# Patient Record
Sex: MALE | Race: WHITE | NOT HISPANIC OR LATINO | Employment: UNEMPLOYED | URBAN - METROPOLITAN AREA
[De-identification: names, ages, dates, MRNs, and addresses within clinical notes are randomized per-mention and may not be internally consistent; named-entity substitution may affect disease eponyms.]

---

## 2017-12-27 ENCOUNTER — APPOINTMENT (OUTPATIENT)
Dept: RADIOLOGY | Facility: MEDICAL CENTER | Age: 32
End: 2017-12-27
Attending: ORTHOPAEDIC SURGERY
Payer: MEDICAID

## 2018-01-15 ENCOUNTER — HOSPITAL ENCOUNTER (OUTPATIENT)
Dept: RADIOLOGY | Facility: MEDICAL CENTER | Age: 33
End: 2018-01-15
Attending: ORTHOPAEDIC SURGERY
Payer: MEDICAID

## 2018-01-15 DIAGNOSIS — S43.015A: ICD-10-CM

## 2018-01-15 PROCEDURE — 700101 HCHG RX REV CODE 250

## 2018-01-15 PROCEDURE — 77002 NEEDLE LOCALIZATION BY XRAY: CPT | Mod: LT

## 2018-01-15 PROCEDURE — 73222 MRI JOINT UPR EXTREM W/DYE: CPT | Mod: LT

## 2018-01-15 PROCEDURE — 700117 HCHG RX CONTRAST REV CODE 255: Performed by: ORTHOPAEDIC SURGERY

## 2018-01-15 PROCEDURE — A9579 GAD-BASE MR CONTRAST NOS,1ML: HCPCS | Performed by: ORTHOPAEDIC SURGERY

## 2018-01-15 RX ORDER — LIDOCAINE HYDROCHLORIDE 10 MG/ML
INJECTION, SOLUTION INFILTRATION; PERINEURAL
Status: DISPENSED
Start: 2018-01-15 | End: 2018-01-16

## 2018-01-15 RX ADMIN — GADODIAMIDE 1 ML: 287 INJECTION INTRAVENOUS at 13:55

## 2018-01-15 RX ADMIN — IOHEXOL 5 ML: 300 INJECTION, SOLUTION INTRAVENOUS at 13:55

## 2018-07-18 PROCEDURE — 99283 EMERGENCY DEPT VISIT LOW MDM: CPT

## 2018-07-18 RX ORDER — FLUOXETINE HYDROCHLORIDE 20 MG/1
30 CAPSULE ORAL DAILY
Status: SHIPPED | COMMUNITY
End: 2020-08-03

## 2018-07-18 ASSESSMENT — PAIN SCALES - GENERAL: PAINLEVEL_OUTOF10: 2

## 2018-07-19 ENCOUNTER — HOSPITAL ENCOUNTER (EMERGENCY)
Facility: MEDICAL CENTER | Age: 33
End: 2018-07-19
Attending: EMERGENCY MEDICINE
Payer: MEDICAID

## 2018-07-19 VITALS
WEIGHT: 191.8 LBS | TEMPERATURE: 97.9 F | BODY MASS INDEX: 27.46 KG/M2 | HEART RATE: 62 BPM | SYSTOLIC BLOOD PRESSURE: 116 MMHG | OXYGEN SATURATION: 97 % | RESPIRATION RATE: 18 BRPM | HEIGHT: 70 IN | DIASTOLIC BLOOD PRESSURE: 78 MMHG

## 2018-07-19 DIAGNOSIS — K05.10 GINGIVITIS: ICD-10-CM

## 2018-07-19 RX ORDER — CLINDAMYCIN HYDROCHLORIDE 150 MG/1
300 CAPSULE ORAL 4 TIMES DAILY
Qty: 56 CAP | Refills: 0 | Status: SHIPPED | OUTPATIENT
Start: 2018-07-20 | End: 2018-07-27

## 2018-07-19 NOTE — ED NOTES
Pt given written and oral discharge instructions. Pt verbalized understanding of all instructions given. All questions answered. Pt given paper prescription and educated on use and side effects. Pt given f/u instructions with verbalization of understanding. Pt educated on s/s of when to return to the ER. Pt ambulating independently upon time of discharge in good condition.

## 2018-07-19 NOTE — ED PROVIDER NOTES
"ED Provider Note    CHIEF COMPLAINT  Chief Complaint   Patient presents with   • Cyst       HPI  Jarvis Perez is a 33 y.o. male who presents with 4 days of intermittent swelling to 1 of his teeth.  He states it was larger today, but it seems to have gone down.  He has not noticed any drainage.  No fevers or vomiting.  He is going on a trip soon and does not want it to get worse while he is away.    REVIEW OF SYSTEMS  See HPI for further details.  No fevers or vomiting    PAST MEDICAL HISTORY  Past Medical History:   Diagnosis Date   • Psychiatric disorder        FAMILY HISTORY  History reviewed. No pertinent family history.    SOCIAL HISTORY  Social History     Social History   • Marital status: Single     Spouse name: N/A   • Number of children: N/A   • Years of education: N/A     Social History Main Topics   • Smoking status: Current Every Day Smoker   • Smokeless tobacco: Never Used   • Alcohol use Yes      Comment: Occasionally   • Drug use: No   • Sexual activity: Not on file     Other Topics Concern   • Not on file     Social History Narrative   • No narrative on file       SURGICAL HISTORY  No past surgical history on file.    CURRENT MEDICATIONS  Home Medications     Reviewed by Osman Olea R.N. (Registered Nurse) on 07/18/18 at 2259  Med List Status: Partial   Medication Last Dose Status   DEPAKOTE 500 MG PO TBEC Not taking Active   ERYTHROMYCIN BASE 500 MG PO TABS Not taking Active   FLUoxetine (PROZAC) 20 MG Cap 7/18/2018 Active   KLONOPIN 0.5 MG PO TABS Not taking Active                ALLERGIES  Allergies   Allergen Reactions   • Other Food      Pecans      • Penicillins Hives       PHYSICAL EXAM  VITAL SIGNS: /78   Pulse 62   Temp 36.6 °C (97.9 °F)   Resp 18   Ht 1.778 m (5' 10\") Comment: Stated  Wt 87 kg (191 lb 12.8 oz)   SpO2 97%   BMI 27.52 kg/m²  @[id number]::@   Constitutional: Well developed, Well nourished, No acute distress, Non-toxic appearance.   HENT: Normocephalic, " Atraumatic, Bilateral external ears normal, mild swelling and erythema above tooth #8 with no discharge or loosening.  Dentition is generally in good condition.  Buccal mucosa is normal.  Submandibular area soft and supple.  Eyes: PERRLA, EOMI, Conjunctiva normal, No discharge.   Neck: Normal range of motion, No tenderness, Supple, No stridor.   Lymphatic: No lymphadenopathy noted.   Skin: Warm, Dry, No erythema, No rash.   Musculoskeletal: Good range of motion in all major joints..       COURSE & MEDICAL DECISION MAKING  Pertinent Labs & Imaging studies reviewed. (See chart for details)  . Patient is given a prescription for clindamycin as well as a list for finding a dentist. They understand that we cannot fix their teeth and they need to find a dentist in order to fix the problem. There is no abscess evident. The patient looks well. They're given verbal and written instructions on how to care for dental pain. They're discharged in satisfactory condition.    FINAL IMPRESSION  1. Gingivitis                 Electronically signed by: Cammie Escobar, 7/19/2018 12:35 AM

## 2018-07-19 NOTE — DISCHARGE INSTRUCTIONS
Gingivitis  Introduction  Gingivitis is redness, soreness, and swelling (inflammation) of the gums. It is caused by germs (plaque) that build up on the teeth and gums. This happens because of poor care and cleaning of the mouth and teeth (oral hygiene). This condition is usually mild and clears up with treatment and proper cleaning of the teeth.  Follow these instructions at home:  · Follow instructions from your dentist about how to clean your teeth. Make sure you:  ¨ Brush your teeth two times per day with a soft-bristled toothbrush.  ¨ Floss at least one time per day. Do this before you brush your teeth.  ¨ Use a mouthwash as told by your dentist.  · Eat a well-balanced diet. Between meals, limit your intake of foods and drinks that have sugar in them.  · See a dentist on a regular basis for cleaning and checkups.  · Do not use tobacco products. These include cigarettes, chewing tobacco, or e-cigarettes. If you need help quitting, ask your doctor.  · Keep all follow-up visits as told by your dentist. This is important.  Contact a doctor if:  · You have a fever.  · You have a lot of bleeding from your gums.  · You have pain in your gums or teeth.  · You have trouble chewing.  · You have any loose or infected teeth.  · You have swollen glands in your face or neck.  This information is not intended to replace advice given to you by your health care provider. Make sure you discuss any questions you have with your health care provider.  Document Released: 01/20/2012 Document Revised: 05/25/2017 Document Reviewed: 08/01/2016  © 2017 [employer]vier

## 2020-08-03 ENCOUNTER — HOSPITAL ENCOUNTER (EMERGENCY)
Facility: MEDICAL CENTER | Age: 35
End: 2020-08-03
Attending: EMERGENCY MEDICINE
Payer: MEDICAID

## 2020-08-03 VITALS
OXYGEN SATURATION: 100 % | BODY MASS INDEX: 28.09 KG/M2 | SYSTOLIC BLOOD PRESSURE: 121 MMHG | HEART RATE: 77 BPM | RESPIRATION RATE: 16 BRPM | WEIGHT: 195.77 LBS | TEMPERATURE: 98.2 F | DIASTOLIC BLOOD PRESSURE: 76 MMHG

## 2020-08-03 DIAGNOSIS — R06.02 SHORTNESS OF BREATH: ICD-10-CM

## 2020-08-03 DIAGNOSIS — J02.9 SORE THROAT: ICD-10-CM

## 2020-08-03 DIAGNOSIS — R52 BODY ACHES: ICD-10-CM

## 2020-08-03 LAB
COVID ORDER STATUS COVID19: NORMAL
SARS-COV-2 RNA RESP QL NAA+PROBE: NOTDETECTED
SPECIMEN SOURCE: NORMAL

## 2020-08-03 PROCEDURE — C9803 HOPD COVID-19 SPEC COLLECT: HCPCS | Performed by: EMERGENCY MEDICINE

## 2020-08-03 PROCEDURE — U0003 INFECTIOUS AGENT DETECTION BY NUCLEIC ACID (DNA OR RNA); SEVERE ACUTE RESPIRATORY SYNDROME CORONAVIRUS 2 (SARS-COV-2) (CORONAVIRUS DISEASE [COVID-19]), AMPLIFIED PROBE TECHNIQUE, MAKING USE OF HIGH THROUGHPUT TECHNOLOGIES AS DESCRIBED BY CMS-2020-01-R: HCPCS

## 2020-08-03 PROCEDURE — 99283 EMERGENCY DEPT VISIT LOW MDM: CPT

## 2020-08-03 RX ORDER — ESCITALOPRAM OXALATE 10 MG/1
10 TABLET ORAL DAILY
Status: SHIPPED | COMMUNITY
End: 2022-10-25

## 2020-08-03 NOTE — ED PROVIDER NOTES
CHIEF COMPLAINT  Chief Complaint   Patient presents with   • Shortness of Breath     symptoms worsening since friday, SoB with exertion.   • Sore Throat   • Tired       HPI  Jarvis Perez is a 35 y.o. male who presents this morning with a chief complaint of sore throat, fatigue, body aches, subjective fever and chills and shortness of breath.  Patient works for a [employer].  He has called off to work since Friday when his symptoms began and he called the nurse hotline and they suggested that he come in to be tested for COVID.  Patient states his workplace would like him tested before he goes back.    REVIEW OF SYSTEMS  See HPI for further details. All other system reviews are negative.    PAST MEDICAL HISTORY  Past Medical History:   Diagnosis Date   • Psychiatric disorder        FAMILY HISTORY  History reviewed. No pertinent family history.    SOCIAL HISTORY  Social History     Socioeconomic History   • Marital status: Single     Spouse name: Not on file   • Number of children: Not on file   • Years of education: Not on file   • Highest education level: Not on file   Occupational History   • Not on file   Social Needs   • Financial resource strain: Not on file   • Food insecurity     Worry: Not on file     Inability: Not on file   • Transportation needs     Medical: Not on file     Non-medical: Not on file   Tobacco Use   • Smoking status: Current Every Day Smoker     Packs/day: 1.00     Types: Cigarettes   • Smokeless tobacco: Never Used   Substance and Sexual Activity   • Alcohol use: Not Currently   • Drug use: No   • Sexual activity: Not on file   Lifestyle   • Physical activity     Days per week: Not on file     Minutes per session: Not on file   • Stress: Not on file   Relationships   • Social connections     Talks on phone: Not on file     Gets together: Not on file     Attends [employer] service: Not on file     Active member of club or organization: Not on file     Attends meetings of clubs or  organizations: Not on file     Relationship status: Not on file   • Intimate partner violence     Fear of current or ex partner: Not on file     Emotionally abused: Not on file     Physically abused: Not on file     Forced sexual activity: Not on file   Other Topics Concern   • Not on file   Social History Narrative   • Not on file       SURGICAL HISTORY  History reviewed. No pertinent surgical history.    CURRENT MEDICATIONS  none    ALLERGIES  Allergies   Allergen Reactions   • Other Food      Pecans      • Penicillins Hives       PHYSICAL EXAM  VITAL SIGNS: /76   Pulse 77   Temp 36.8 °C (98.2 °F) (Oral)   Resp 16   Wt 88.8 kg (195 lb 12.3 oz)   SpO2 100%   BMI 28.09 kg/m²     Constitutional: Patient is well developed, well nourished in no acute distress.   HENT: Normocephalic. Oropharynx moist without erythema or exudates, nose normal with no mucosal edema or drainage.   Eyes: PERRL, EOMI, Conjunctiva without erythema or exudates.   Neck: Supple with  Normal range of motion in flexion, extension and lateral rotation. No tenderness.  Lymphatic: No lymphadenopathy noted.   Cardiovascular: Normal heart rate and rhythm. No murmur  Thorax & Lungs: Clear and equal breath sounds with good excursion. No respiratory distress, no rhonchi or wheezing.  Abdomen: Bowel sounds normal in all four quadrants. Soft,nontender.   Skin: Warm, Dry, No rashes.  Extremities: Peripheral pulses 4/4  No tenderness  Musculoskeletal: Normal range of motion in all major joints.  Neurologic: Alert & oriented x 3, Normal motor function, Normal sensory function  Psychiatric: Affect normal, Judgment normal, Mood normal.    COURSE & MEDICAL DECISION MAKING  Pertinent Labs & Imaging studies reviewed. (See chart for details)  Patient was tested for COVID.  He will await the results from the [facility] and quarantine appropriately for 2 weeks if positive and if not he will go back to work without restrictions.  He was  given a work note for 2 days.    FINAL IMPRESSION  1.  Sore throat  2.  Body aches  3.  Shortness of breath  4.  Possible COVID versus viral illness         Electronically signed by: Karolina Raza D.O., 8/3/2020 5:59 [facility] Provider Note

## 2020-08-03 NOTE — Clinical Note
Jarvis Perez was seen and treated in our emergency department on 8/3/2020.  He may return to work on 08/05/2020.  Jarvis has been tested for Covid and will need to await the results from the health department.  If this test comes back positive he will need to quarantine for 2 weeks.  If it comes back negative he can return to work without restrictions.     If you have any questions or concerns, please don't hesitate to call.      Karolina Raza D.O.

## 2020-08-03 NOTE — ED TRIAGE NOTES
Chief Complaint   Patient presents with   • Shortness of Breath     symptoms worsening since friday, SoB with exertion.   • Sore Throat   • Tired     ED Triage Vitals [08/03/20 0538]   Enc Vitals Group      Blood Pressure 121/76      Pulse 77      Respiration 16      Temperature 36.8 °C (98.2 °F)      Temp src Oral      Pulse Oximetry 100 %      Weight 88.8 kg (195 lb 12.3 oz)

## 2020-08-03 NOTE — DISCHARGE INSTRUCTIONS
You have been tested for COVID, the results will be called to you by the [facility].  If it is positive you will need to quarantine for 2 weeks and then retest until it is negative before you can return to work.  If it is negative consider yourself to just have a viral illness, rest, increase fluids, immune boosters i.e. airborne, vitamin C etc.  Tylenol or ibuprofen for body aches or temperature and follow-up with your primary care doctor within the week for recheck.

## 2020-08-05 NOTE — ED NOTES
COVID-19 Test Follow Up  08/05/20      Results for BRIAN SAWANT (MRN [id number]) as of 8/5/2020 10:54   Ref. Range 8/3/2020 05:55   SARS-CoV-2 by PCR Unknown NotDetected       Patient tested negative for COVID-19. I have informed the patient of the result by My Chart. Encouraged to stay at home until no fever for 72 hours without the use of fever reducing medications and symptoms improving. Informed there is no need to further self-isolate for 14 days for COVID-19 unless otherwise directed by the Health Dept.     Linda Duque, PharmD

## 2020-11-08 ENCOUNTER — HOSPITAL ENCOUNTER (EMERGENCY)
Facility: MEDICAL CENTER | Age: 35
End: 2020-11-08
Attending: EMERGENCY MEDICINE
Payer: MEDICAID

## 2020-11-08 VITALS
RESPIRATION RATE: 16 BRPM | SYSTOLIC BLOOD PRESSURE: 115 MMHG | WEIGHT: 177.69 LBS | TEMPERATURE: 99.5 F | HEART RATE: 70 BPM | DIASTOLIC BLOOD PRESSURE: 69 MMHG | BODY MASS INDEX: 26.32 KG/M2 | OXYGEN SATURATION: 97 % | HEIGHT: 69 IN

## 2020-11-08 DIAGNOSIS — Z20.822 SUSPECTED COVID-19 VIRUS INFECTION: ICD-10-CM

## 2020-11-08 LAB — COVID ORDER STATUS COVID19: NORMAL

## 2020-11-08 PROCEDURE — C9803 HOPD COVID-19 SPEC COLLECT: HCPCS | Performed by: EMERGENCY MEDICINE

## 2020-11-08 PROCEDURE — 99284 EMERGENCY DEPT VISIT MOD MDM: CPT

## 2020-11-08 PROCEDURE — U0003 INFECTIOUS AGENT DETECTION BY NUCLEIC ACID (DNA OR RNA); SEVERE ACUTE RESPIRATORY SYNDROME CORONAVIRUS 2 (SARS-COV-2) (CORONAVIRUS DISEASE [COVID-19]), AMPLIFIED PROBE TECHNIQUE, MAKING USE OF HIGH THROUGHPUT TECHNOLOGIES AS DESCRIBED BY CMS-2020-01-R: HCPCS

## 2020-11-09 LAB
SARS-COV-2 RNA RESP QL NAA+PROBE: DETECTED
SPECIMEN SOURCE: ABNORMAL

## 2020-11-09 NOTE — ED NOTES
COVID-19 Test Follow-Up  11/09/20    Patient is positive for COVID-19.      SARS-CoV-2 Source   Date Value Ref Range Status   11/08/2020 NP Swab  Final     SARS-CoV-2 by PCR   Date Value Ref Range Status   11/08/2020 DETECTED (AA)  Final     Comment:     **The TaqPath COVID-19 SARS-CoV-2 test has been made available for use under  the Emergency Use Authorization (EUA) only.       Attempted to contact the patient by phone but there was no answer. I have informed the patient of the positive result by My Chart message and that the Health Dept would be in contact soon. Instructed them to continue to quarantine and self-isolate according with the CDC guidance or as otherwise directed by the Health Dept.    Per the CDC, they should continue to self-isolate until:   • At least 10 days since symptoms first appeared and  • At least 24 hours with no fever without fever-reducing medication and  • Other symptoms of COVID-19 are improving (Loss of taste and smell may persist for weeks or months after recovery and need not delay the end of isolation)    They are advised to return to the ER for worsening symptoms including difficulty breathing, ongoing fever, weakness or chest pain.    Belia Dunn, PharmD     ADDENDUM: 11/11/2020  Spoke with the patient, answered questions and gave him the above information.    Belia Dunn, PharmD

## 2020-11-09 NOTE — ED NOTES
"Pt states \" I am concerned I have Covid \" Pt unsure of exposure. Pt states he had labored breathing at home, upon assessment pt resting in bed watching TV, non labored breathing noted   "

## 2020-11-09 NOTE — ED PROVIDER NOTES
"ED Provider Note    CHIEF COMPLAINT  Chief Complaint   Patient presents with   • Fever     Onset friday No documented [employer] Denies known COVID exposure   • Cough   • Headache   • Shortness of Breath       HPI  Jarivs Perez is a 35 y.o. male who presents with cough, headache, shortness of breath, decreased sensation in taste and smell, sore throat, body aches.  He states that he was around another person on Tuesday with similar symptoms.  The patient notes that his symptoms have been present for only 2 days now.  His roommate also has similar symptoms.  No chest pain.  No swelling.  No rash.  No vomiting or diarrhea.  No abdominal pain.  Denies prior pulmonary history though does smoke cigarettes.    I verified that the patient was wearing a mask and I was wearing appropriate PPE every time I entered the room. The patient's mask was on the patient at all times during my encounter except for a brief view of the oropharynx.      REVIEW OF SYSTEMS  See HPI for further details. All other systems are negative.     PAST MEDICAL HISTORY   has a past medical history of Psychiatric disorder.    SOCIAL HISTORY  Social History     Tobacco Use   • Smoking status: Current Every Day Smoker     Packs/day: 1.00     Types: Cigarettes   • Smokeless tobacco: Never Used   Substance and Sexual Activity   • Alcohol use: Not Currently   • Drug use: No   • Sexual activity: Not on file       SURGICAL HISTORY  patient denies any surgical history    CURRENT MEDICATIONS  Home Medications    **Home medications have not yet been reviewed for this encounter**         ALLERGIES  Allergies   Allergen Reactions   • Other Food      Pecans      • Penicillins Hives       PHYSICAL EXAM  VITAL SIGNS: /72   Pulse 93   Temp 36.8 °C (98.3 °F) (Temporal)   Resp 16   Ht 1.753 m (5' 9\")   Wt 80.6 kg (177 lb 11.1 oz)   SpO2 96%   BMI 26.24 kg/m²   Pulse ox interpretation: I interpret this pulse ox as normal.  Constitutional: Alert in no " apparent distress.  HENT: No signs of trauma, Bilateral external ears normal, Nose normal.  Posterior pharynx is unremarkable.  Eyes: Pupils are equal and reactive, Conjunctiva normal, Non-icteric.   Neck: Normal range of motion, No tenderness, Supple, No stridor.   Cardiovascular: Regular rate and rhythm.   Thorax & Lungs: Normal breath sounds, No respiratory distress, No wheezing, No chest tenderness.   Abdomen: Bowel sounds normal, Soft, No tenderness, No masses, No pulsatile masses. No peritoneal signs.  Skin: Warm, Dry, No erythema, No rash.   Back: No bony tenderness, No CVA tenderness.   Extremities: Intact distal pulses, No edema, No tenderness, No cyanosis  Neurologic: Alert, No focal deficits noted.       DIAGNOSTIC STUDIES / PROCEDURES      LABS  Labs Reviewed   COVID/SARS COV-2    Narrative:     Droplet, Contact, and Eye [id number] PRADIP PIÑA.  Is patient being admitted?->No  Expected turn around time?->Routine (In-House PCR up to 24  hours)  Is this the patients First SARS CoV-2 test?->No  Is this patient employed in healthcare?->No  Is the patient symptomatic as defined by the CDC?->Yes  Date of symptom onset?->11/6/20  Is the patient hospitalized?->No  Is the patient a resident in a congregate care setting?->No  Is the patient pregnant?->No   SARS-COV-2, PCR (IN-HOUSE)    Narrative:     Droplet, Contact, and Eye [id number] PRADIP PIÑA.  Is patient being admitted?->No  Expected turn around time?->Routine (In-House PCR up to 24  hours)  Is this the patients First SARS CoV-2 test?->No  Is this patient employed in healthcare?->No  Is the patient symptomatic as defined by the CDC?->Yes  Date of symptom onset?->11/6/20  Is the patient hospitalized?->No  Is the patient a resident in a congregate care setting?->No  Is the patient pregnant?->No         COURSE & MEDICAL DECISION MAKING    Medications - No data to display     Pertinent Labs & Imaging studies reviewed. (See chart for  "details)  35 y.o. male with symptoms suggestive of Covid.  Has multiple sick contacts with similar symptoms though no confirmed Covid diagnosis.  Patient is requesting Covid testing.  He tried to call around to get an outpatient test however he could not find any available testing facility.  Covid testing was performed here.  No respiratory distress.  Clear breath sounds bilaterally.  Normal vital signs.  No hypoxia.  Patient appears stable for outpatient follow-up.    The patient was instructed to follow-up with primary care physician for further management.  To return immediately for any worsening symptoms or development of any other concerning signs or symptoms. The patient verbalizes understanding in their own words.    /69   Pulse 70   Temp 37.5 °C (99.5 °F) (Temporal)   Resp 16   Ht 1.753 m (5' 9\")   Wt 80.6 kg (177 lb 11.1 oz)   SpO2 97%   BMI 26.24 kg/m²     The patient was referred to primary care where they will receive further BP management.      [facility], Emergency Dept  [address]  [facility] [id number] [phone]    As needed, If symptoms worsen    Primary care doctor    Schedule an appointment as soon as possible for a visit         FINAL IMPRESSION  1. Suspected COVID-19 virus infection            Electronically signed by: Jayden Ruffin M.D., 11/8/2020 6:14 PM    "

## 2021-05-26 ENCOUNTER — APPOINTMENT (OUTPATIENT)
Dept: RADIOLOGY | Facility: MEDICAL CENTER | Age: 36
End: 2021-05-26
Attending: EMERGENCY MEDICINE
Payer: MEDICAID

## 2021-05-26 ENCOUNTER — HOSPITAL ENCOUNTER (EMERGENCY)
Facility: MEDICAL CENTER | Age: 36
End: 2021-05-26
Attending: EMERGENCY MEDICINE
Payer: MEDICAID

## 2021-05-26 ENCOUNTER — PATIENT OUTREACH (OUTPATIENT)
Dept: HEALTH INFORMATION MANAGEMENT | Facility: OTHER | Age: 36
End: 2021-05-26

## 2021-05-26 VITALS
SYSTOLIC BLOOD PRESSURE: 103 MMHG | TEMPERATURE: 97.2 F | HEART RATE: 58 BPM | RESPIRATION RATE: 18 BRPM | BODY MASS INDEX: 28.11 KG/M2 | DIASTOLIC BLOOD PRESSURE: 76 MMHG | HEIGHT: 69 IN | OXYGEN SATURATION: 96 % | WEIGHT: 189.82 LBS

## 2021-05-26 DIAGNOSIS — R07.9 CHEST PAIN, UNSPECIFIED TYPE: ICD-10-CM

## 2021-05-26 LAB
ALBUMIN SERPL BCP-MCNC: 4 G/DL (ref 3.2–4.9)
ALBUMIN/GLOB SERPL: 2 G/DL
ALP SERPL-CCNC: 80 U/L (ref 30–99)
ALT SERPL-CCNC: 19 U/L (ref 2–50)
ANION GAP SERPL CALC-SCNC: 11 MMOL/L (ref 7–16)
AST SERPL-CCNC: 19 U/L (ref 12–45)
BASOPHILS # BLD AUTO: 0.5 % (ref 0–1.8)
BASOPHILS # BLD: 0.05 K/UL (ref 0–0.12)
BILIRUB SERPL-MCNC: 0.5 MG/DL (ref 0.1–1.5)
BUN SERPL-MCNC: 17 MG/DL (ref 8–22)
CALCIUM SERPL-MCNC: 8.8 MG/DL (ref 8.4–10.2)
CHLORIDE SERPL-SCNC: 107 MMOL/L (ref 96–112)
CO2 SERPL-SCNC: 23 MMOL/L (ref 20–33)
CREAT SERPL-MCNC: 0.85 MG/DL (ref 0.5–1.4)
EKG IMPRESSION: NORMAL
EOSINOPHIL # BLD AUTO: 0.13 K/UL (ref 0–0.51)
EOSINOPHIL NFR BLD: 1.2 % (ref 0–6.9)
ERYTHROCYTE [DISTWIDTH] IN BLOOD BY AUTOMATED COUNT: 41.9 FL (ref 35.9–50)
GLOBULIN SER CALC-MCNC: 2 G/DL (ref 1.9–3.5)
GLUCOSE SERPL-MCNC: 92 MG/DL (ref 65–99)
HCT VFR BLD AUTO: 46.6 % (ref 42–52)
HGB BLD-MCNC: 15.6 G/DL (ref 14–18)
IMM GRANULOCYTES # BLD AUTO: 0.06 K/UL (ref 0–0.11)
IMM GRANULOCYTES NFR BLD AUTO: 0.6 % (ref 0–0.9)
LIPASE SERPL-CCNC: 35 U/L (ref 7–58)
LYMPHOCYTES # BLD AUTO: 2.28 K/UL (ref 1–4.8)
LYMPHOCYTES NFR BLD: 21.9 % (ref 22–41)
MCH RBC QN AUTO: 30.3 PG (ref 27–33)
MCHC RBC AUTO-ENTMCNC: 33.5 G/DL (ref 33.7–35.3)
MCV RBC AUTO: 90.5 FL (ref 81.4–97.8)
MONOCYTES # BLD AUTO: 0.57 K/UL (ref 0–0.85)
MONOCYTES NFR BLD AUTO: 5.5 % (ref 0–13.4)
NEUTROPHILS # BLD AUTO: 7.34 K/UL (ref 1.82–7.42)
NEUTROPHILS NFR BLD: 70.3 % (ref 44–72)
NRBC # BLD AUTO: 0 K/UL
NRBC BLD-RTO: 0 /100 WBC
NT-PROBNP SERPL IA-MCNC: 30 PG/ML (ref 0–125)
PLATELET # BLD AUTO: 178 K/UL (ref 164–446)
PMV BLD AUTO: 11.1 FL (ref 9–12.9)
POTASSIUM SERPL-SCNC: 4.3 MMOL/L (ref 3.6–5.5)
PROT SERPL-MCNC: 6 G/DL (ref 6–8.2)
RBC # BLD AUTO: 5.15 M/UL (ref 4.7–6.1)
SODIUM SERPL-SCNC: 141 MMOL/L (ref 135–145)
TROPONIN T SERPL-MCNC: <6 NG/L (ref 6–19)
WBC # BLD AUTO: 10.4 K/UL (ref 4.8–10.8)

## 2021-05-26 PROCEDURE — 80053 COMPREHEN METABOLIC PANEL: CPT

## 2021-05-26 PROCEDURE — 93005 ELECTROCARDIOGRAM TRACING: CPT | Performed by: EMERGENCY MEDICINE

## 2021-05-26 PROCEDURE — 84484 ASSAY OF TROPONIN QUANT: CPT

## 2021-05-26 PROCEDURE — 83690 ASSAY OF LIPASE: CPT

## 2021-05-26 PROCEDURE — 71045 X-RAY EXAM CHEST 1 VIEW: CPT

## 2021-05-26 PROCEDURE — 36415 COLL VENOUS BLD VENIPUNCTURE: CPT

## 2021-05-26 PROCEDURE — 85025 COMPLETE CBC W/AUTO DIFF WBC: CPT

## 2021-05-26 PROCEDURE — 99283 EMERGENCY DEPT VISIT LOW MDM: CPT

## 2021-05-26 PROCEDURE — 83880 ASSAY OF NATRIURETIC PEPTIDE: CPT

## 2021-05-26 PROCEDURE — 93005 ELECTROCARDIOGRAM TRACING: CPT

## 2021-05-26 NOTE — ED TRIAGE NOTES
"Chief Complaint   Patient presents with   • Chest Pain     woke up w/ \"sharp\" L side chest pain, denies any radiation of pain, pain is intermittant now.    • Shortness of Breath     w/ CP, mild     /74   Pulse 71   Temp 36.2 °C (97.2 °F) (Temporal)   Resp 18   Ht 1.753 m (5' 9\")   SpO2 98%   BMI 26.24 kg/m²     Pt BIB family for above concern, EKG done in triage  "

## 2021-05-26 NOTE — LETTER
Jarvis Perez was seen and treated in our emergency department on 5/26/2021.  He may return to work on 05/28/2021      If you have any questions or concerns, please don't hesitate to call.      Dr Lex Worthington

## 2021-05-27 NOTE — ED NOTES
Discharge home with instructions and follow up care reviewed and given to patient with verbal understanding    Ambulatory with a steady gait and stable condition

## 2021-05-27 NOTE — PROGRESS NOTES
CHW called patient but there was no answer. CHW called patient's nurse to see if nurse was able to connect CHW to patient. Patient was just discharged as CHW called nurse. CHW will call patient tomorrow.

## 2021-05-27 NOTE — ED PROVIDER NOTES
"ED Provider Note    ED Provider Note    Primary care provider: Carlos Enrique Reyes M.D. (Inactive)  Means of arrival: Walk-in  History obtained from: Patient    CHIEF COMPLAINT  Chief Complaint   Patient presents with   • Chest Pain     woke up w/ \"sharp\" L side chest pain, denies any radiation of pain, pain is intermittant now.    • Shortness of Breath     w/ CP, mild     Seen at 5:08 PM.   HPI  Jarvis Perez is a 36 y.o. male who presents to the Emergency Department with intermittent sharp stabbing left-sided chest pain, initially worse with deep inspiration.  The pain was moderate to severe when the patient woke up from sleep at 3 PM today.  Since this time it has eased off and he notes some intermittent brief twinges in the left side of his chest.  He did have some associated shortness of breath, none currently.  He is not having lightheadedness, nausea or vomiting.  He reports having a few months of intermittent stabbing left-sided chest pain, sometimes when he wakes up he feels that the blood rushes from his heart and he has to take a few deep breaths.  He does sleep with multiple pillows but it does not sound as if he has to prop himself up from sleep, does not appear consistent with orthopnea.    He denies any fevers or chills.  He does have a chronic cough from smoking.  He denies any numbness or focal weakness.  He has on an SSRI but no other medications.  No prior history of DVT or pulmonary embolus.    REVIEW OF SYSTEMS  See HPI,   Remainder of ROS negative.     PAST MEDICAL HISTORY   has a past medical history of Psychiatric disorder.    SURGICAL HISTORY  patient denies any surgical history    SOCIAL HISTORY  Social History     Tobacco Use   • Smoking status: Current Every Day Smoker     Packs/day: 1.00     Types: Cigarettes   • Smokeless tobacco: Never Used   Vaping Use   • Vaping Use: Former   • Substances: Nicotine   Substance Use Topics   • Alcohol use: Not Currently   • Drug use: No      Social " "History     Substance and Sexual Activity   Drug Use No       FAMILY HISTORY  History reviewed. No pertinent family history.    CURRENT MEDICATIONS  Reviewed.  See Encounter Summary.     ALLERGIES  Allergies   Allergen Reactions   • Other Food      Pecans      • Penicillins Hives       PHYSICAL EXAM  VITAL SIGNS: /76   Pulse (!) 58   Temp 36.2 °C (97.2 °F) (Temporal)   Resp 18   Ht 1.753 m (5' 9\")   Wt 86.1 kg (189 lb 13.1 oz)   SpO2 96%   BMI 28.03 kg/m²   Constitutional: Awake, alert in no apparent distress.  HENT: Normocephalic, Bilateral external ears normal. Nose normal.   Eyes: Conjunctiva normal, non-icteric, EOMI.    Thorax & Lungs: Easy unlabored respirations, Clear to ascultation bilaterally.  Chest wall normal in appearance.  Cardiovascular: Regular rate, Regular rhythm, No murmurs, rubs or gallops. Bilateral pulses symmetrical.   Abdomen:  Soft, nontender, nondistended, normal active bowel sounds.   :    Skin: Visualized skin is  Dry, No erythema, No rash.   Musculoskeletal:   No cyanosis, clubbing or edema. No leg asymmetry.   Neurologic: Alert, Grossly non-focal.   Psychiatric: Normal affect, Normal mood  Lymphatic:  No cervical LAD    EKG   12 lead Interpreted by me  Rhythm:  Normal sinus rhythm   Rate: 62  Axis: normal  Ectopy: none  Conduction: normal  ST Segments: no acute change  T Waves: no acute change  Clinical Impression: Normal EKG without acute changes     RADIOLOGY  DX-CHEST-PORTABLE (1 VIEW)   Final Result      No acute cardiac or pulmonary abnormality is noted.            COURSE & MEDICAL DECISION MAKING  Pertinent Labs & Imaging studies reviewed. (See chart for details)    Differential diagnoses include but are not limited to: Atypical chest pain, anxiety, less likely PND, ACS, Prinzmetal's angina, patient is PERC negative for pulmonary embolus.  Not consistent with dissection.  Unlikely to be CAP, pneumothorax.    5:08 PM - Medical record reviewed, patient seen and " examined at bedside.    Decision Making:  This is a pleasant 36 y.o. year old male who presents with chest pain.  The patient has had waxing waning chest pain for the past several months, seems quite atypical in nature.  He does not have any exertional pain or dyspnea on exertion.  Heart score is quite low at this time.  The patient's only risk factor is smoking history.  He does have some underlying depression, possibly untreated anxiety as well.  Other causes were explored.  The patient is PERC negative for pulmonary embolus.  Do not suspect dissection.  Chest x-ray negative for pneumothorax or infiltrate.  He is chest pain-free at this time.  Plan to discharge patient and arrange outpatient cardiology follow-up.    Discharge Medications:  Discharge Medication List as of 5/26/2021  6:26 PM          The patient was discharged home (see d/c instructions) was told to return immediately for any signs or symptoms listed, or any worsening at all.  The patient verbally agreed to the discharge precautions and follow-up plan which is documented in EPIC.        FINAL IMPRESSION  1. Chest pain, unspecified type

## 2021-05-28 NOTE — PROGRESS NOTES
CHW called patient a second time to schedule patient for cards appointment. Patient did not answer CHW's call. CHW left a VM for patient with CHW's contact information and cardiology information. CHW will call patient a third time to schedule PCP appointment.

## 2021-06-03 NOTE — PROGRESS NOTES
CHW called patient a third time to schedule PCP appointment. Patient did not answer CHW's call. CHW left a VM for patient with CHW's contact information and clinic information. CHW will discharge patient from list due to inability to contact.

## 2022-08-03 ENCOUNTER — OFFICE VISIT (OUTPATIENT)
Dept: INTERNAL MEDICINE | Facility: OTHER | Age: 37
End: 2022-08-03
Payer: MEDICAID

## 2022-08-03 VITALS
TEMPERATURE: 98.4 F | HEART RATE: 65 BPM | HEIGHT: 69 IN | OXYGEN SATURATION: 96 % | DIASTOLIC BLOOD PRESSURE: 69 MMHG | SYSTOLIC BLOOD PRESSURE: 103 MMHG | WEIGHT: 169.4 LBS | BODY MASS INDEX: 25.09 KG/M2

## 2022-08-03 DIAGNOSIS — F32.5 MAJOR DEPRESSIVE DISORDER IN FULL REMISSION, UNSPECIFIED WHETHER RECURRENT (HCC): ICD-10-CM

## 2022-08-03 DIAGNOSIS — R39.14 FEELING OF INCOMPLETE BLADDER EMPTYING: ICD-10-CM

## 2022-08-03 DIAGNOSIS — F42.9 OBSESSIVE-COMPULSIVE DISORDER, UNSPECIFIED TYPE: ICD-10-CM

## 2022-08-03 DIAGNOSIS — Z72.0 TOBACCO ABUSE: ICD-10-CM

## 2022-08-03 DIAGNOSIS — G47.39 SLEEP APNEA-LIKE BEHAVIOR: ICD-10-CM

## 2022-08-03 DIAGNOSIS — F41.1 GAD (GENERALIZED ANXIETY DISORDER): ICD-10-CM

## 2022-08-03 DIAGNOSIS — Z00.00 ROUTINE ADULT HEALTH MAINTENANCE: ICD-10-CM

## 2022-08-03 PROBLEM — R00.2 PALPITATIONS: Status: ACTIVE | Noted: 2019-12-17

## 2022-08-03 PROBLEM — F32.A DEPRESSION: Status: ACTIVE | Noted: 2018-12-13

## 2022-08-03 PROBLEM — B00.9 HERPESVIRUS INFECTION: Status: ACTIVE | Noted: 2018-12-13

## 2022-08-03 PROBLEM — F52.32 MALE ORGASMIC DISORDER: Status: ACTIVE | Noted: 2019-12-17

## 2022-08-03 PROBLEM — L72.0 EPIDERMOID CYST OF SKIN OF SCALP: Status: ACTIVE | Noted: 2017-06-22

## 2022-08-03 PROBLEM — F32.9 MDD (MAJOR DEPRESSIVE DISORDER): Status: ACTIVE | Noted: 2022-08-03

## 2022-08-03 PROBLEM — B35.6 TINEA CRURIS: Status: ACTIVE | Noted: 2017-02-27

## 2022-08-03 PROCEDURE — 99204 OFFICE O/P NEW MOD 45 MIN: CPT | Mod: GC

## 2022-08-03 RX ORDER — NICOTINE 21 MG/24HR
1 PATCH, TRANSDERMAL 24 HOURS TRANSDERMAL EVERY 24 HOURS
Qty: 28 PATCH | Refills: 6 | Status: SHIPPED | OUTPATIENT
Start: 2022-08-03 | End: 2022-09-23 | Stop reason: SDUPTHER

## 2022-08-03 RX ORDER — ESCITALOPRAM OXALATE 20 MG/1
10-20 TABLET ORAL
COMMUNITY
Start: 2022-06-09 | End: 2023-03-15 | Stop reason: SDUPTHER

## 2022-08-03 SDOH — ECONOMIC STABILITY: TRANSPORTATION INSECURITY
IN THE PAST 12 MONTHS, HAS LACK OF TRANSPORTATION KEPT YOU FROM MEETINGS, WORK, OR FROM GETTING THINGS NEEDED FOR DAILY LIVING?: NO

## 2022-08-03 SDOH — HEALTH STABILITY: PHYSICAL HEALTH: ON AVERAGE, HOW MANY MINUTES DO YOU ENGAGE IN EXERCISE AT THIS LEVEL?: 60 MIN

## 2022-08-03 SDOH — ECONOMIC STABILITY: FOOD INSECURITY: WITHIN THE PAST 12 MONTHS, THE FOOD YOU BOUGHT JUST DIDN'T LAST AND YOU DIDN'T HAVE MONEY TO GET MORE.: NEVER TRUE

## 2022-08-03 SDOH — HEALTH STABILITY: MENTAL HEALTH
STRESS IS WHEN SOMEONE FEELS TENSE, NERVOUS, ANXIOUS, OR CAN'T SLEEP AT NIGHT BECAUSE THEIR MIND IS TROUBLED. HOW STRESSED ARE YOU?: TO SOME EXTENT

## 2022-08-03 SDOH — HEALTH STABILITY: PHYSICAL HEALTH: ON AVERAGE, HOW MANY DAYS PER WEEK DO YOU ENGAGE IN MODERATE TO STRENUOUS EXERCISE (LIKE A BRISK WALK)?: 4 DAYS

## 2022-08-03 SDOH — ECONOMIC STABILITY: INCOME INSECURITY: HOW HARD IS IT FOR YOU TO PAY FOR THE VERY BASICS LIKE FOOD, HOUSING, MEDICAL CARE, AND HEATING?: NOT VERY HARD

## 2022-08-03 SDOH — ECONOMIC STABILITY: HOUSING INSECURITY
IN THE LAST 12 MONTHS, WAS THERE A TIME WHEN YOU DID NOT HAVE A STEADY PLACE TO SLEEP OR SLEPT IN A SHELTER (INCLUDING NOW)?: NO

## 2022-08-03 SDOH — ECONOMIC STABILITY: FOOD INSECURITY: WITHIN THE PAST 12 MONTHS, YOU WORRIED THAT YOUR FOOD WOULD RUN OUT BEFORE YOU GOT MONEY TO BUY MORE.: NEVER TRUE

## 2022-08-03 SDOH — ECONOMIC STABILITY: HOUSING INSECURITY

## 2022-08-03 SDOH — ECONOMIC STABILITY: HOUSING INSECURITY: IN THE LAST 12 MONTHS, HOW MANY PLACES HAVE YOU LIVED?: 2

## 2022-08-03 SDOH — ECONOMIC STABILITY: TRANSPORTATION INSECURITY
IN THE PAST 12 MONTHS, HAS LACK OF RELIABLE TRANSPORTATION KEPT YOU FROM MEDICAL APPOINTMENTS, MEETINGS, WORK OR FROM GETTING THINGS NEEDED FOR DAILY LIVING?: NO

## 2022-08-03 SDOH — ECONOMIC STABILITY: TRANSPORTATION INSECURITY
IN THE PAST 12 MONTHS, HAS THE LACK OF TRANSPORTATION KEPT YOU FROM MEDICAL APPOINTMENTS OR FROM GETTING MEDICATIONS?: NO

## 2022-08-03 ASSESSMENT — FIBROSIS 4 INDEX: FIB4 SCORE: 0.86

## 2022-08-03 ASSESSMENT — LIFESTYLE VARIABLES
HOW MANY STANDARD DRINKS CONTAINING ALCOHOL DO YOU HAVE ON A TYPICAL DAY: 1 OR 2
HOW OFTEN DO YOU HAVE A DRINK CONTAINING ALCOHOL: MONTHLY OR LESS
HOW OFTEN DO YOU HAVE SIX OR MORE DRINKS ON ONE OCCASION: NEVER
SKIP TO QUESTIONS 9-10: 1
AUDIT-C TOTAL SCORE: 1

## 2022-08-03 ASSESSMENT — SOCIAL DETERMINANTS OF HEALTH (SDOH)
HOW OFTEN DO YOU ATTEND CHURCH OR RELIGIOUS SERVICES?: NEVER
IN A TYPICAL WEEK, HOW MANY TIMES DO YOU TALK ON THE PHONE WITH FAMILY, FRIENDS, OR NEIGHBORS?: MORE THAN THREE TIMES A WEEK
ARE YOU MARRIED, WIDOWED, DIVORCED, SEPARATED, NEVER MARRIED, OR LIVING WITH A PARTNER?: NEVER MARRIED
HOW OFTEN DO YOU GET TOGETHER WITH FRIENDS OR RELATIVES?: MORE THAN THREE TIMES A WEEK
WITHIN THE PAST 12 MONTHS, YOU WORRIED THAT YOUR FOOD WOULD RUN OUT BEFORE YOU GOT THE MONEY TO BUY MORE: NEVER TRUE
HOW OFTEN DO YOU ATTEND CHURCH OR RELIGIOUS SERVICES?: NEVER
HOW OFTEN DO YOU ATTENT MEETINGS OF THE CLUB OR ORGANIZATION YOU BELONG TO?: NEVER
HOW OFTEN DO YOU HAVE A DRINK CONTAINING ALCOHOL: MONTHLY OR LESS
HOW MANY DRINKS CONTAINING ALCOHOL DO YOU HAVE ON A TYPICAL DAY WHEN YOU ARE DRINKING: 1 OR 2
HOW OFTEN DO YOU GET TOGETHER WITH FRIENDS OR RELATIVES?: MORE THAN THREE TIMES A WEEK
DO YOU BELONG TO ANY CLUBS OR ORGANIZATIONS SUCH AS CHURCH GROUPS UNIONS, FRATERNAL OR ATHLETIC GROUPS, OR SCHOOL GROUPS?: NO
HOW OFTEN DO YOU HAVE SIX OR MORE DRINKS ON ONE OCCASION: NEVER
IN A TYPICAL WEEK, HOW MANY TIMES DO YOU TALK ON THE PHONE WITH FAMILY, FRIENDS, OR NEIGHBORS?: MORE THAN THREE TIMES A WEEK
HOW OFTEN DO YOU ATTENT MEETINGS OF THE CLUB OR ORGANIZATION YOU BELONG TO?: NEVER
ARE YOU MARRIED, WIDOWED, DIVORCED, SEPARATED, NEVER MARRIED, OR LIVING WITH A PARTNER?: NEVER MARRIED
HOW HARD IS IT FOR YOU TO PAY FOR THE VERY BASICS LIKE FOOD, HOUSING, MEDICAL CARE, AND HEATING?: NOT VERY HARD
DO YOU BELONG TO ANY CLUBS OR ORGANIZATIONS SUCH AS CHURCH GROUPS UNIONS, FRATERNAL OR ATHLETIC GROUPS, OR SCHOOL GROUPS?: NO

## 2022-08-03 ASSESSMENT — ENCOUNTER SYMPTOMS
MUSCULOSKELETAL NEGATIVE: 1
DOUBLE VISION: 0
GASTROINTESTINAL NEGATIVE: 1
BLURRED VISION: 0
SORE THROAT: 0
NAUSEA: 0
NEUROLOGICAL NEGATIVE: 1
CARDIOVASCULAR NEGATIVE: 1
ABDOMINAL PAIN: 0
EYES NEGATIVE: 1
PALPITATIONS: 0
RESPIRATORY NEGATIVE: 1
CONSTITUTIONAL NEGATIVE: 1
COUGH: 0
WHEEZING: 0
FEVER: 0
SHORTNESS OF BREATH: 0
CHILLS: 0

## 2022-08-03 ASSESSMENT — PATIENT HEALTH QUESTIONNAIRE - PHQ9: CLINICAL INTERPRETATION OF PHQ2 SCORE: 0

## 2022-08-03 NOTE — ASSESSMENT & PLAN NOTE
The patient does not recall when they last had a Tdap booster.  The patient is also interested in routine blood work as well as routine STD screening.    Plan  - Will order Tdap booster  - Will order CBC, CMP, lipid panel, thyroid-stimulating hormone, hep C, STI, HIV labs

## 2022-08-03 NOTE — PROGRESS NOTES
New Patient  CC: Establish Care with Primary Care Physician     Chief Complaint   Patient presents with   • New Patient     Establish care   • Orders Needed     Maybe a sleep study and maybe lab request       HPI:    Jarvis Perez is a 37 y.o. male who presents today to establish care.  Patient also comes in with a chronic complaint of sensation of incomplete bladder emptying.  This condition is precipitated by ejaculation.  It does not come on spontaneously. No associated discharge, dysuria, increased frequency, or incontinence.  The patient does endorse taking his Lexapro as prescribed.    The patient is also interested in evaluation for sleep apnea.  Mr. Perez feels tired during the day, has had several episodes of gasping noted while sleeping by an ex-girlfriend, and his gender represents a risk factor.    The patient has hx of psychiatric disorder, of which the patient states OCD is the primary diagnosis. This is managed out of Dr. Jameson' office.    ROS:     Review of Systems   Constitutional: Negative.  Negative for chills and fever.   HENT: Negative.  Negative for hearing loss and sore throat.    Eyes: Negative.  Negative for blurred vision and double vision.   Respiratory: Negative.  Negative for cough, shortness of breath and wheezing.    Cardiovascular: Negative.  Negative for chest pain and palpitations.   Gastrointestinal: Negative.  Negative for abdominal pain and nausea.   Genitourinary: Negative.  Negative for dysuria, frequency, hematuria and urgency.   Musculoskeletal: Negative.    Skin: Negative.  Negative for itching and rash.   Neurological: Negative.        Past Medical History:   Diagnosis Date   • Psychiatric disorder     anxiety,  OCD       Patient Active Problem List    Diagnosis Date Noted   • OCD (obsessive compulsive disorder) 08/03/2022   • MDD (major depressive disorder) 08/03/2022   • VANESSA (generalized anxiety disorder) 08/03/2022   • Tobacco abuse 08/03/2022   • Sleep  "apnea-like behavior 08/03/2022   • Feeling of incomplete bladder emptying 08/03/2022       No past surgical history on file.    No family history on file.    Social History     Tobacco Use   • Smoking status: Current Every Day Smoker     Packs/day: 1.00     Types: Cigarettes   • Smokeless tobacco: Never Used   Vaping Use   • Vaping Use: Former   • Substances: Nicotine   Substance Use Topics   • Alcohol use: Not Currently   • Drug use: No       Current Outpatient Medications   Medication Sig Dispense Refill   • escitalopram (LEXAPRO) 20 MG tablet Take 10-20 mg by mouth every day.     • escitalopram (LEXAPRO) 10 MG Tab Take 10 mg by mouth every day. (Patient not taking: Reported on 8/3/2022)       No current facility-administered medications for this visit.       Allergies:    Food and Penicillins    Physical Exam:    /69 (BP Location: Left arm, Patient Position: Sitting, BP Cuff Size: Adult)   Pulse 65   Temp 36.9 °C (98.4 °F) (Temporal)   Ht 1.74 m (5' 8.5\")   Wt 76.8 kg (169 lb 6.4 oz)   SpO2 96%   BMI 25.38 kg/m²     General: Well-developed, well-nourished male in no distress  HEENT: NC/AT  Eyes: Conjuntiva without any obvious injection or erythema.   Lungs: Clear to auscultation bilaterally with good respiratory effort.  No wheezes, crackles or rhonci are heard.  Heart: Normal rate, regular rhythm with no murmurs, rubs or gallops heard.  Abdomen: Soft, non-tender, non-distended. No guarding or rigidity. No organomegaly noted.  Extremites: No cyanosis/clubbing/edema. No obvious deformities.  Skin: Warm and dry.  No visible rashes.  Neuro: Alert & oriented, grossly non-focal  Psych:  Affect normal, mood normal, judgment normal.  Genital: No visible rash. Testicles symmetrical without swelling. Epidiymis non-tender to palpation bilaterally.     Assessment and Plan:     Tobacco abuse  Patient expresses understanding of the harm of smoking tobacco products.  He is interested in smoking cessation.  We will " begin treatment with nicotine replacement.    Plan  -Begin nicotine patches 21 mg daily  -Reassess efficacy of nicotine patches with patient at next visit    Sleep apnea-like behavior  Patient scored a 3 on STOP-BANG criteria.  This denotes high risk for sleep apnea and likely warrants a sleep study for evaluation.    Plan  Sleep medicine referral    Routine adult health maintenance  The patient does not recall when they last had a Tdap booster.  The patient is also interested in routine blood work as well as routine STD screening.    Plan  - Will order Tdap booster  - Will order CBC, CMP, lipid panel, thyroid-stimulating hormone, hep C, STI, HIV labs    Feeling of incomplete bladder emptying  Provoked post ejaculation.  This is a known side effect of SSRIs.  Unlikely epididymitis due to benign exam.  Likely benign, but does warrant some work-up.    Plan  - Order for urinalysis  -We will schedule close follow-up if urinalysis is abnormal    All imaging results and lab results and consult notes are reviewed at this visit.    No follow-ups on file.    Signed by: Alex Smallwood M.D.

## 2022-08-03 NOTE — ASSESSMENT & PLAN NOTE
Patient expresses understanding of the harm of smoking tobacco products.  He is interested in smoking cessation.  We will begin treatment with nicotine replacement.    Plan  -Begin nicotine patches 21 mg daily  -Reassess efficacy of nicotine patches with patient at next visit

## 2022-08-03 NOTE — ASSESSMENT & PLAN NOTE
Patient scored a 3 on STOP-BANG criteria.  This denotes high risk for sleep apnea and likely warrants a sleep study for evaluation.    Plan  Sleep medicine referral

## 2022-08-03 NOTE — ASSESSMENT & PLAN NOTE
Provoked post ejaculation.  This is a known side effect of SSRIs.  Unlikely epididymitis due to benign exam.  Likely benign, but does warrant some work-up.    Plan  - Order for urinalysis  -We will schedule close follow-up if urinalysis is abnormal

## 2022-08-05 ENCOUNTER — TELEPHONE (OUTPATIENT)
Dept: INTERNAL MEDICINE | Facility: OTHER | Age: 37
End: 2022-08-05
Payer: MEDICAID

## 2022-08-05 NOTE — TELEPHONE ENCOUNTER
Phone Number Called: [phone]    Call outcome: Did not leave a detailed message. Requested patient to call back.    Message: left message to call back

## 2022-08-05 NOTE — TELEPHONE ENCOUNTER
At the last visit, this patient was counseled that we would order a Tdap vaccination that would be administered the next time he visited her pharmacy.  I entered an order for a Tdap to be administered at the clinic in error. Unfortunately, I instructed the patient to checkout before receiving a Tdap booster here.  Can we call the patient to clarify that he will have to request the Tdap booster the next time he visits the pharmacy? They should be able to administer it without an order if the patient requests it. Thank you.

## 2022-08-09 NOTE — TELEPHONE ENCOUNTER
Spoke with pt letting him know. He understood, will go to [facility] to get labs done and will also get vaccine at pharmacy

## 2022-09-15 ENCOUNTER — HOSPITAL ENCOUNTER (OUTPATIENT)
Dept: LAB | Facility: MEDICAL CENTER | Age: 37
End: 2022-09-15
Payer: MEDICAID

## 2022-09-15 DIAGNOSIS — Z00.00 ROUTINE ADULT HEALTH MAINTENANCE: ICD-10-CM

## 2022-09-15 DIAGNOSIS — R39.14 FEELING OF INCOMPLETE BLADDER EMPTYING: ICD-10-CM

## 2022-09-15 LAB
ALBUMIN SERPL BCP-MCNC: 4.5 G/DL (ref 3.2–4.9)
ALBUMIN/GLOB SERPL: 1.9 G/DL
ALP SERPL-CCNC: 90 U/L (ref 30–99)
ALT SERPL-CCNC: 11 U/L (ref 2–50)
ANION GAP SERPL CALC-SCNC: 9 MMOL/L (ref 7–16)
APPEARANCE UR: CLEAR
AST SERPL-CCNC: 17 U/L (ref 12–45)
BACTERIA #/AREA URNS HPF: NEGATIVE /HPF
BASOPHILS # BLD AUTO: 0.8 % (ref 0–1.8)
BASOPHILS # BLD: 0.08 K/UL (ref 0–0.12)
BILIRUB SERPL-MCNC: 0.5 MG/DL (ref 0.1–1.5)
BILIRUB UR QL STRIP.AUTO: NEGATIVE
BUN SERPL-MCNC: 15 MG/DL (ref 8–22)
CALCIUM SERPL-MCNC: 9.2 MG/DL (ref 8.5–10.5)
CAOX CRY #/AREA URNS HPF: ABNORMAL /HPF
CHLORIDE SERPL-SCNC: 106 MMOL/L (ref 96–112)
CHOLEST SERPL-MCNC: 148 MG/DL (ref 100–199)
CO2 SERPL-SCNC: 24 MMOL/L (ref 20–33)
COLOR UR: YELLOW
CREAT SERPL-MCNC: 1.12 MG/DL (ref 0.5–1.4)
EOSINOPHIL # BLD AUTO: 0.16 K/UL (ref 0–0.51)
EOSINOPHIL NFR BLD: 1.7 % (ref 0–6.9)
EPI CELLS #/AREA URNS HPF: NEGATIVE /HPF
ERYTHROCYTE [DISTWIDTH] IN BLOOD BY AUTOMATED COUNT: 42.5 FL (ref 35.9–50)
GFR SERPLBLD CREATININE-BSD FMLA CKD-EPI: 88 ML/MIN/1.73 M 2
GLOBULIN SER CALC-MCNC: 2.4 G/DL (ref 1.9–3.5)
GLUCOSE SERPL-MCNC: 93 MG/DL (ref 65–99)
GLUCOSE UR STRIP.AUTO-MCNC: NEGATIVE MG/DL
HBV CORE AB SERPL QL IA: NONREACTIVE
HBV SURFACE AB SERPL IA-ACNC: 16.3 MIU/ML (ref 0–10)
HBV SURFACE AG SER QL: NORMAL
HCT VFR BLD AUTO: 52.1 % (ref 42–52)
HCV AB SER QL: NORMAL
HDLC SERPL-MCNC: 48 MG/DL
HGB BLD-MCNC: 17.4 G/DL (ref 14–18)
HIV 1+2 AB+HIV1 P24 AG SERPL QL IA: NORMAL
HYALINE CASTS #/AREA URNS LPF: ABNORMAL /LPF
IMM GRANULOCYTES # BLD AUTO: 0.03 K/UL (ref 0–0.11)
IMM GRANULOCYTES NFR BLD AUTO: 0.3 % (ref 0–0.9)
KETONES UR STRIP.AUTO-MCNC: NEGATIVE MG/DL
LDLC SERPL CALC-MCNC: 84 MG/DL
LEUKOCYTE ESTERASE UR QL STRIP.AUTO: NEGATIVE
LYMPHOCYTES # BLD AUTO: 2.79 K/UL (ref 1–4.8)
LYMPHOCYTES NFR BLD: 29.6 % (ref 22–41)
MCH RBC QN AUTO: 30.3 PG (ref 27–33)
MCHC RBC AUTO-ENTMCNC: 33.4 G/DL (ref 33.7–35.3)
MCV RBC AUTO: 90.8 FL (ref 81.4–97.8)
MICRO URNS: ABNORMAL
MONOCYTES # BLD AUTO: 0.53 K/UL (ref 0–0.85)
MONOCYTES NFR BLD AUTO: 5.6 % (ref 0–13.4)
NEUTROPHILS # BLD AUTO: 5.85 K/UL (ref 1.82–7.42)
NEUTROPHILS NFR BLD: 62 % (ref 44–72)
NITRITE UR QL STRIP.AUTO: NEGATIVE
NRBC # BLD AUTO: 0 K/UL
NRBC BLD-RTO: 0 /100 WBC
PH UR STRIP.AUTO: 5.5 [PH] (ref 5–8)
PLATELET # BLD AUTO: 225 K/UL (ref 164–446)
PMV BLD AUTO: 11.2 FL (ref 9–12.9)
POTASSIUM SERPL-SCNC: 4.6 MMOL/L (ref 3.6–5.5)
PROT SERPL-MCNC: 6.9 G/DL (ref 6–8.2)
PROT UR QL STRIP: NEGATIVE MG/DL
RBC # BLD AUTO: 5.74 M/UL (ref 4.7–6.1)
RBC # URNS HPF: ABNORMAL /HPF
RBC UR QL AUTO: ABNORMAL
SODIUM SERPL-SCNC: 139 MMOL/L (ref 135–145)
SP GR UR STRIP.AUTO: 1.03
T PALLIDUM AB SER QL IA: NORMAL
TRIGL SERPL-MCNC: 82 MG/DL (ref 0–149)
TSH SERPL DL<=0.005 MIU/L-ACNC: 1.6 UIU/ML (ref 0.38–5.33)
UROBILINOGEN UR STRIP.AUTO-MCNC: 0.2 MG/DL
WBC # BLD AUTO: 9.4 K/UL (ref 4.8–10.8)
WBC #/AREA URNS HPF: ABNORMAL /HPF

## 2022-09-15 PROCEDURE — 84443 ASSAY THYROID STIM HORMONE: CPT

## 2022-09-15 PROCEDURE — 86780 TREPONEMA PALLIDUM: CPT

## 2022-09-15 PROCEDURE — 80061 LIPID PANEL: CPT

## 2022-09-15 PROCEDURE — 86706 HEP B SURFACE ANTIBODY: CPT

## 2022-09-15 PROCEDURE — 87389 HIV-1 AG W/HIV-1&-2 AB AG IA: CPT

## 2022-09-15 PROCEDURE — 87340 HEPATITIS B SURFACE AG IA: CPT

## 2022-09-15 PROCEDURE — 86803 HEPATITIS C AB TEST: CPT

## 2022-09-15 PROCEDURE — 86704 HEP B CORE ANTIBODY TOTAL: CPT

## 2022-09-15 PROCEDURE — 85025 COMPLETE CBC W/AUTO DIFF WBC: CPT

## 2022-09-15 PROCEDURE — 87661 TRICHOMONAS VAGINALIS AMPLIF: CPT

## 2022-09-15 PROCEDURE — 87491 CHLMYD TRACH DNA AMP PROBE: CPT

## 2022-09-15 PROCEDURE — 81001 URINALYSIS AUTO W/SCOPE: CPT

## 2022-09-15 PROCEDURE — 87591 N.GONORRHOEAE DNA AMP PROB: CPT

## 2022-09-15 PROCEDURE — 36415 COLL VENOUS BLD VENIPUNCTURE: CPT

## 2022-09-15 PROCEDURE — 80053 COMPREHEN METABOLIC PANEL: CPT

## 2022-09-16 ENCOUNTER — TELEPHONE (OUTPATIENT)
Dept: INTERNAL MEDICINE | Facility: OTHER | Age: 37
End: 2022-09-16
Payer: MEDICAID

## 2022-09-16 DIAGNOSIS — R31.29 MICROSCOPIC HEMATURIA: ICD-10-CM

## 2022-09-16 DIAGNOSIS — R31.21 ASYMPTOMATIC MICROSCOPIC HEMATURIA: ICD-10-CM

## 2022-09-16 LAB
C TRACH DNA SPEC QL NAA+PROBE: NEGATIVE
N GONORRHOEA DNA SPEC QL NAA+PROBE: NEGATIVE
SPECIMEN SOURCE: NORMAL

## 2022-09-16 NOTE — TELEPHONE ENCOUNTER
Mr. Perez's work-up for urinary hesitancy and difficulty initiating stream came back positive for microscopic hematuria, or small amounts of blood seen under a microscope in the urine.  A 1-off case of microscopic hematuria is often benign, especially in the setting of recent trauma or recently passed kidney stone.  However the patient will need to be seen nonurgently for further work-up to confirm this hematuria is from a benign cause.      If the patient's and my schedule allows, I would like to see the patient either next week or when I am back in the clinic on the week of October 24.

## 2022-09-17 LAB
SPEC CONTAINER SPEC: NORMAL
SPECIMEN SOURCE: NORMAL
T VAGINALIS RRNA SPEC QL NAA+PROBE: NEGATIVE

## 2022-09-19 NOTE — TELEPHONE ENCOUNTER
Attempted to contact patient twice at the phone number provided, did not  the phone. Left voicemail instructing patient to call back to the  to get back in touch with me if he would like to discuss his lab results.

## 2022-09-19 NOTE — TELEPHONE ENCOUNTER
Patient called back returning your call, he is requesting that you give him a call back regarding these results and has questions, he can be reached at [phone]

## 2022-09-23 RX ORDER — NICOTINE 21 MG/24HR
1 PATCH, TRANSDERMAL 24 HOURS TRANSDERMAL EVERY 24 HOURS
Qty: 28 PATCH | Refills: 6 | Status: SHIPPED | OUTPATIENT
Start: 2022-09-23 | End: 2023-04-18

## 2022-09-23 NOTE — TELEPHONE ENCOUNTER
Patient called back and states that he is available the rest of the day for a call back regarding his lab results at phone number [phone], he also stated that he is working nights next week so anytime in the afternoon next week he is available as well.

## 2022-09-23 NOTE — TELEPHONE ENCOUNTER
Contacted patient over telephone.  The patient and I discussed his recent lab work, including results of CBC, CMP, TSH, STD panel, and urinalysis.  We spoke extensively about the patient's finding of microscopic hematuria.  While very rare in a 37-year-old male, Mr. Perez does have over 10-year pack history representing risk factor for bladder cancer. No other known risk factors, such as family history or history of exposure to heavy industrial/chemical manufacturing occupations. The seriousness of this disease merits further workup, but microscopic hematuria at this level is still very likely benign.  The patient does express having a strenuous workout as well as masturbating before prior urinalysis, which is the most likely cause.  Additionally, urinary hesitancy and incomplete voiding are known side effects of Lexapro, which the patient is currently taking.  The plan going forward will be to repeat urinalysis studies and refrain from heavy exercise or masturbation prior to giving urine for the next study, to be completed approximately 1 month and 6 months from initial urinalysis.    Patient also reports he did not receive nicotine patch prescription.  I have reordered this.

## 2022-10-04 ENCOUNTER — HOSPITAL ENCOUNTER (OUTPATIENT)
Dept: LAB | Facility: MEDICAL CENTER | Age: 37
End: 2022-10-04
Payer: MEDICAID

## 2022-10-04 DIAGNOSIS — R31.29 MICROSCOPIC HEMATURIA: ICD-10-CM

## 2022-10-04 LAB
APPEARANCE UR: CLEAR
BACTERIA #/AREA URNS HPF: NEGATIVE /HPF
BILIRUB UR QL STRIP.AUTO: NEGATIVE
COLOR UR: YELLOW
EPI CELLS #/AREA URNS HPF: NEGATIVE /HPF
GLUCOSE UR STRIP.AUTO-MCNC: NEGATIVE MG/DL
HYALINE CASTS #/AREA URNS LPF: ABNORMAL /LPF
KETONES UR STRIP.AUTO-MCNC: NEGATIVE MG/DL
LEUKOCYTE ESTERASE UR QL STRIP.AUTO: NEGATIVE
MICRO URNS: ABNORMAL
NITRITE UR QL STRIP.AUTO: NEGATIVE
PH UR STRIP.AUTO: 6 [PH] (ref 5–8)
PROT UR QL STRIP: NEGATIVE MG/DL
RBC # URNS HPF: ABNORMAL /HPF
RBC UR QL AUTO: ABNORMAL
SP GR UR STRIP.AUTO: 1.01
UROBILINOGEN UR STRIP.AUTO-MCNC: 0.2 MG/DL
WBC #/AREA URNS HPF: ABNORMAL /HPF

## 2022-10-04 PROCEDURE — 81001 URINALYSIS AUTO W/SCOPE: CPT

## 2022-10-07 ENCOUNTER — TELEPHONE (OUTPATIENT)
Dept: INTERNAL MEDICINE | Facility: OTHER | Age: 37
End: 2022-10-07
Payer: MEDICAID

## 2022-10-07 NOTE — TELEPHONE ENCOUNTER
Reached out to Mr. Perez concerning the results of his most recent urinalysis. I attempted to call twice and was unable to reach the patient. The patient was instructed to call the clinic to get in touch with me to speak about the results of his most recent urinalysis if desired.

## 2022-10-25 ENCOUNTER — OFFICE VISIT (OUTPATIENT)
Dept: INTERNAL MEDICINE | Facility: OTHER | Age: 37
End: 2022-10-25
Payer: MEDICAID

## 2022-10-25 VITALS
HEIGHT: 69 IN | WEIGHT: 169.9 LBS | SYSTOLIC BLOOD PRESSURE: 114 MMHG | DIASTOLIC BLOOD PRESSURE: 68 MMHG | TEMPERATURE: 97.8 F | HEART RATE: 80 BPM | OXYGEN SATURATION: 95 % | BODY MASS INDEX: 25.17 KG/M2

## 2022-10-25 DIAGNOSIS — R31.21 ASYMPTOMATIC MICROSCOPIC HEMATURIA: ICD-10-CM

## 2022-10-25 DIAGNOSIS — R31.29 MICROSCOPIC HEMATURIA: ICD-10-CM

## 2022-10-25 DIAGNOSIS — Z72.0 TOBACCO ABUSE: ICD-10-CM

## 2022-10-25 PROCEDURE — 99214 OFFICE O/P EST MOD 30 MIN: CPT | Mod: GC

## 2022-10-25 ASSESSMENT — FIBROSIS 4 INDEX: FIB4 SCORE: 0.8

## 2022-10-25 ASSESSMENT — PATIENT HEALTH QUESTIONNAIRE - PHQ9: CLINICAL INTERPRETATION OF PHQ2 SCORE: 0

## 2022-10-26 PROBLEM — R31.29 MICROSCOPIC HEMATURIA: Status: ACTIVE | Noted: 2022-10-26

## 2022-10-26 ASSESSMENT — ENCOUNTER SYMPTOMS
CONSTIPATION: 0
NEUROLOGICAL NEGATIVE: 1
FLANK PAIN: 0
MUSCULOSKELETAL NEGATIVE: 1
DIARRHEA: 0
CHILLS: 0
CONSTITUTIONAL NEGATIVE: 1
RESPIRATORY NEGATIVE: 1
FEVER: 0
EYES NEGATIVE: 1
GASTROINTESTINAL NEGATIVE: 1
CARDIOVASCULAR NEGATIVE: 1
WEIGHT LOSS: 0
PSYCHIATRIC NEGATIVE: 1
ABDOMINAL PAIN: 0

## 2022-10-27 NOTE — ASSESSMENT & PLAN NOTE
The patient was counseled extensively on the implications of his microscopic hematuria.  At this visit and previous phone calls, the differential for explaining etiology of his microscopic hematuria includes trauma secondary to masturbation, hematospermia, and much less likely bladder cancer.  Patient does have 1 risk factor for bladder cancer: Smoking history greater than 10 pack years.  In the 2 urinalyses done since our last visit, patient did have some improvement in the number of red blood cells per high-power field seen when he was instructed to avoid heavy exercise and masturbation prior to collection of the urine sample.  Given his relatively low likelihood of presenting with bladder cancer and relatively high likelihood of having a false positive that would require invasive work-up, a shared decision was made to defer imaging until a third urinalysis is complete including cytology.  The patient was instructed to refrain from masturbation and moderate to vigorous exercise in the 10 days leading up to his next urinalysis.    Plan  - Repeat urinalysis with cytology

## 2022-10-27 NOTE — PROGRESS NOTES
Established Patient    Patient Care Team:  Alex Smallwood M.D. as PCP - General (Internal Medicine)    CC:   Chief Complaint   Patient presents with    Lab Results     Patient history blood in urine       HPI:  Jarvis Perez is a 37 y.o. male who presents today with the following Chief Complaint(s): Follow up for lab results.    Patient has no new complaints or symptoms since our last visit.  He continues to endorse sensation of incomplete bladder emptying.  Patient denies any mir hematuria, dysuria, flank pain, passage of stones, or clinically significant changes to the color of his urine.     Patient also continues to express interest in smoking cessation at this visit.  The patient is currently attempting harm reduction strategies such as substituting cigarettes for vape and has cut down to three quarters of a pack per day.    ROS:      Review of Systems   Constitutional: Negative.  Negative for chills, fever, malaise/fatigue and weight loss.   HENT: Negative.     Eyes: Negative.    Respiratory: Negative.     Cardiovascular: Negative.    Gastrointestinal: Negative.  Negative for abdominal pain, constipation and diarrhea.   Genitourinary:  Negative for dysuria, flank pain, frequency, hematuria and urgency.   Musculoskeletal: Negative.    Skin: Negative.    Neurological: Negative.    Endo/Heme/Allergies: Negative.    Psychiatric/Behavioral: Negative.         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    Depression 12/13/2018    Herpesvirus infection 12/13/2018  "   Epidermoid cyst of skin of scalp 06/22/2017    Obsessive-compulsive disorder, unspecified 02/27/2017    Tinea cruris 02/27/2017     Social History     Tobacco Use    Smoking status: Every Day     Packs/day: 1.00     Types: Cigarettes    Smokeless tobacco: Never   Vaping Use    Vaping Use: Former    Substances: Nicotine   Substance Use Topics    Alcohol use: Not Currently    Drug use: No     Current Outpatient Medications   Medication Sig Dispense Refill    nicotine (NICODERM) 21 MG/24HR PATCH 24 HR Place 1 Patch on the skin every 24 hours. 28 Patch 6    escitalopram (LEXAPRO) 20 MG tablet Take 10-20 mg by mouth every day.       No current facility-administered medications for this visit.     No past surgical history on file.  Family History   Problem Relation Age of Onset    No Known Problems Mother     Coronary artery disease Father     Stroke Father     Stroke Paternal Grandmother     Stroke Paternal Grandfather      Social History     Tobacco Use    Smoking status: Every Day     Packs/day: 1.00     Types: Cigarettes    Smokeless tobacco: Never   Vaping Use    Vaping Use: Former    Substances: Nicotine   Substance Use Topics    Alcohol use: Not Currently    Drug use: No       Allergies:  Food and Penicillins    Physical Exam:  /68 (BP Location: Left arm, Patient Position: Sitting, BP Cuff Size: Adult)   Pulse 80   Temp 36.6 °C (97.8 °F) (Temporal)   Ht 1.753 m (5' 9\")   Wt 77.1 kg (169 lb 14.4 oz)   SpO2 95%   BMI 25.09 kg/m²     General: Well-developed, well-nourished male in no distress  HEENT: NC/AT  Eyes: Conjuntiva without any obvious injection or erythema.   Mouth: mucous membranes moist, no erythema  Neck: Supple, non-tender with no thyromegaly or adenopathy.  Lungs: Clear to auscultation bilaterally with good respiratory effort.  No wheezes, crackles or rhonci are heard.  Heart: Normal rate, regular rhythm with no murmurs, rubs or gallops heard.  Abdomen: Soft, non-tender, non-distended. No " guarding or rigidity. No organomegaly noted.  Extremites: No cyanosis/clubbing/edema. No obvious deformities.  Skin: Warm and dry.  No visible rashes.  Neuro: Alert & oriented, grossly non-focal  Psych: Patient is awake, alert and oriented with recent and remote memory intact. Affect normal, mood normal, judgment normal.      Assessment and Plan:     Microscopic hematuria  The patient was counseled extensively on the implications of his microscopic hematuria.  At this visit and previous phone calls, the differential for explaining etiology of his microscopic hematuria includes trauma secondary to masturbation, hematospermia, and much less likely bladder cancer.  Patient does have 1 risk factor for bladder cancer: Smoking history greater than 10 pack years.  In the 2 urinalyses done since our last visit, patient did have some improvement in the number of red blood cells per high-power field seen when he was instructed to avoid heavy exercise and masturbation prior to collection of the urine sample.  Given his relatively low likelihood of presenting with bladder cancer and relatively high likelihood of having a false positive that would require invasive work-up, a shared decision was made to defer imaging until a third urinalysis is complete including cytology.  The patient was instructed to refrain from masturbation and moderate to vigorous exercise in the 10 days leading up to his next urinalysis.    Plan  - Repeat urinalysis with cytology    Tobacco abuse  The patient is endorsing he is able to replace smoking with vaping.  While not ideal, the patient is reducing his overall risk for cancer and CAD by transitioning to vaping.  The patient was encouraged to utilize his prescription for nicotine patches for replacement.    Plan  - Continue nicotine patches       All imaging results and lab results and consult notes are reviewed at this visit.        Signed by: Alex Smallwood M.D.  PGY I

## 2022-10-27 NOTE — ASSESSMENT & PLAN NOTE
The patient is endorsing he is able to replace smoking with vaping.  While not ideal, the patient is reducing his overall risk for cancer and CAD by transitioning to vaping.  The patient was encouraged to utilize his prescription for nicotine patches for replacement.    Plan  - Continue nicotine patches

## 2022-12-06 ENCOUNTER — HOSPITAL ENCOUNTER (OUTPATIENT)
Facility: MEDICAL CENTER | Age: 37
End: 2022-12-06
Payer: MEDICAID

## 2022-12-06 DIAGNOSIS — R31.21 ASYMPTOMATIC MICROSCOPIC HEMATURIA: ICD-10-CM

## 2022-12-06 LAB
APPEARANCE UR: CLEAR
BACTERIA #/AREA URNS HPF: NEGATIVE /HPF
BILIRUB UR QL STRIP.AUTO: NEGATIVE
COLOR UR: YELLOW
EPI CELLS #/AREA URNS HPF: NEGATIVE /HPF
GLUCOSE UR STRIP.AUTO-MCNC: NEGATIVE MG/DL
HYALINE CASTS #/AREA URNS LPF: ABNORMAL /LPF
KETONES UR STRIP.AUTO-MCNC: NEGATIVE MG/DL
LEUKOCYTE ESTERASE UR QL STRIP.AUTO: NEGATIVE
MICRO URNS: ABNORMAL
NITRITE UR QL STRIP.AUTO: NEGATIVE
PH UR STRIP.AUTO: 5.5 [PH] (ref 5–8)
PROT UR QL STRIP: NEGATIVE MG/DL
RBC # URNS HPF: ABNORMAL /HPF
RBC UR QL AUTO: ABNORMAL
SP GR UR STRIP.AUTO: 1.02
UROBILINOGEN UR STRIP.AUTO-MCNC: 0.2 MG/DL
WBC #/AREA URNS HPF: ABNORMAL /HPF

## 2022-12-06 PROCEDURE — 81001 URINALYSIS AUTO W/SCOPE: CPT

## 2022-12-06 PROCEDURE — 88112 CYTOPATH CELL ENHANCE TECH: CPT

## 2022-12-07 ENCOUNTER — APPOINTMENT (OUTPATIENT)
Dept: INTERNAL MEDICINE | Facility: OTHER | Age: 37
End: 2022-12-07
Payer: MEDICAID

## 2022-12-07 LAB — CYTOLOGY REG CYTOL: NORMAL

## 2022-12-13 ENCOUNTER — TELEMEDICINE (OUTPATIENT)
Dept: INTERNAL MEDICINE | Facility: OTHER | Age: 37
End: 2022-12-13
Payer: MEDICAID

## 2022-12-13 VITALS — WEIGHT: 168 LBS | HEIGHT: 69 IN | BODY MASS INDEX: 24.88 KG/M2

## 2022-12-13 DIAGNOSIS — R82.89: ICD-10-CM

## 2022-12-13 DIAGNOSIS — Z72.0 TOBACCO ABUSE: ICD-10-CM

## 2022-12-13 PROCEDURE — 99213 OFFICE O/P EST LOW 20 MIN: CPT | Mod: GT,GE

## 2022-12-13 ASSESSMENT — FIBROSIS 4 INDEX: FIB4 SCORE: 0.8

## 2022-12-14 ASSESSMENT — ENCOUNTER SYMPTOMS
FLANK PAIN: 0
NEUROLOGICAL NEGATIVE: 1
GASTROINTESTINAL NEGATIVE: 1
MUSCULOSKELETAL NEGATIVE: 1
PSYCHIATRIC NEGATIVE: 1
EYES NEGATIVE: 1
CONSTITUTIONAL NEGATIVE: 1
RESPIRATORY NEGATIVE: 1
CARDIOVASCULAR NEGATIVE: 1

## 2022-12-14 NOTE — ASSESSMENT & PLAN NOTE
Issue with prescription for nicotine patches resolved.  Patient is to begin smoking cessation therapy with nicotine patches as well as nicotine gum for breakthrough cravings.  - Nicotine patch 21 mg per 24 hours  - Nicotine gum 2 mg as needed for breakthrough cravings for cigarettes

## 2022-12-14 NOTE — ASSESSMENT & PLAN NOTE
Unfortunately high clinical suspicion for low-grade urothelial carcinoma in the setting of positive urine cytology for urothelial cell aggregates.  The patient does not have any traumatic or auto inflammatory etiologies obvious, including a lack of history of kidney stones and strict instructions not to engage in exercise or masturbation prior to lab testing.  The patient will require preliminary work-up and referral to urology.  - Urgent CT abdomen pelvis with and without contrast  - Urgent referral to urology

## 2022-12-14 NOTE — PROGRESS NOTES
Established Patient    Patient Care Team:  Alex Smallwood M.D. as PCP - General (Internal Medicine)    This evaluation was conducted via Zoom using secure and encrypted videoconferencing technology. The patient was in their home in the [facility].    The patient's identity was confirmed and verbal consent was obtained for this virtual visit.     CC:   Chief Complaint   Patient presents with    Follow-Up    Lab Results     UA    Medication Problem     Unable to  Nicotine Patch     HPI:  Jarvis Perez is a 37 y.o. male who presents today with the following Chief Complaint(s): Follow up for lab results and medication problem.    Patient seen to go over repeat urinalysis, this time with urine cytology for microscopic hematuria as well as symptoms of incomplete bladder emptying.  Patient continues to endorse incomplete bladder emptying, no mir hematuria, no dysuria, no flank pain, no history of kidney stones.  Patient has no known history of inflammatory disorders and urinalysis would indicate no active inflammation in the kidneys.  Patient states he  refrain from exercise, sexual intercourse, and masturbation for 10 days prior to urinalysis.    The patient was counseled on the high specificity of detecting abnormal urine cytology.  Patient was appropriately distraught by this news and left the call before the patient was staffed with Dr. Gutierrez to discuss these results with his family.    The patient also reports he has not been able to  his nicotine patches and is not aware why these are still being held at the pharmacy.  I discussed this with the pharmacy, and they had previously no attached ALONZO # or NPI number.  The pharmacy was provided with my NPI number such that the pharmacy could fill the order.    ROS:       Review of Systems   Constitutional: Negative.    HENT: Negative.     Eyes: Negative.    Respiratory: Negative.     Cardiovascular: Negative.    Gastrointestinal: Negative.     Genitourinary: Negative.  Negative for dysuria, flank pain, frequency, hematuria and urgency.   Musculoskeletal: Negative.    Skin: Negative.    Neurological: Negative.    Endo/Heme/Allergies: Negative.    Psychiatric/Behavioral: Negative.         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    Depression 12/13/2018    Herpesvirus infection 12/13/2018    Epidermoid cyst of skin of scalp 06/22/2017    Obsessive-compulsive disorder, unspecified 02/27/2017    Tinea cruris 02/27/2017     Social History     Tobacco Use    Smoking status: Every Day     Packs/day: 0.50     Types: Cigarettes    Smokeless tobacco: Never    Tobacco comments:     3/4 of a pack a day- trying to quit   Vaping Use    Vaping Use: Every day    Substances: Nicotine, Flavoring    Devices: Disposable   Substance Use Topics    Alcohol use: Not Currently    Drug use: No     Current Outpatient Medications   Medication Sig Dispense Refill    nicotine polacrilex (NICORETTE) 2 MG Gum Take 1 Each by mouth as needed for Smoking Cessation. 120 Each 1    escitalopram (LEXAPRO) 20 MG tablet Take 10-20 mg by mouth every day.      nicotine (NICODERM) 21 MG/24HR PATCH 24 HR Place 1 Patch on the skin every 24 hours. (Patient not taking: Reported on 12/13/2022) 28 Patch 6     No current facility-administered medications for this visit.     No past surgical history on file.  Family History   Problem Relation Age of Onset    No Known Problems Mother      "Coronary artery disease Father     Stroke Father     Stroke Paternal Grandmother     Stroke Paternal Grandfather      Social History     Tobacco Use    Smoking status: Every Day     Packs/day: 0.50     Types: Cigarettes    Smokeless tobacco: Never    Tobacco comments:     3/4 of a pack a day- trying to quit   Vaping Use    Vaping Use: Every day    Substances: Nicotine, Flavoring    Devices: Disposable   Substance Use Topics    Alcohol use: Not Currently    Drug use: No       Allergies:  Food and Penicillins    Physical Exam:  Ht 1.753 m (5' 9\") Comment: Patient reported- teled  Wt 76.2 kg (168 lb) Comment: Patient reported- telemed  BMI 24.81 kg/m²     Vitals obtained by patient:    Physical Exam:  Constitutional: Alert, no distress, well-groomed.  Skin: No rashes in visible areas.  Eye: Round. Conjunctiva clear, lids normal. No icterus.   ENMT: Lips pink without lesions, good dentition, moist mucous membranes. Phonation normal.  Neck: No masses, no thyromegaly. Moves freely without pain.  Respiratory: Unlabored respiratory effort, no cough or audible wheeze  Psych: Alert and oriented x3, normal affect and mood.      Assessment and Plan:   Low grade urothelial neoplasia present on urine cytology  Unfortunately high clinical suspicion for low-grade urothelial carcinoma in the setting of positive urine cytology for urothelial cell aggregates.  The patient does not have any traumatic or auto inflammatory etiologies obvious, including a lack of history of kidney stones and strict instructions not to engage in exercise or masturbation prior to lab testing.  The patient will require preliminary work-up and referral to urology.  - Urgent CT abdomen pelvis with and without contrast  - Urgent referral to urology    Tobacco abuse  Issue with prescription for nicotine patches resolved.  Patient is to begin smoking cessation therapy with nicotine patches as well as nicotine gum for breakthrough cravings.  - Nicotine patch 21 " mg per 24 hours  - Nicotine gum 2 mg as needed for breakthrough cravings for cigarettes     All imaging results and lab results and consult notes are reviewed at this visit.    Signed by: Alex Smallwood M.D.  PGY I

## 2022-12-15 NOTE — TELEPHONE ENCOUNTER
Nicotine Gum Issue- Pharmacy comment: Script Clarification:WHAT IS THE FREQUENCY?    Please update RX and resend

## 2022-12-30 ENCOUNTER — HOSPITAL ENCOUNTER (OUTPATIENT)
Dept: RADIOLOGY | Facility: MEDICAL CENTER | Age: 37
End: 2022-12-30
Payer: MEDICAID

## 2022-12-30 DIAGNOSIS — R82.89: ICD-10-CM

## 2022-12-30 PROCEDURE — 700117 HCHG RX CONTRAST REV CODE 255

## 2022-12-30 PROCEDURE — 74178 CT ABD&PLV WO CNTR FLWD CNTR: CPT

## 2022-12-30 RX ADMIN — IOHEXOL 100 ML: 350 INJECTION, SOLUTION INTRAVENOUS at 16:45

## 2023-01-05 ENCOUNTER — TELEPHONE (OUTPATIENT)
Dept: INTERNAL MEDICINE | Facility: OTHER | Age: 38
End: 2023-01-05
Payer: MEDICAID

## 2023-01-05 NOTE — TELEPHONE ENCOUNTER
VOICEMAIL  1. Caller Name: Jarvis Perez                      Call Back Number: [phone]    2. Message: patient called and lm wanting results on his CT Urogram. Please call the patient.    3. Patient approves office to leave a detailed voicemail/MyChart message: yes

## 2023-01-07 NOTE — TELEPHONE ENCOUNTER
The patient was called at the number provided, and did not  the phone. This provider left a detailed message reporting his CT Urogram findings as negative for any visible mass, but warned that this scan is not sensitive to find masses < 1 cm in diameter. I would continue to suggest establishing with a urologist who may be able to better rule in/out a mass more definitively with cystoscopy. Patient was instructed to call back the clinic if he had further questions.

## 2023-03-15 RX ORDER — ESCITALOPRAM OXALATE 20 MG/1
10-20 TABLET ORAL
Qty: 30 TABLET | Refills: 1 | Status: SHIPPED | OUTPATIENT
Start: 2023-03-15 | End: 2023-06-21

## 2023-03-15 NOTE — TELEPHONE ENCOUNTER
Received request via: Patient    Was the patient seen in the last year in this department? Yes    Does the patient have an active prescription (recently filled or refills available) for medication(s) requested? No    Does the patient have FDC Plus and need 100 day supply (blood pressure, diabetes and cholesterol meds only)? Patient does not have SCP

## 2023-04-18 ENCOUNTER — OFFICE VISIT (OUTPATIENT)
Dept: INTERNAL MEDICINE | Facility: OTHER | Age: 38
End: 2023-04-18
Payer: MEDICAID

## 2023-04-18 VITALS
HEART RATE: 83 BPM | SYSTOLIC BLOOD PRESSURE: 124 MMHG | DIASTOLIC BLOOD PRESSURE: 74 MMHG | TEMPERATURE: 96.7 F | OXYGEN SATURATION: 97 %

## 2023-04-18 DIAGNOSIS — R91.1 INCIDENTAL PULMONARY NODULE: ICD-10-CM

## 2023-04-18 DIAGNOSIS — Z23 ENCOUNTER FOR VACCINATION: ICD-10-CM

## 2023-04-18 PROCEDURE — 90715 TDAP VACCINE 7 YRS/> IM: CPT

## 2023-04-18 PROCEDURE — 90471 IMMUNIZATION ADMIN: CPT

## 2023-04-18 PROCEDURE — 99213 OFFICE O/P EST LOW 20 MIN: CPT | Mod: 25,GE

## 2023-04-18 RX ORDER — NICOTINE 21 MG/24HR
1 PATCH, TRANSDERMAL 24 HOURS TRANSDERMAL EVERY 24 HOURS
Qty: 28 PATCH | Refills: 1 | Status: SHIPPED | OUTPATIENT
Start: 2023-04-18 | End: 2023-08-31 | Stop reason: SDUPTHER

## 2023-04-18 ASSESSMENT — PATIENT HEALTH QUESTIONNAIRE - PHQ9
CLINICAL INTERPRETATION OF PHQ2 SCORE: 3
SUM OF ALL RESPONSES TO PHQ QUESTIONS 1-9: 10
5. POOR APPETITE OR OVEREATING: 0 - NOT AT ALL

## 2023-04-18 ASSESSMENT — FIBROSIS 4 INDEX: FIB4 SCORE: 0.82

## 2023-04-19 PROBLEM — R91.1 INCIDENTAL PULMONARY NODULE: Status: ACTIVE | Noted: 2023-04-19

## 2023-04-19 ASSESSMENT — ENCOUNTER SYMPTOMS
GASTROINTESTINAL NEGATIVE: 1
CARDIOVASCULAR NEGATIVE: 1
MUSCULOSKELETAL NEGATIVE: 1
SHORTNESS OF BREATH: 0
RESPIRATORY NEGATIVE: 1
COUGH: 0
DEPRESSION: 1
EYES NEGATIVE: 1
NEUROLOGICAL NEGATIVE: 1
FLANK PAIN: 0
NERVOUS/ANXIOUS: 1
CONSTITUTIONAL NEGATIVE: 1

## 2023-04-19 ASSESSMENT — LIFESTYLE VARIABLES: SUBSTANCE_ABUSE: 0

## 2023-04-19 NOTE — PROGRESS NOTES
"    Established Patient    Patient Care Team:  Alex Smallwood M.D. as PCP - General (Internal Medicine)    CC:   Chief Complaint   Patient presents with    Bladder Problem       HPI:  Jarvis Perez is a 38 y.o. male who presents today with the following Chief Complaint(s): Follow up for microscoptic hematuria, incomplete bladder emptying sensation, and smoking cessation.    The patient is now following with urology [place], who I have explained to the patient that symptoms, microscopic hematuria, as well as urothelial cells seen on cytology are likely related to a stricture seen within his urethra with unclear etiology.  Urology shows no's concerns for cancer at this time.  We will defer to urology for further management and work-up.    Patient also continues to work on smoking cessation.  The patient does report that he is having trouble quitting a habit of having at least 1 morning and 1 evening cigarette.  Patient associates this with how he has treated his anxiety and OCD in the past, and it \"gives me something to look forward to.\"  He does however find that the gum is very useful for cravings throughout the day.  The patient does report some anxiety related to recent work-up for urinary symptoms, but has not linked any of this to use of nicotine patches nor has he had any related diarrhea.    Patient also does feel some frustration that he is not making much more progress with treatment of anxiety and PTSD with psychiatry and psychology.  He does report that he may have some additional component of depression because he does not feel motivated to accomplish anything in life.  However, the patient does not express any intention to harm himself nor does he have any plan to end his life.      ROS:       Review of Systems   Constitutional: Negative.    HENT: Negative.     Eyes: Negative.    Respiratory: Negative.  Negative for cough and shortness of breath.    Cardiovascular: Negative.    Gastrointestinal: " Negative.    Genitourinary: Negative.  Negative for dysuria, flank pain, frequency, hematuria and urgency.   Musculoskeletal: Negative.    Skin: Negative.    Neurological: Negative.    Endo/Heme/Allergies: Negative.    Psychiatric/Behavioral:  Positive for depression. Negative for substance abuse and suicidal ideas. The patient is nervous/anxious.        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    Depression 12/13/2018    Herpesvirus infection 12/13/2018    Epidermoid cyst of skin of scalp 06/22/2017    Obsessive-compulsive disorder, unspecified 02/27/2017    Tinea cruris 02/27/2017     Social History     Tobacco Use    Smoking status: Every Day     Packs/day: 0.50     Types: Cigarettes    Smokeless tobacco: Never    Tobacco comments:     3/4 of a pack a day- trying to quit   Vaping Use    Vaping Use: Every day    Substances: Nicotine, Flavoring    Devices: Disposable   Substance Use Topics    Alcohol use: Not Currently    Drug use: No     Current Outpatient Medications   Medication Sig Dispense Refill    nicotine (NICODERM) 14 MG/24HR PATCH 24 HR Place 1 Patch on the skin every 24 hours. 28 Patch 1    escitalopram (LEXAPRO) 20 MG tablet Take 0.5-1 Tablets by mouth every day. 30 Tablet 1    nicotine polacrilex (NICORETTE) 2 MG Gum Take 1 Each by mouth 4 times a day as needed for Smoking Cessation (for breakthrough cravings). 100 Each 3     No current facility-administered medications for this visit.     No  "past surgical history on file.  Family History   Problem Relation Age of Onset    No Known Problems Mother     Coronary artery disease Father     Stroke Father     Stroke Paternal Grandmother     Stroke Paternal Grandfather      Social History     Tobacco Use    Smoking status: Every Day     Packs/day: 0.50     Types: Cigarettes    Smokeless tobacco: Never    Tobacco comments:     3/4 of a pack a day- trying to quit   Vaping Use    Vaping Use: Every day    Substances: Nicotine, Flavoring    Devices: Disposable   Substance Use Topics    Alcohol use: Not Currently    Drug use: No       Allergies:  Food and Penicillins    Physical Exam:  /74   Pulse 83   Temp 35.9 °C (96.7 °F) (Temporal)   Resp (P) 16   Ht (P) 1.753 m (5' 9\")   Wt (P) 81.6 kg (180 lb)   SpO2 97%   BMI (P) 26.58 kg/m²     General: Well-developed, well-nourished male in no distress  HEENT: NC/AT  Eyes: Conjuntiva without any obvious injection or erythema.   Mouth: mucous membranes moist, no erythema  Lungs: Clear to auscultation bilaterally with good respiratory effort.  No wheezes, crackles or rhonci are heard.  Extremites: No cyanosis/clubbing/edema. No obvious deformities.  Skin: Warm and dry.  No visible rashes.  Neuro: Alert & oriented, grossly non-focal  Psych: Patient is awake, alert and oriented with recent and remote memory intact. Affect normal, mood normal, judgment normal.    Assessment and Plan:   No problem-specific Assessment & Plan notes found for this encounter.       1. Encounter for vaccination  - Tdap Vaccine =>6YO IM    2. Smoking cessation  I spent >15 minutes with the patient counseling on cessation and walking through plan of nicotine replacement. We will begin to titrate down nicotine replacement to 14 mg patches  - Discontinue 21 mg nicotine patch  - Start 14 mg nicotine patch  - continue using gum as needed for cravings, including replacing morning and evening smoking habit.    3. VANESSA  4. PTSD  The patient " continues to work with psychiatry and psychology on these issues. The patient was provided additional resources including [employer] online application and advised to continue exploring options and different providers for mental health care.    All imaging results and lab results and consult notes are reviewed at this visit.    Signed by: Alex Smallwood M.D.  PGY I

## 2023-04-19 NOTE — PROGRESS NOTES
Teaching Physician Attestation      Level of Participation    I discussed with the resident physician the patient's history, exam, assessment and plan in detail.  Topics listed in my addendum were the focus of the visit.  Healthcare maintenance was not addressed this visit unless listed as a topic in my addendum.  I agree with plan as written along with the following additions/modifications:      Tobacco use  -Praised patient for his progress, will down titrate to 14 mg daily patch, he will attempt to substitute off of smoking cigarettes once in the morning and once at night by chewing gum instead, can also supplement with gum as needed on top of that although goal is to use gum sparingly.  Follow-up in 5 weeks.    Mood disorder  -Not fully addressed today, predominantly followed by psychology and establishing with psychiatry.  No SI or HI, no plan  Appreciate psychiatry and psychology support  -Anxiety resources handout given    Tdap today    Appreciate urology support    Return to clinic 5 weeks.

## 2023-05-23 ENCOUNTER — APPOINTMENT (OUTPATIENT)
Dept: INTERNAL MEDICINE | Facility: OTHER | Age: 38
End: 2023-05-23
Payer: MEDICAID

## 2023-06-21 RX ORDER — ESCITALOPRAM OXALATE 20 MG/1
10-20 TABLET ORAL
Qty: 30 TABLET | Refills: 1 | Status: SHIPPED | OUTPATIENT
Start: 2023-06-21 | End: 2023-11-09 | Stop reason: SDUPTHER

## 2023-08-31 ENCOUNTER — TELEPHONE (OUTPATIENT)
Dept: INTERNAL MEDICINE | Facility: OTHER | Age: 38
End: 2023-08-31
Payer: MEDICAID

## 2023-09-05 RX ORDER — NICOTINE 21 MG/24HR
1 PATCH, TRANSDERMAL 24 HOURS TRANSDERMAL EVERY 24 HOURS
Qty: 28 PATCH | Refills: 0 | Status: SHIPPED | OUTPATIENT
Start: 2023-09-05 | End: 2023-10-16 | Stop reason: SDUPTHER

## 2023-10-20 ENCOUNTER — HOSPITAL ENCOUNTER (OUTPATIENT)
Dept: LAB | Facility: MEDICAL CENTER | Age: 38
End: 2023-10-20
Payer: MEDICAID

## 2023-10-20 DIAGNOSIS — Z91.89 HAS MULTIPLE SEXUAL PARTNERS: ICD-10-CM

## 2023-10-20 DIAGNOSIS — R53.83 FATIGUE, UNSPECIFIED TYPE: ICD-10-CM

## 2023-10-20 LAB
25(OH)D3 SERPL-MCNC: 11 NG/ML (ref 30–100)
ALBUMIN SERPL BCP-MCNC: 4.2 G/DL (ref 3.2–4.9)
ALBUMIN/GLOB SERPL: 1.8 G/DL
ALP SERPL-CCNC: 77 U/L (ref 30–99)
ALT SERPL-CCNC: 11 U/L (ref 2–50)
ANION GAP SERPL CALC-SCNC: 9 MMOL/L (ref 7–16)
AST SERPL-CCNC: 17 U/L (ref 12–45)
BASOPHILS # BLD AUTO: 0.5 % (ref 0–1.8)
BASOPHILS # BLD: 0.05 K/UL (ref 0–0.12)
BILIRUB SERPL-MCNC: 0.4 MG/DL (ref 0.1–1.5)
BUN SERPL-MCNC: 13 MG/DL (ref 8–22)
CALCIUM ALBUM COR SERPL-MCNC: 8.9 MG/DL (ref 8.5–10.5)
CALCIUM SERPL-MCNC: 9.1 MG/DL (ref 8.5–10.5)
CHLORIDE SERPL-SCNC: 108 MMOL/L (ref 96–112)
CO2 SERPL-SCNC: 26 MMOL/L (ref 20–33)
CREAT SERPL-MCNC: 0.94 MG/DL (ref 0.5–1.4)
EOSINOPHIL # BLD AUTO: 0.13 K/UL (ref 0–0.51)
EOSINOPHIL NFR BLD: 1.4 % (ref 0–6.9)
ERYTHROCYTE [DISTWIDTH] IN BLOOD BY AUTOMATED COUNT: 42.3 FL (ref 35.9–50)
GFR SERPLBLD CREATININE-BSD FMLA CKD-EPI: 107 ML/MIN/1.73 M 2
GLOBULIN SER CALC-MCNC: 2.4 G/DL (ref 1.9–3.5)
GLUCOSE SERPL-MCNC: 97 MG/DL (ref 65–99)
HBV CORE AB SERPL QL IA: NONREACTIVE
HBV SURFACE AB SERPL IA-ACNC: 24.4 MIU/ML (ref 0–10)
HBV SURFACE AG SER QL: NORMAL
HCT VFR BLD AUTO: 47.8 % (ref 42–52)
HCV AB SER QL: NORMAL
HGB BLD-MCNC: 15.9 G/DL (ref 14–18)
HIV 1+2 AB+HIV1 P24 AG SERPL QL IA: NORMAL
IMM GRANULOCYTES # BLD AUTO: 0.04 K/UL (ref 0–0.11)
IMM GRANULOCYTES NFR BLD AUTO: 0.4 % (ref 0–0.9)
IRON SATN MFR SERPL: 39 % (ref 15–55)
IRON SERPL-MCNC: 109 UG/DL (ref 50–180)
LYMPHOCYTES # BLD AUTO: 2.5 K/UL (ref 1–4.8)
LYMPHOCYTES NFR BLD: 26.7 % (ref 22–41)
MCH RBC QN AUTO: 29.6 PG (ref 27–33)
MCHC RBC AUTO-ENTMCNC: 33.3 G/DL (ref 32.3–36.5)
MCV RBC AUTO: 89 FL (ref 81.4–97.8)
MONOCYTES # BLD AUTO: 0.47 K/UL (ref 0–0.85)
MONOCYTES NFR BLD AUTO: 5 % (ref 0–13.4)
NEUTROPHILS # BLD AUTO: 6.16 K/UL (ref 1.82–7.42)
NEUTROPHILS NFR BLD: 66 % (ref 44–72)
NRBC # BLD AUTO: 0 K/UL
NRBC BLD-RTO: 0 /100 WBC (ref 0–0.2)
PLATELET # BLD AUTO: 218 K/UL (ref 164–446)
PMV BLD AUTO: 10.9 FL (ref 9–12.9)
POTASSIUM SERPL-SCNC: 4.3 MMOL/L (ref 3.6–5.5)
PROT SERPL-MCNC: 6.6 G/DL (ref 6–8.2)
RBC # BLD AUTO: 5.37 M/UL (ref 4.7–6.1)
SODIUM SERPL-SCNC: 143 MMOL/L (ref 135–145)
T PALLIDUM AB SER QL IA: NORMAL
TIBC SERPL-MCNC: 279 UG/DL (ref 250–450)
TSH SERPL DL<=0.005 MIU/L-ACNC: 1.35 UIU/ML (ref 0.38–5.33)
UIBC SERPL-MCNC: 170 UG/DL (ref 110–370)
WBC # BLD AUTO: 9.4 K/UL (ref 4.8–10.8)

## 2023-10-20 PROCEDURE — 83550 IRON BINDING TEST: CPT

## 2023-10-20 PROCEDURE — 84443 ASSAY THYROID STIM HORMONE: CPT

## 2023-10-20 PROCEDURE — 87491 CHLMYD TRACH DNA AMP PROBE: CPT

## 2023-10-20 PROCEDURE — 80053 COMPREHEN METABOLIC PANEL: CPT

## 2023-10-20 PROCEDURE — 86803 HEPATITIS C AB TEST: CPT

## 2023-10-20 PROCEDURE — 87340 HEPATITIS B SURFACE AG IA: CPT

## 2023-10-20 PROCEDURE — 87591 N.GONORRHOEAE DNA AMP PROB: CPT

## 2023-10-20 PROCEDURE — 87389 HIV-1 AG W/HIV-1&-2 AB AG IA: CPT

## 2023-10-20 PROCEDURE — 85025 COMPLETE CBC W/AUTO DIFF WBC: CPT

## 2023-10-20 PROCEDURE — 86704 HEP B CORE ANTIBODY TOTAL: CPT

## 2023-10-20 PROCEDURE — 86706 HEP B SURFACE ANTIBODY: CPT

## 2023-10-20 PROCEDURE — 36415 COLL VENOUS BLD VENIPUNCTURE: CPT

## 2023-10-20 PROCEDURE — 86780 TREPONEMA PALLIDUM: CPT

## 2023-10-20 PROCEDURE — 82306 VITAMIN D 25 HYDROXY: CPT

## 2023-10-20 PROCEDURE — 87661 TRICHOMONAS VAGINALIS AMPLIF: CPT

## 2023-10-20 PROCEDURE — 83540 ASSAY OF IRON: CPT

## 2023-10-23 DIAGNOSIS — E55.9 VITAMIN D DEFICIENCY: ICD-10-CM

## 2023-10-23 LAB
SPEC CONTAINER SPEC: NORMAL
SPECIMEN SOURCE: NORMAL
T VAGINALIS RRNA SPEC QL NAA+PROBE: NEGATIVE

## 2023-10-23 RX ORDER — ERGOCALCIFEROL 1.25 MG/1
50000 CAPSULE ORAL
Qty: 5 CAPSULE | Refills: 1 | Status: SHIPPED | OUTPATIENT
Start: 2023-10-23 | End: 2023-11-09 | Stop reason: SDUPTHER

## 2023-11-09 ENCOUNTER — OFFICE VISIT (OUTPATIENT)
Dept: MEDICAL GROUP | Facility: CLINIC | Age: 38
End: 2023-11-09
Payer: MEDICAID

## 2023-11-09 VITALS
WEIGHT: 184 LBS | OXYGEN SATURATION: 96 % | HEART RATE: 78 BPM | TEMPERATURE: 97.6 F | SYSTOLIC BLOOD PRESSURE: 128 MMHG | DIASTOLIC BLOOD PRESSURE: 74 MMHG | BODY MASS INDEX: 27.25 KG/M2 | HEIGHT: 69 IN | RESPIRATION RATE: 14 BRPM

## 2023-11-09 DIAGNOSIS — Z72.0 TOBACCO ABUSE: ICD-10-CM

## 2023-11-09 DIAGNOSIS — E55.9 VITAMIN D DEFICIENCY: ICD-10-CM

## 2023-11-09 PROCEDURE — 3078F DIAST BP <80 MM HG: CPT

## 2023-11-09 PROCEDURE — 99203 OFFICE O/P NEW LOW 30 MIN: CPT | Mod: GE

## 2023-11-09 PROCEDURE — 3074F SYST BP LT 130 MM HG: CPT

## 2023-11-09 RX ORDER — ERGOCALCIFEROL 1.25 MG/1
50000 CAPSULE ORAL
Qty: 5 CAPSULE | Refills: 1 | Status: SHIPPED | OUTPATIENT
Start: 2023-11-09 | End: 2024-02-28 | Stop reason: SDUPTHER

## 2023-11-09 RX ORDER — NICOTINE 21 MG/24HR
1 PATCH, TRANSDERMAL 24 HOURS TRANSDERMAL EVERY 24 HOURS
Qty: 28 PATCH | Refills: 0 | Status: SHIPPED | OUTPATIENT
Start: 2023-11-09 | End: 2024-03-01 | Stop reason: SDUPTHER

## 2023-11-09 RX ORDER — NICOTINE 21 MG/24HR
1 PATCH, TRANSDERMAL 24 HOURS TRANSDERMAL EVERY 24 HOURS
COMMUNITY
End: 2023-11-09

## 2023-11-09 RX ORDER — ESCITALOPRAM OXALATE 20 MG/1
10-20 TABLET ORAL
Qty: 30 TABLET | Refills: 1 | Status: SHIPPED | OUTPATIENT
Start: 2023-11-09 | End: 2024-01-24

## 2023-11-09 ASSESSMENT — FIBROSIS 4 INDEX: FIB4 SCORE: 0.85

## 2023-11-09 NOTE — PROGRESS NOTES
"    SUBJECTIVE:     CC:  to establish care    HISTORY OF THE PRESENT ILLNESS:   Patient is a 38 y.o. male. This pleasant patient is here today to establish care and discuss lab results from 10/20/23 and requesting medications refills. His prior PCP was Dr. Smallwood, from the Internal medicine clinic on [address], who has since left the clinic.    Patient reports that blood work , ordered due to history of microscopic hematuria (from UA from 9/2022, 10/2022 and 12/2022) and concern for bladder cancer. Cystoscopy from 1/2023 was negative. CT body from 12/30/2022 showed negative urogram. Lab result from 10/2023 were unremarkable.    Tobacco use disorder   Patient smoked for 15 years, about 3/4 pack a day. He states he is trying, even though \"it is a daily struggle.\" Patient states he currently smokes 5 packs a week. He was started on Nicotine gum/patch about a year ago.    Medication refill requests for  Nicotine gum/patch  Escitalopram (VANESSA, Depression and OCD)  Vitamin D supplement for Vitamin D deficiency. Vitamin D level from 10/20/2023 was 11. 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 No pertinent surgical " "history.    Family History:  Family History   Problem Relation Age of Onset    No Known Problems Mother     Coronary artery disease Father     Stroke Father     Stroke Paternal Grandmother     Stroke Paternal Grandfather      Social History:  Social History     Tobacco Use    Smoking status: Every Day     Current packs/day: 0.50     Types: Cigarettes    Smokeless tobacco: Never    Tobacco comments:     3/4 of a pack a day- trying to quit   Vaping Use    Vaping Use: Former    Substances: Nicotine, Flavoring    Devices: Disposable   Substance Use Topics    Alcohol use: Not Currently    Drug use: No     Medications:  No current outpatient medications on file prior to visit.     No current facility-administered medications on file prior to visit.     Allergies   Allergen Reactions    Food      Other reaction(s): hives, breathing difficulties    Penicillins Hives       ROS:   Gen: no fevers/chills, no changes in weight  Eyes: no changes in vision  ENT: no changes in hearing  Pulm: no sob, no cough  CV: no chest pain, no palpitations  GI: no nausea/vomiting, no diarrhea  MSk: no myalgias  Skin: no rash  Neuro: no headaches, no numbness/tingling      OBJECTIVE:     Exam: /74 (BP Location: Right arm, Patient Position: Sitting, BP Cuff Size: Adult)   Pulse 78   Temp 36.4 °C (97.6 °F) (Temporal)   Resp 14   Ht 1.753 m (5' 9\")   Wt 83.5 kg (184 lb)   SpO2 96%  Body mass index is 27.17 kg/m².    General: Normal appearing. No distress.  HEENT: Normocephalic. Atraumatic.  Neck: Supple without JVD or bruit. Thyroid is not enlarged.  Pulmonary: Clear to ausculation.  Normal effort. No rales, ronchi, or wheezing.  Cardiovascular: Regular rate and rhythm without murmur. Carotid and radial pulses are intact and equal bilaterally.  Abdomen: Soft, nontender, nondistended. Normal bowel sounds.   Neurologic: Grossly nonfocal  Lymph: No cervical or supraclavicular lymph nodes are palpable  Skin: Warm and dry.  No obvious " lesions.  Musculoskeletal: Normal gait. No extremity cyanosis, clubbing, or edema.  Psych: Normal mood and affect. Alert and oriented x3. Judgment and insight is normal.      ASSESSMENT & PLAN:   38 y.o. male here to establish care, review lab results, which were unremarkable.    Problem List Items Addressed This Visit    Lab results from 10/2023 reviewed include CBC, CMP, TSH, STI screening results. All of which were normal.       Tobacco abuse  Patient smoked about 3/4 pack a day for 15 years. He currently smokes 5 packs a week. Discussed smoking cessation.     Relevant Medications    nicotine (NICODERM) 14 MG/24HR PATCH 24 HR (refilled)    nicotine polacrilex (NICORETTE) 2 MG Gum (refilled)     Other Visit Diagnoses       Vitamin D deficiency      Recent lab result from 10/2023 showed a vitamin D level of 11.0.  - Encourage more exposure to sunlight in the morning as possible.    Relevant Medications    vitamin D2, Ergocalciferol, (DRISDOL) 1.25 MG (67577 UT) Cap capsule (refilled)      Generalized Anxiety  Depression  OCD    Relevant Medications    escitalopram (LEXAPRO) 20 MG tablet (refilled)       Elie Terrazas, PGY-2  [facility]

## 2024-01-23 NOTE — TELEPHONE ENCOUNTER
Received request via: Pharmacy    Was the patient seen in the last year in this department? Yes    Does the patient have an active prescription (recently filled or refills available) for medication(s) requested? No    Pharmacy Name: [facility]/pharmacy #[id number] - [place], [place] - [address] in [place]      Does the patient have nursing home Plus and need 100 day supply (blood pressure, diabetes and cholesterol meds only)? Patient does not have SCP

## 2024-01-24 RX ORDER — ESCITALOPRAM OXALATE 20 MG/1
10-20 TABLET ORAL
Qty: 30 TABLET | Refills: 1 | Status: SHIPPED | OUTPATIENT
Start: 2024-01-24 | End: 2024-03-01 | Stop reason: SDUPTHER

## 2024-02-28 DIAGNOSIS — E55.9 VITAMIN D DEFICIENCY: ICD-10-CM

## 2024-02-28 RX ORDER — ERGOCALCIFEROL 1.25 MG/1
50000 CAPSULE ORAL
Qty: 5 CAPSULE | Refills: 1 | Status: SHIPPED | OUTPATIENT
Start: 2024-02-28 | End: 2024-03-18 | Stop reason: SDUPTHER

## 2024-02-29 NOTE — TELEPHONE ENCOUNTER
Received request via: Patient    Was the patient seen in the last year in this department? Yes    Does the patient have an active prescription (recently filled or refills available) for medication(s) requested? No    [facility]    Does the patient have FPC Plus and need 100 day supply (blood pressure, diabetes and cholesterol meds only)? Patient does not have SCP

## 2024-03-01 DIAGNOSIS — Z72.0 TOBACCO ABUSE: ICD-10-CM

## 2024-03-01 RX ORDER — ESCITALOPRAM OXALATE 20 MG/1
10-20 TABLET ORAL
Qty: 30 TABLET | Refills: 1 | Status: SHIPPED | OUTPATIENT
Start: 2024-03-01

## 2024-03-01 RX ORDER — NICOTINE 21 MG/24HR
1 PATCH, TRANSDERMAL 24 HOURS TRANSDERMAL EVERY 24 HOURS
Qty: 28 PATCH | Refills: 0 | Status: SHIPPED | OUTPATIENT
Start: 2024-03-01 | End: 2024-03-18

## 2024-03-01 NOTE — TELEPHONE ENCOUNTER
Received request via: Pharmacy    Was the patient seen in the last year in this department? YES    Does the patient have an active prescription (recently filled or refills available) for medication(s) requested? No    Pharmacy Name: [facility]    Does the patient have retirement Plus and need 100 day supply (blood pressure, diabetes and cholesterol meds only)? Patient does not have SCP

## 2024-03-18 ENCOUNTER — OFFICE VISIT (OUTPATIENT)
Dept: INTERNAL MEDICINE | Facility: OTHER | Age: 39
End: 2024-03-18
Payer: MEDICAID

## 2024-03-18 VITALS
BODY MASS INDEX: 26.45 KG/M2 | WEIGHT: 178.6 LBS | DIASTOLIC BLOOD PRESSURE: 72 MMHG | TEMPERATURE: 97.9 F | OXYGEN SATURATION: 98 % | SYSTOLIC BLOOD PRESSURE: 101 MMHG | HEIGHT: 69 IN | HEART RATE: 89 BPM

## 2024-03-18 DIAGNOSIS — E55.9 VITAMIN D DEFICIENCY: ICD-10-CM

## 2024-03-18 DIAGNOSIS — R05.8 POST-VIRAL COUGH SYNDROME: ICD-10-CM

## 2024-03-18 DIAGNOSIS — Z72.0 TOBACCO ABUSE: ICD-10-CM

## 2024-03-18 DIAGNOSIS — G47.39 SLEEP APNEA-LIKE BEHAVIOR: ICD-10-CM

## 2024-03-18 PROBLEM — N35.919 URETHRAL STRICTURE: Status: ACTIVE | Noted: 2024-01-21

## 2024-03-18 PROBLEM — Z00.00 ROUTINE ADULT HEALTH MAINTENANCE: Status: RESOLVED | Noted: 2022-08-03 | Resolved: 2024-03-18

## 2024-03-18 RX ORDER — BENZONATATE 100 MG/1
100 CAPSULE ORAL 3 TIMES DAILY PRN
Qty: 40 CAPSULE | Refills: 0 | Status: SHIPPED | OUTPATIENT
Start: 2024-03-18 | End: 2024-03-21

## 2024-03-18 RX ORDER — ERGOCALCIFEROL 1.25 MG/1
50000 CAPSULE ORAL
Qty: 5 CAPSULE | Refills: 1 | Status: SHIPPED | OUTPATIENT
Start: 2024-03-18

## 2024-03-18 RX ORDER — NICOTINE 21 MG/24HR
1 PATCH, TRANSDERMAL 24 HOURS TRANSDERMAL EVERY 24 HOURS
Qty: 30 PATCH | Refills: 1 | Status: SHIPPED | OUTPATIENT
Start: 2024-03-18

## 2024-03-18 ASSESSMENT — PATIENT HEALTH QUESTIONNAIRE - PHQ9
5. POOR APPETITE OR OVEREATING: 1 - SEVERAL DAYS
SUM OF ALL RESPONSES TO PHQ QUESTIONS 1-9: 13
CLINICAL INTERPRETATION OF PHQ2 SCORE: 4

## 2024-03-18 ASSESSMENT — ENCOUNTER SYMPTOMS
DIZZINESS: 0
NAUSEA: 1
HEADACHES: 0
VOMITING: 0
INSOMNIA: 1
NEUROLOGICAL NEGATIVE: 1
HEARTBURN: 0
SHORTNESS OF BREATH: 1
COUGH: 1
SPUTUM PRODUCTION: 1
WHEEZING: 1
HEMOPTYSIS: 0
ABDOMINAL PAIN: 0
EYES NEGATIVE: 1
DIARRHEA: 1
HALLUCINATIONS: 0
CARDIOVASCULAR NEGATIVE: 1
SINUS PAIN: 0

## 2024-03-18 ASSESSMENT — LIFESTYLE VARIABLES: SUBSTANCE_ABUSE: 0

## 2024-03-18 ASSESSMENT — FIBROSIS 4 INDEX: FIB4 SCORE: 0.87

## 2024-03-19 NOTE — PROGRESS NOTES
Teaching Physician Attestation      Level of Participation    I have personally interviewed and examined the patient.  In addition, I discussed with the resident physician the patient's history, exam, assessment and plan in detail.  Topics listed in my addendum were the focus of the visit.  Healthcare maintenance was not addressed this visit unless listed as a topic in my addendum.  I agree with the plan as written along with the following additions/modifications:    New Res Patient    PMH: OCD/PTSD followed by psychiatry, tobacco use, urethral stricture followed by urology, low vitamin D    Clarified the patient wishes to reestablish care and not follow with family medicine, appreciate family medicine support during transition to new PCP    Subacute cough and congestion likely secondary to resolving viral URI, suspect RSV versus COVID  -Acute onset of symptoms of viral URI approximately 1 month ago, peaked and began to improve within 1 week but has had persistence of congestion and cough.  Also fatigue, although has underlying sleep issue awaiting evaluation by sleep medicine.  On exam normal O2 sat, clear lungs.  Denies chest pain shortness of breath.  -Supportive counseling, given continued gradual improvement will monitor with supportive care including decongestants/[place] pot, call if plateaus or worsening symptoms.  Emphasized the connection of smoking to delayed resolution of symptoms.        Tobacco use  -Patient interested in quitting, will trial patches again, 1 pack/day so 21 mg daily patch, follow-up in 1 month      ? CRYSTAL/possible neuromuscular twitch, needing further evaluation  -sleep med referral re-placed, appreciate support    Encouraged psychiatry follow-up    Return to clinic in 1 month.  Needs follow-up on vitamin D repletion.  PCR.

## 2024-03-19 NOTE — PROGRESS NOTES
"    Established Patient    Patient Care Team:  Merle Martin M.D. as PCP - General (Internal Medicine)    HPI:  Jarvis Perez is a 39 y.o. male with relevant past medical history of tobacco use disorder, sleep apnea-like behavior, OCD, depression, VANESSA, possible PTSD who presents today to address lingering symptoms from a cold about a month ago.  His symptoms peaked about 3 days after getting a cold but he continues to have a hoarse voice, fatigue, productive cough, shortness of breath, and wheezing.  He did not perform COVID test because he is able to work at home and has several friends that have similar symptoms were negative for COVID.  He has had some looser stools and occasional nausea.  He also feels that he has to use the restroom more frequently although this is not a new symptom to the cold.  Remainder of review of systems as below.  He denies any fevers or chills.    He would also like to restart his nicotine patch prescription.  He recently \"relapsed on smoking\" and would like to start back on 21 mg.  He smokes about a pack a day.  He is hesitant to start any medications to help with his smoking cessation as he is concerned that this will affect his psych meds and mental health.    He was also referred to sleep medicine in the past but other health issues took precedence at the time.  He does have difficulty sleeping and feels very fatigued despite 8 or 9 hours of sleep.  He also occasionally wakes up gasping for air.  Also, when he is stressed about to fall asleep, he often is jolted awake and has abnormal sensation.      We clarified that although patient visited the family medicine office in November of last year, he will continue his primary care with us only moving forward.    Review of Systems   Constitutional:  Positive for malaise/fatigue.   HENT:  Positive for congestion and nosebleeds. Negative for sinus pain.    Eyes: Negative.    Respiratory:  Positive for cough, sputum production, " "shortness of breath and wheezing. Negative for hemoptysis.    Cardiovascular: Negative.    Gastrointestinal:  Positive for diarrhea and nausea. Negative for abdominal pain, heartburn, melena and vomiting.   Genitourinary:  Positive for frequency. Negative for dysuria, hematuria and urgency.   Neurological: Negative.  Negative for dizziness and headaches.   Psychiatric/Behavioral:  Negative for hallucinations and substance abuse. The patient has insomnia.    :    Past Medical History:   Diagnosis Date    Herpesvirus infection 12/13/2018    Psychiatric disorder     anxiety,  OCD     Social History     Tobacco Use    Smoking status: Every Day     Current packs/day: 0.50     Types: Cigarettes    Smokeless tobacco: Never    Tobacco comments:     3/4 of a pack a day- trying to quit   Vaping Use    Vaping Use: Former    Substances: Nicotine, Flavoring    Devices: Disposable   Substance Use Topics    Alcohol use: Not Currently    Drug use: No     Current Outpatient Medications   Medication Sig Dispense Refill    nicotine (NICODERM) 21 MG/24HR PATCH 24 HR Place 1 Patch on the skin every 24 hours. 30 Patch 1    vitamin D2, Ergocalciferol, (DRISDOL) 1.25 MG (62618 UT) Cap capsule Take 1 Capsule by mouth every 7 days. 5 Capsule 1    benzonatate (TESSALON) 100 MG Cap Take 1 Capsule by mouth 3 times a day as needed for Cough. 40 Capsule 0    escitalopram (LEXAPRO) 20 MG tablet Take 0.5-1 Tablets by mouth every day. 30 Tablet 1     No current facility-administered medications for this visit.       Physical Exam:  /72 (BP Location: Right arm, Patient Position: Sitting, BP Cuff Size: Adult)   Pulse 89   Temp 36.6 °C (97.9 °F) (Temporal)   Ht 1.753 m (5' 9\")   Wt 81 kg (178 lb 9.6 oz)   SpO2 98%   BMI 26.37 kg/m²   Physical Exam  Vitals reviewed.   Constitutional:       General: He is not in acute distress.     Appearance: Normal appearance. He is normal weight. He is not ill-appearing or toxic-appearing.   HENT:      " Head: Normocephalic and atraumatic.      Right Ear: Tympanic membrane, ear canal and external ear normal.      Left Ear: Tympanic membrane, ear canal and external ear normal.      Mouth/Throat:      Pharynx: No posterior oropharyngeal erythema.   Cardiovascular:      Rate and Rhythm: Normal rate and regular rhythm.      Pulses: Normal pulses.      Heart sounds: Normal heart sounds.   Pulmonary:      Effort: Pulmonary effort is normal.      Breath sounds: Normal breath sounds. No wheezing.   Abdominal:      General: Abdomen is flat. Bowel sounds are normal.      Palpations: Abdomen is soft.      Tenderness: There is no abdominal tenderness.   Musculoskeletal:         General: No swelling or tenderness. Normal range of motion.      Cervical back: Normal range of motion and neck supple. No tenderness.   Lymphadenopathy:      Cervical: No cervical adenopathy.   Skin:     General: Skin is warm and dry.      Findings: No bruising or rash.   Neurological:      General: No focal deficit present.      Mental Status: He is alert and oriented to person, place, and time.      Sensory: No sensory deficit.      Motor: No weakness.   Psychiatric:         Mood and Affect: Mood is anxious.         Speech: Speech normal.         Behavior: Behavior is withdrawn. Behavior is cooperative.         Thought Content: Thought content normal.         Cognition and Memory: Cognition and memory normal.         Judgment: Judgment normal.         Assessment and Plan:     # Flulike symptoms  #Viral upper respiratory infection  #Subacute cough  Given patient's symptoms and extended recovery time suspect that he had RSV or possible COVID.  Explained that RSV recovery is longer than your typical cold.  Physical exam negative for added breath sounds.  However, he is not in any respiratory distress and does not qualify for antibiotics, steroids, or an inhaler.  We discussed supportive management including antihistamine, Flonase, cough suppressant, and  the importance of tobacco cessation when it comes to respiratory infections.  - Recommend over-the-counter antihistamine such as Allegra or Zyrtec  - Tessalon for cough suppression 3 times daily as needed  - Discussed symptoms that would warrant ED visit    #Likely sleep apnea  STOP BANG 3 points.  Unsatisfactory sleep and gasping awake consistent with sleep apnea.  He also has frequent sensation of jolting awake just as he is about to fall asleep.  This is paired with unusual sensations that may be better addressed by sleep medicine.  - Referral to sleep medicine    #Tobacco use disorder  Patient has tried to quit cigarettes in the past.  He recently restarted smoking (1 pack per day) and he would like to get back on the higher dose of nicotine patch.  We discussed possible pharmaceutical treatment for cravings but patient declined at this time due to concern for how to affect his mental health and his escitalopram despite my reassurance that they are not known to have any significant interaction with SSRIs.  - Nicotine patch 21 mg  - Readdress at next appointment    #OCD  #VANESSA  #Depression  Patient reports having been diagnosed by psychiatry for these disorders.  Currently, he is taking escitalopram 20 mg daily.  We briefly touched on importance of maintaining relationship with a psychiatrist in therapist, however, patient feels that he is on the right medication and does not need any further assistance.  His PHQ-9 was 13 but especially significant for elevated risk for self-harm.  He states that his OCD contributes to this urge however, he has never acted upon it and does not feel that that is severe enough to be an issue.  We reviewed resources for suicide or self-harm prevention including hotlines, reaching out to family and friends, and strict ED precautions.  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  He was recently evaluated for bladder cancer given unusual UA findings.  Urology assessed and determined that his microscopic hematuria is likely secondary to urethral stricture.  He was supposed to return to have formal evaluation of his urinary flow, however, he felt that this was not necessary as he can \"deal with the symptoms.\"  We briefly discussed that any abnormal flow can lead to increased risk for infection and we will address this issue at future appointments.    Patient is behind on several vaccinations.  Will need to address at follow-up appointments.      Return in about 5 weeks (around 4/22/2024).      Merle Martin M.D., PGY-1 Internal Medicine  [facility]    This note was created using voice recognition software.  While every attempt is made to ensure accuracy of transcription, occasionally errors occur.  "

## 2024-04-20 ENCOUNTER — PATIENT MESSAGE (OUTPATIENT)
Dept: INTERNAL MEDICINE | Facility: OTHER | Age: 39
End: 2024-04-20
Payer: MEDICAID

## 2024-04-20 DIAGNOSIS — Z72.0 TOBACCO ABUSE: ICD-10-CM

## 2024-04-22 NOTE — PATIENT COMMUNICATION
Received request via: Patient    Was the patient seen in the last year in this department? Yes    Does the patient have an active prescription (recently filled or refills available) for medication(s) requested? No    Pharmacy Name: [facility]    Does the patient have prison Plus and need 100 day supply (blood pressure, diabetes and cholesterol meds only)? Patient does not have SCP

## 2024-05-07 ENCOUNTER — OFFICE VISIT (OUTPATIENT)
Dept: INTERNAL MEDICINE | Facility: OTHER | Age: 39
End: 2024-05-07
Payer: MEDICAID

## 2024-05-07 VITALS
DIASTOLIC BLOOD PRESSURE: 81 MMHG | HEART RATE: 87 BPM | SYSTOLIC BLOOD PRESSURE: 119 MMHG | WEIGHT: 182 LBS | TEMPERATURE: 98.6 F | HEIGHT: 69 IN | BODY MASS INDEX: 26.96 KG/M2 | OXYGEN SATURATION: 98 %

## 2024-05-07 DIAGNOSIS — R68.89 LIGHT SENSITIVITY: ICD-10-CM

## 2024-05-07 DIAGNOSIS — F51.8 HYPNAGOGIC JERKS: ICD-10-CM

## 2024-05-07 DIAGNOSIS — Z11.3 SCREENING FOR STDS (SEXUALLY TRANSMITTED DISEASES): ICD-10-CM

## 2024-05-07 PROCEDURE — 3079F DIAST BP 80-89 MM HG: CPT

## 2024-05-07 PROCEDURE — 3074F SYST BP LT 130 MM HG: CPT

## 2024-05-07 PROCEDURE — 99213 OFFICE O/P EST LOW 20 MIN: CPT | Mod: GE

## 2024-05-07 ASSESSMENT — FIBROSIS 4 INDEX: FIB4 SCORE: 0.87

## 2024-05-07 NOTE — PROGRESS NOTES
.    ESTABLISHED PATIENT VISIT    PATIENT ID:  Name: Jarvis Perez  YOB: 1985  Patient Care Team:  Merle Martin M.D. as PCP - General (Internal Medicine)    CHIEF COMPLAINT(s):  Headache (Headaches and light sensitivity )  Follow up for Diagnoses of Screening for STDs (sexually transmitted diseases), Light sensitivity, and Hypnagogic jerks were pertinent to this visit.    History of Present Illness:    This is a pleasant 39 y.o. male clinic patient established with my colleague Dr. Martin who presents today for evaluation of photophobia and hypnagogic movements.      Review of Systems   Constitutional:  Negative for chills and fever.   HENT:  Negative for congestion.    Eyes:  Positive for photophobia.   Respiratory:  Negative for shortness of breath and wheezing.    Cardiovascular:  Negative for chest pain.   Gastrointestinal:  Negative for abdominal pain and vomiting.   Musculoskeletal:  Negative for falls.   Neurological:  Negative for weakness.       Past Medical History:   Diagnosis Date    Herpesvirus infection 12/13/2018    Psychiatric disorder     anxiety,  OCD     No past surgical history on file.  Family History   Problem Relation Age of Onset    No Known Problems Mother     Coronary artery disease Father     Stroke Father     Stroke Paternal Grandmother     Stroke Paternal Grandfather      Food and Penicillins  Social History     Tobacco Use    Smoking status: Some Days     Current packs/day: 0.50     Types: Cigarettes    Smokeless tobacco: Never    Tobacco comments:     3/4 of a pack a day- trying to quit     5/7/24 - 1 cigarette weekly    Vaping Use    Vaping Use: Former    Substances: Nicotine, Flavoring    Devices: Disposable   Substance Use Topics    Alcohol use: Not Currently    Drug use: No     Current Outpatient Medications   Medication Sig Dispense Refill    nicotine polacrilex (NICORETTE) 2 MG Gum Take 1 Each by mouth 4 times a day as needed for Smoking Cessation (for  "breakthrough cravings). 150 Each 0    nicotine (NICODERM) 21 MG/24HR PATCH 24 HR Place 1 Patch on the skin every 24 hours. 30 Patch 1    vitamin D2, Ergocalciferol, (DRISDOL) 1.25 MG (38503 UT) Cap capsule Take 1 Capsule by mouth every 7 days. 5 Capsule 1    escitalopram (LEXAPRO) 20 MG tablet Take 0.5-1 Tablets by mouth every day. 30 Tablet 1     No current facility-administered medications for this visit.       OBJECTIVE:  /81 (BP Location: Right arm, Patient Position: Sitting, BP Cuff Size: Adult)   Pulse 87   Temp 37 °C (98.6 °F) (Temporal)   Ht 1.753 m (5' 9\")   Wt 82.6 kg (182 lb)   SpO2 98%   BMI 26.88 kg/m²   Physical Exam  Vitals reviewed.   Constitutional:       General: He is not in acute distress.     Appearance: Normal appearance. He is normal weight. He is not ill-appearing or toxic-appearing.   HENT:      Head: Normocephalic.      Nose: Nose normal.      Mouth/Throat:      Pharynx: Oropharynx is clear.   Eyes:      Conjunctiva/sclera: Conjunctivae normal.   Cardiovascular:      Rate and Rhythm: Normal rate.   Pulmonary:      Effort: Pulmonary effort is normal. No respiratory distress.      Breath sounds: No wheezing.   Musculoskeletal:      Cervical back: Normal range of motion.      Right lower leg: No edema.      Left lower leg: No edema.   Skin:     General: Skin is warm and dry.      Coloration: Skin is not jaundiced or pale.   Neurological:      General: No focal deficit present.      Mental Status: He is alert and oriented to person, place, and time.      Gait: Gait normal.   Psychiatric:         Mood and Affect: Mood normal.         Behavior: Behavior normal.         ASSESSMENT AND PLAN:     Problem List Items Addressed This Visit       Hypnagogic jerks     Patient s/p recent sleep medicine appointment which was recommended to him by pcp for sleep-apnea like behavior and hypnagogic jerks/movements. Patient shares with me that although he recognizes that he might have sleep apnea and " has been making efforts to make the changes and undergo the sleep study recommended to him by the sleep med doctor; he did feel a bit cornered into the sleep apnea diagnosis without enough discussion about his concern for the hypnagogic movements that bother him most.  - sleep study pending (waiting on insurance approval)  - continue limiting screen time and caffeine too close to sleep  - recommend continuing to follow up with sleep medicine --> per chart review, it appears sleep med wants to also work patient up for RLS and iron deficiency (although iron studies wnl from 10/2023)  - neurology referral (more for light sensitivity/head pressure concerns as noted below, but they might also be able to provide an insight into these hypnagogic movements that are bothersome to patient)  - at future visits, pcp could also consider vit D recheck (last check 11, patient now on supplementation) and b12. Recent tsh wnl         Light sensitivity     Photophobia worsening over the past few weeks. Particularly bothersome to patient during hypnagogia. Patient reports that are occasionally associated with headaches as well as abnormal pressure sensations behind eyes. Denies personal or family history of migraines, htn. Has established with sleep medicine as noted above, still pending sleep study. Last eye exam either 2022 or 2023 was normal and no other eye symptoms that patient has noticed. Neuro exam non-focal and patient denies weakness and incontinence. Patient has tried ibuprofen once but is unsure if it helped or not.  - exact etiology unclear, possibly atypical migraine presentation? vs an association with patient's hypnagogic movements?  - optometry and neurology referrals         Relevant Orders    Referral to Neurology    Referral to Optometry     Other Visit Diagnoses       Screening for STDs (sexually transmitted diseases)        Relevant Orders    [facility] Gonorrhea    [facility] ([facility])    HIV AG/AB COMBO ASSAY  SCREENING            Orders Placed This Encounter    [facility] Gonorrhea    [facility] ([facility])    HIV AG/AB COMBO ASSAY SCREENING    Referral to Neurology    Referral to Optometry       Return in about 4 weeks (around 6/4/2024).      Shantel Salamanca M.D.  [facility] Internal Medicine Resident

## 2024-05-08 PROBLEM — F51.8 HYPNAGOGIC JERKS: Status: ACTIVE | Noted: 2024-05-08

## 2024-05-08 PROBLEM — G47.9 SLEEP DISTURBANCES: Status: ACTIVE | Noted: 2024-05-08

## 2024-05-08 PROBLEM — R68.89 LIGHT SENSITIVITY: Status: ACTIVE | Noted: 2024-05-08

## 2024-05-08 ASSESSMENT — ENCOUNTER SYMPTOMS
ABDOMINAL PAIN: 0
FEVER: 0
FALLS: 0
VOMITING: 0
PHOTOPHOBIA: 1
SHORTNESS OF BREATH: 0
WEAKNESS: 0
CHILLS: 0
WHEEZING: 0

## 2024-05-08 NOTE — ASSESSMENT & PLAN NOTE
Patient s/p recent sleep medicine appointment which was recommended to him by pcp for sleep-apnea like behavior and hypnagogic jerks/movements. Patient shares with me that although he recognizes that he might have sleep apnea and has been making efforts to make the changes and undergo the sleep study recommended to him by the sleep med doctor; he did feel a bit cornered into the sleep apnea diagnosis without enough discussion about his concern for the hypnagogic movements that bother him most.  - sleep study pending (waiting on insurance approval)  - continue limiting screen time and caffeine too close to sleep  - recommend continuing to follow up with sleep medicine --> per chart review, it appears sleep med wants to also work patient up for RLS and iron deficiency (although iron studies wnl from 10/2023)  - neurology referral (more for light sensitivity/head pressure concerns as noted below, but they might also be able to provide an insight into these hypnagogic movements that are bothersome to patient)  - at future visits, pcp could also consider vit D recheck (last check 11, patient now on supplementation) and b12. Recent tsh wnl

## 2024-05-08 NOTE — ASSESSMENT & PLAN NOTE
Photophobia worsening over the past few weeks. Particularly bothersome to patient during hypnagogia. Patient reports that are occasionally associated with headaches as well as abnormal pressure sensations behind eyes. Denies personal or family history of migraines, htn. Has established with sleep medicine as noted above, still pending sleep study. Last eye exam either 2022 or 2023 was normal and no other eye symptoms that patient has noticed. Neuro exam non-focal and patient denies weakness and incontinence. Patient has tried ibuprofen once but is unsure if it helped or not.  - exact etiology unclear, possibly atypical migraine presentation? vs an association with patient's hypnagogic movements?  - optometry and neurology referrals

## 2024-07-23 RX ORDER — ESCITALOPRAM OXALATE 20 MG/1
10-20 TABLET ORAL
Qty: 30 TABLET | Refills: 1 | Status: SHIPPED | OUTPATIENT
Start: 2024-07-23

## 2024-07-30 ENCOUNTER — OFFICE VISIT (OUTPATIENT)
Dept: INTERNAL MEDICINE | Facility: OTHER | Age: 39
End: 2024-07-30
Payer: MEDICAID

## 2024-07-30 VITALS
WEIGHT: 185 LBS | HEART RATE: 85 BPM | HEIGHT: 69 IN | DIASTOLIC BLOOD PRESSURE: 78 MMHG | SYSTOLIC BLOOD PRESSURE: 113 MMHG | BODY MASS INDEX: 27.4 KG/M2 | TEMPERATURE: 99 F | OXYGEN SATURATION: 96 %

## 2024-07-30 DIAGNOSIS — E55.9 VITAMIN D DEFICIENCY: ICD-10-CM

## 2024-07-30 DIAGNOSIS — R53.82 CHRONIC FATIGUE: ICD-10-CM

## 2024-07-30 DIAGNOSIS — R31.29 MICROSCOPIC HEMATURIA: ICD-10-CM

## 2024-07-30 DIAGNOSIS — Z72.0 TOBACCO ABUSE: ICD-10-CM

## 2024-07-30 DIAGNOSIS — Z13.0 SCREENING FOR DEFICIENCY ANEMIA: ICD-10-CM

## 2024-07-30 DIAGNOSIS — Z13.21 ENCOUNTER FOR VITAMIN DEFICIENCY SCREENING: ICD-10-CM

## 2024-07-30 RX ORDER — NICOTINE 21 MG/24HR
1 PATCH, TRANSDERMAL 24 HOURS TRANSDERMAL EVERY 24 HOURS
Qty: 30 PATCH | Refills: 1 | Status: SHIPPED | OUTPATIENT
Start: 2024-07-30

## 2024-07-30 RX ORDER — ERGOCALCIFEROL 1.25 MG/1
50000 CAPSULE ORAL
Qty: 5 CAPSULE | Refills: 1 | Status: SHIPPED | OUTPATIENT
Start: 2024-07-30

## 2024-07-30 ASSESSMENT — FIBROSIS 4 INDEX: FIB4 SCORE: 0.87

## 2024-07-31 PROBLEM — E55.9 VITAMIN D DEFICIENCY: Status: ACTIVE | Noted: 2024-07-31

## 2024-07-31 PROBLEM — R53.82 CHRONIC FATIGUE: Status: ACTIVE | Noted: 2024-07-31

## 2024-07-31 ASSESSMENT — ENCOUNTER SYMPTOMS
ABDOMINAL PAIN: 0
FALLS: 0
VOMITING: 0
WEAKNESS: 0
FEVER: 0
WHEEZING: 0
CHILLS: 0
SHORTNESS OF BREATH: 0

## 2024-08-10 ENCOUNTER — HOSPITAL ENCOUNTER (OUTPATIENT)
Dept: LAB | Facility: MEDICAL CENTER | Age: 39
End: 2024-08-10
Payer: MEDICAID

## 2024-08-10 DIAGNOSIS — R31.29 MICROSCOPIC HEMATURIA: ICD-10-CM

## 2024-08-10 DIAGNOSIS — Z13.21 ENCOUNTER FOR VITAMIN DEFICIENCY SCREENING: ICD-10-CM

## 2024-08-10 DIAGNOSIS — Z13.0 SCREENING FOR DEFICIENCY ANEMIA: ICD-10-CM

## 2024-08-10 DIAGNOSIS — Z11.3 SCREENING FOR STDS (SEXUALLY TRANSMITTED DISEASES): ICD-10-CM

## 2024-08-10 LAB
25(OH)D3 SERPL-MCNC: 24 NG/ML (ref 30–100)
APPEARANCE UR: CLEAR
BACTERIA #/AREA URNS HPF: NEGATIVE /HPF
BILIRUB UR QL STRIP.AUTO: NEGATIVE
COLOR UR: YELLOW
EPI CELLS #/AREA URNS HPF: NEGATIVE /HPF
ERYTHROCYTE [DISTWIDTH] IN BLOOD BY AUTOMATED COUNT: 41.8 FL (ref 35.9–50)
GLUCOSE UR STRIP.AUTO-MCNC: NEGATIVE MG/DL
HCT VFR BLD AUTO: 48.5 % (ref 42–52)
HGB BLD-MCNC: 16.1 G/DL (ref 14–18)
HIV 1+2 AB+HIV1 P24 AG SERPL QL IA: NORMAL
HYALINE CASTS #/AREA URNS LPF: ABNORMAL /LPF
KETONES UR STRIP.AUTO-MCNC: NEGATIVE MG/DL
LEUKOCYTE ESTERASE UR QL STRIP.AUTO: NEGATIVE
MCH RBC QN AUTO: 29.9 PG (ref 27–33)
MCHC RBC AUTO-ENTMCNC: 33.2 G/DL (ref 32.3–36.5)
MCV RBC AUTO: 90.1 FL (ref 81.4–97.8)
MICRO URNS: ABNORMAL
NITRITE UR QL STRIP.AUTO: NEGATIVE
PH UR STRIP.AUTO: 5.5 [PH] (ref 5–8)
PLATELET # BLD AUTO: 215 K/UL (ref 164–446)
PMV BLD AUTO: 11.3 FL (ref 9–12.9)
PROT UR QL STRIP: NEGATIVE MG/DL
RBC # BLD AUTO: 5.38 M/UL (ref 4.7–6.1)
RBC # URNS HPF: ABNORMAL /HPF
RBC UR QL AUTO: ABNORMAL
SP GR UR STRIP.AUTO: 1.02
T PALLIDUM AB SER QL IA: NORMAL
UROBILINOGEN UR STRIP.AUTO-MCNC: 0.2 MG/DL
WBC # BLD AUTO: 8.6 K/UL (ref 4.8–10.8)
WBC #/AREA URNS HPF: ABNORMAL /HPF

## 2024-08-10 PROCEDURE — 82306 VITAMIN D 25 HYDROXY: CPT

## 2024-08-10 PROCEDURE — 87389 HIV-1 AG W/HIV-1&-2 AB AG IA: CPT

## 2024-08-10 PROCEDURE — 36415 COLL VENOUS BLD VENIPUNCTURE: CPT

## 2024-08-10 PROCEDURE — 81001 URINALYSIS AUTO W/SCOPE: CPT

## 2024-08-10 PROCEDURE — 86780 TREPONEMA PALLIDUM: CPT

## 2024-08-10 PROCEDURE — 85027 COMPLETE CBC AUTOMATED: CPT

## 2024-08-10 PROCEDURE — 87591 N.GONORRHOEAE DNA AMP PROB: CPT

## 2024-08-10 PROCEDURE — 87491 CHLMYD TRACH DNA AMP PROBE: CPT

## 2024-10-16 ENCOUNTER — OFFICE VISIT (OUTPATIENT)
Dept: INTERNAL MEDICINE | Facility: OTHER | Age: 39
End: 2024-10-16
Payer: MEDICAID

## 2024-10-16 VITALS
HEIGHT: 69 IN | DIASTOLIC BLOOD PRESSURE: 72 MMHG | TEMPERATURE: 98.1 F | BODY MASS INDEX: 27.49 KG/M2 | OXYGEN SATURATION: 97 % | WEIGHT: 185.6 LBS | HEART RATE: 73 BPM | SYSTOLIC BLOOD PRESSURE: 103 MMHG

## 2024-10-16 DIAGNOSIS — R51.9 HEADACHE DISORDER: Primary | ICD-10-CM

## 2024-10-16 DIAGNOSIS — E55.9 VITAMIN D DEFICIENCY: ICD-10-CM

## 2024-10-16 DIAGNOSIS — Z72.0 TOBACCO ABUSE: ICD-10-CM

## 2024-10-16 DIAGNOSIS — R82.89: ICD-10-CM

## 2024-10-16 DIAGNOSIS — F32.A DEPRESSION, UNSPECIFIED DEPRESSION TYPE: ICD-10-CM

## 2024-10-16 DIAGNOSIS — R53.83 OTHER FATIGUE: ICD-10-CM

## 2024-10-16 DIAGNOSIS — F42.9 OBSESSIVE-COMPULSIVE DISORDER, UNSPECIFIED TYPE: ICD-10-CM

## 2024-10-16 DIAGNOSIS — G47.00 INSOMNIA, UNSPECIFIED TYPE: ICD-10-CM

## 2024-10-16 DIAGNOSIS — G47.33 OSA (OBSTRUCTIVE SLEEP APNEA): ICD-10-CM

## 2024-10-16 DIAGNOSIS — R31.29 MICROSCOPIC HEMATURIA: ICD-10-CM

## 2024-10-16 PROCEDURE — 99214 OFFICE O/P EST MOD 30 MIN: CPT | Mod: GC

## 2024-10-16 ASSESSMENT — ENCOUNTER SYMPTOMS
SORE THROAT: 0
HEADACHES: 1
WEIGHT LOSS: 0
CHILLS: 0
ABDOMINAL PAIN: 0
CONSTIPATION: 0
BACK PAIN: 0
DIARRHEA: 0
NERVOUS/ANXIOUS: 0
DOUBLE VISION: 0
COUGH: 0
FEVER: 0
SHORTNESS OF BREATH: 0
WEAKNESS: 0
PALPITATIONS: 0
SPUTUM PRODUCTION: 0
VOMITING: 0
DIZZINESS: 1
MYALGIAS: 0
NAUSEA: 1
BLURRED VISION: 0
INSOMNIA: 1

## 2024-10-16 ASSESSMENT — FIBROSIS 4 INDEX: FIB4 SCORE: 0.88

## 2024-10-16 ASSESSMENT — LIFESTYLE VARIABLES: SUBSTANCE_ABUSE: 0

## 2024-10-31 ENCOUNTER — HOSPITAL ENCOUNTER (OUTPATIENT)
Dept: LAB | Facility: MEDICAL CENTER | Age: 39
End: 2024-10-31
Attending: UROLOGY
Payer: MEDICAID

## 2024-10-31 ENCOUNTER — HOSPITAL ENCOUNTER (OUTPATIENT)
Dept: LAB | Facility: MEDICAL CENTER | Age: 39
End: 2024-10-31
Payer: MEDICAID

## 2024-10-31 DIAGNOSIS — R51.9 HEADACHE DISORDER: ICD-10-CM

## 2024-10-31 LAB
FOLATE SERPL-MCNC: 13.4 NG/ML
T4 FREE SERPL-MCNC: 0.96 NG/DL (ref 0.93–1.7)
TSH SERPL-ACNC: 1.36 UIU/ML (ref 0.35–5.5)

## 2024-10-31 PROCEDURE — 84439 ASSAY OF FREE THYROXINE: CPT

## 2024-10-31 PROCEDURE — 84403 ASSAY OF TOTAL TESTOSTERONE: CPT

## 2024-10-31 PROCEDURE — 36415 COLL VENOUS BLD VENIPUNCTURE: CPT

## 2024-10-31 PROCEDURE — 83735 ASSAY OF MAGNESIUM: CPT

## 2024-10-31 PROCEDURE — 84270 ASSAY OF SEX HORMONE GLOBUL: CPT

## 2024-10-31 PROCEDURE — 84402 ASSAY OF FREE TESTOSTERONE: CPT

## 2024-10-31 PROCEDURE — 80053 COMPREHEN METABOLIC PANEL: CPT

## 2024-10-31 PROCEDURE — 84443 ASSAY THYROID STIM HORMONE: CPT

## 2024-10-31 PROCEDURE — 82607 VITAMIN B-12: CPT

## 2024-10-31 PROCEDURE — 82746 ASSAY OF FOLIC ACID SERUM: CPT

## 2024-11-01 LAB
ALBUMIN SERPL BCP-MCNC: 4.3 G/DL (ref 3.2–4.9)
ALBUMIN/GLOB SERPL: 1.7 G/DL
ALP SERPL-CCNC: 93 U/L (ref 30–99)
ALT SERPL-CCNC: 19 U/L (ref 2–50)
ANION GAP SERPL CALC-SCNC: 7 MMOL/L (ref 7–16)
AST SERPL-CCNC: 26 U/L (ref 12–45)
BILIRUB SERPL-MCNC: 0.5 MG/DL (ref 0.1–1.5)
BUN SERPL-MCNC: 13 MG/DL (ref 8–22)
CALCIUM ALBUM COR SERPL-MCNC: 9.4 MG/DL (ref 8.5–10.5)
CALCIUM SERPL-MCNC: 9.6 MG/DL (ref 8.5–10.5)
CHLORIDE SERPL-SCNC: 105 MMOL/L (ref 96–112)
CO2 SERPL-SCNC: 27 MMOL/L (ref 20–33)
CREAT SERPL-MCNC: 1.02 MG/DL (ref 0.5–1.4)
GFR SERPLBLD CREATININE-BSD FMLA CKD-EPI: 97 ML/MIN/1.73 M 2
GLOBULIN SER CALC-MCNC: 2.5 G/DL (ref 1.9–3.5)
GLUCOSE SERPL-MCNC: 95 MG/DL (ref 65–99)
MAGNESIUM SERPL-MCNC: 2 MG/DL (ref 1.5–2.5)
POTASSIUM SERPL-SCNC: 4.7 MMOL/L (ref 3.6–5.5)
PROT SERPL-MCNC: 6.8 G/DL (ref 6–8.2)
SODIUM SERPL-SCNC: 139 MMOL/L (ref 135–145)
VIT B12 SERPL-MCNC: 669 PG/ML (ref 211–911)

## 2024-11-02 ENCOUNTER — HOSPITAL ENCOUNTER (OUTPATIENT)
Dept: RADIOLOGY | Facility: MEDICAL CENTER | Age: 39
End: 2024-11-02
Payer: MEDICAID

## 2024-11-02 DIAGNOSIS — R51.9 HEADACHE DISORDER: ICD-10-CM

## 2024-11-02 LAB
SHBG SERPL-SCNC: 58 NMOL/L (ref 17–56)
TESTOST FREE MFR SERPL: 1.4 % (ref 1.6–2.9)
TESTOST FREE SERPL-MCNC: 88 PG/ML (ref 47–244)
TESTOST SERPL-MCNC: 634 NG/DL (ref 300–1080)

## 2024-11-02 PROCEDURE — 70551 MRI BRAIN STEM W/O DYE: CPT

## 2024-11-04 ENCOUNTER — OFFICE VISIT (OUTPATIENT)
Dept: INTERNAL MEDICINE | Facility: OTHER | Age: 39
End: 2024-11-04
Payer: MEDICAID

## 2024-11-04 VITALS
DIASTOLIC BLOOD PRESSURE: 81 MMHG | WEIGHT: 185.4 LBS | HEART RATE: 83 BPM | BODY MASS INDEX: 28.1 KG/M2 | HEIGHT: 68 IN | TEMPERATURE: 98.4 F | OXYGEN SATURATION: 97 % | SYSTOLIC BLOOD PRESSURE: 114 MMHG

## 2024-11-04 DIAGNOSIS — F42.9 OBSESSIVE-COMPULSIVE DISORDER, UNSPECIFIED TYPE: ICD-10-CM

## 2024-11-04 DIAGNOSIS — R11.0 NAUSEA: ICD-10-CM

## 2024-11-04 DIAGNOSIS — R53.83 OTHER FATIGUE: ICD-10-CM

## 2024-11-04 DIAGNOSIS — K92.1 BLOODY STOOL: ICD-10-CM

## 2024-11-04 DIAGNOSIS — F41.1 GAD (GENERALIZED ANXIETY DISORDER): ICD-10-CM

## 2024-11-04 PROCEDURE — 3079F DIAST BP 80-89 MM HG: CPT | Mod: GC

## 2024-11-04 PROCEDURE — 99214 OFFICE O/P EST MOD 30 MIN: CPT | Mod: GC

## 2024-11-04 PROCEDURE — 3074F SYST BP LT 130 MM HG: CPT | Mod: GC

## 2024-11-04 ASSESSMENT — ENCOUNTER SYMPTOMS
PALPITATIONS: 0
SHORTNESS OF BREATH: 0
NAUSEA: 1
MYALGIAS: 0
CHILLS: 0
DIZZINESS: 0
COUGH: 0
SORE THROAT: 0
FEVER: 0
VOMITING: 0
ABDOMINAL PAIN: 0
HEADACHES: 0
BACK PAIN: 0

## 2024-11-04 ASSESSMENT — FIBROSIS 4 INDEX: FIB4 SCORE: 1.03

## 2024-11-04 NOTE — PROGRESS NOTES
Chief Complaint: follow up on nausea, bloody stools, and lab work    Last Seen: 10/16/2024    History of Present Illness:   Jarvis Perez is a 39 y.o. male who presents with follow up for nausea and bloody stools. Patient states that he has been having nausea for the past couple of months.  It occurs right after eating and is not associated with any certain foods.  It last for about 15 minutes and then self resolves.  He denies any abdominal pain or vomiting during these episodes.  He states that his diet consists of steak, chicken, salads, tacos, burgers, fruit, and V8 drinks.  He was also having loose stools with some blood in them.  He states that it has resolved and his stools are now lemuel colored.  They are now pellety with craters in them.    Patient got CMP with mag, B12, TSH, folate, MRI brain at last visit and results were discussed with patient.    Past Medical History:   Diagnosis Date    Herpesvirus infection 12/13/2018    Psychiatric disorder     anxiety,  OCD       No past surgical history on file.    Family History   Problem Relation Age of Onset    No Known Problems Mother     Coronary artery disease Father     Stroke Father     Stroke Paternal Grandmother     Stroke Paternal Grandfather        Social History     Socioeconomic History    Marital status: Single     Spouse name: Not on file    Number of children: Not on file    Years of education: Not on file    Highest education level: Bachelor's degree (e.g., BA, AB, BS)   Occupational History    Not on file   Tobacco Use    Smoking status: Some Days     Current packs/day: 0.50     Types: Cigarettes    Smokeless tobacco: Never    Tobacco comments:     3/4 of a pack a day- trying to quit     5/7/24 - 1 cigarette weekly      10/16 - has not smoked for the past month    Vaping Use    Vaping status: Former    Substances: Nicotine, Flavoring    Devices: Disposable   Substance and Sexual Activity    Alcohol use: Not Currently    Drug use: No     Sexual activity: Not on file   Other Topics Concern    Not on file   Social History Narrative    Not on file     Social Drivers of Health     Financial Resource Strain: Low Risk  (8/3/2022)    Overall Financial Resource Strain (CARDIA)     Difficulty of Paying Living Expenses: Not very hard   Food Insecurity: No Food Insecurity (8/3/2022)    Hunger Vital Sign     Worried About Running Out of Food in the Last Year: Never true     Ran Out of Food in the Last Year: Never true   Transportation Needs: No Transportation Needs (8/3/2022)    PRAPARE - Transportation     Lack of Transportation (Medical): No     Lack of Transportation (Non-Medical): No   Physical Activity: Sufficiently Active (8/3/2022)    Exercise Vital Sign     Days of Exercise per Week: 4 days     Minutes of Exercise per Session: 60 min   Stress: Stress Concern Present (8/3/2022)    [employer] - Occupational Stress Questionnaire     Feeling of Stress : To some extent   Social Connections: Socially Isolated (8/3/2022)    Social Connection and Isolation Panel [NHANES]     Frequency of Communication with Friends and Family: More than three times a week     Frequency of Social Gatherings with Friends and Family: More than three times a week     Attends Oriental orthodox Services: Never     Active Member of Clubs or Organizations: No     Attends Club or Organization Meetings: Never     Marital Status: Never    Intimate Partner Violence: Not on file   Housing Stability: Unknown (8/3/2022)    Housing Stability Vital Sign     Unable to Pay for Housing in the Last Year: Not on file     Number of Places Lived in the Last Year: 2     Unstable Housing in the Last Year: No       Current Outpatient Medications   Medication Sig Dispense Refill    vitamin D2, Ergocalciferol, (DRISDOL) 1.25 MG (90578 UT) Cap capsule Take 1 Capsule by mouth every 7 days. 5 Capsule 1    escitalopram (LEXAPRO) 20 MG tablet Take 0.5-1 Tablets by mouth every day. 30  "Tablet 1    nicotine (NICODERM) 21 MG/24HR PATCH 24 HR Place 1 Patch on the skin every 24 hours. (Patient not taking: Reported on 11/4/2024) 30 Patch 1    nicotine polacrilex (NICORETTE) 2 MG Gum Take 1 Each by mouth 4 times a day as needed for Smoking Cessation (for breakthrough cravings). (Patient not taking: Reported on 11/4/2024) 150 Each 1     No current facility-administered medications for this visit.       Allergies   Allergen Reactions    Food      Other reaction(s): hives, breathing difficulties    Penicillins Hives       Review of Systems:  Review of Systems   Constitutional:  Negative for chills and fever.   HENT:  Negative for congestion and sore throat.    Respiratory:  Negative for cough and shortness of breath.    Cardiovascular:  Negative for chest pain and palpitations.   Gastrointestinal:  Positive for nausea. Negative for abdominal pain and vomiting.   Genitourinary:  Negative for dysuria and hematuria.   Musculoskeletal:  Negative for back pain and myalgias.   Skin:  Negative for itching and rash.   Neurological:  Negative for dizziness and headaches.        Medications:     Current Outpatient Medications:     vitamin D2 (Ergocalciferol), 50,000 Units, Oral, Q7 DAYS, Taking    escitalopram, 10-20 mg, Oral, QDAY, Taking    nicotine, 1 Patch, Transdermal, Q24HRS (Patient not taking: Reported on 11/4/2024), Not Taking    nicotine polacrilex, 2 mg, Oral, 4X/DAY PRN (Patient not taking: Reported on 11/4/2024), Not Taking     Objective:  Vitals:   /81 (BP Location: Left arm, Patient Position: Sitting, BP Cuff Size: Adult)   Pulse 83   Temp 36.9 °C (98.4 °F)   Ht 1.734 m (5' 8.25\")   Wt 84.1 kg (185 lb 6.4 oz)   SpO2 97%  Body mass index is 27.98 kg/m².    Physical Exam  Constitutional:       General: He is not in acute distress.     Appearance: Normal appearance.   HENT:      Head: Normocephalic and atraumatic.   Eyes:      Extraocular Movements: Extraocular movements intact.      Pupils: " Pupils are equal, round, and reactive to light.   Cardiovascular:      Rate and Rhythm: Normal rate and regular rhythm.   Pulmonary:      Effort: No respiratory distress.      Breath sounds: Normal breath sounds.   Abdominal:      General: Bowel sounds are normal. There is no distension.      Palpations: Abdomen is soft.      Tenderness: There is no abdominal tenderness.   Neurological:      General: No focal deficit present.      Mental Status: He is oriented to person, place, and time.          Results:    Lab Results   Component Value Date/Time    CHOLSTRLTOT 148 09/15/2022 03:37 PM    LDL 84 09/15/2022 03:37 PM    HDL 48 09/15/2022 03:37 PM    TRIGLYCERIDE 82 09/15/2022 03:37 PM       Lab Results   Component Value Date/Time    SODIUM 139 10/31/2024 05:46 AM    POTASSIUM 4.7 10/31/2024 05:46 AM    CHLORIDE 105 10/31/2024 05:46 AM    CO2 27 10/31/2024 05:46 AM    GLUCOSE 95 10/31/2024 05:46 AM    BUN 13 10/31/2024 05:46 AM    CREATININE 1.02 10/31/2024 05:46 AM     Lab Results   Component Value Date/Time    ALKPHOSPHAT 93 10/31/2024 05:46 AM    ASTSGOT 26 10/31/2024 05:46 AM    ALTSGPT 19 10/31/2024 05:46 AM    TBILIRUBIN 0.5 10/31/2024 05:46 AM        Lab Results   Component Value Date/Time    WBC 8.6 08/10/2024 08:35 AM    RBC 5.38 08/10/2024 08:35 AM    HEMOGLOBIN 16.1 08/10/2024 08:35 AM    HEMATOCRIT 48.5 08/10/2024 08:35 AM    MCV 90.1 08/10/2024 08:35 AM    MCH 29.9 08/10/2024 08:35 AM    MCHC 33.2 08/10/2024 08:35 AM    MPV 11.3 08/10/2024 08:35 AM    NEUTSPOLYS 66.00 10/20/2023 05:31 PM    LYMPHOCYTES 26.70 10/20/2023 05:31 PM    MONOCYTES 5.00 10/20/2023 05:31 PM    EOSINOPHILS 1.40 10/20/2023 05:31 PM    BASOPHILS 0.50 10/20/2023 05:31 PM        Assessment and Plan:    #Nausea improved  Patient experiences nausea after eating that is not associated with any certain types of foods.  It has now been improving.  He has never had any vomiting or abdominal pain during these episodes    #Bloody stools  resolved  At previous visit, patient was reporting bloody stools.  It is now resolved and he states that his stools are lemuel colored, pellety, and has craters/dents in them.  No abdominal pain.  Benign abdominal exam at this visit.  Likely could be due to hemorrhoids  Discussed obtaining FIT testing for complete workup and patient is agreeable  -Follow-up on FIT test    #Fatigue  Patient with ongoing fatigue.  He reports feeling tired despite getting adequate amount of sleep.  Not fully addressed at this visit.  However folate, B12, TSH within normal limits.  Undergoing workup with urology regarding testosterone.    #Anxiety  #OCD  Ongoing for many years.  Patient has been through many psychologists/psychiatrists and due to insurance issues has always needed to change.  Referral to behavioral health sent at last visit, however patient has yet to make appointment.  - Resources on CBT provided at last visit, reiterated for patient to explore those resources    Follow Up:  Return in about 8 weeks (around 12/30/2024) for Dr. Martin.

## 2024-11-04 NOTE — PROGRESS NOTES
Teaching Physician Attestation      Level of Participation    I have personally interviewed and examined the patient.  In addition, I discussed with the resident physician the patient's history, exam, assessment and plan in detail.  Topics listed in my addendum were the focus of the visit.  Healthcare maintenance was not addressed this visit unless listed as a topic in my addendum.  I agree with the plan as written along with the following additions/modifications:    N/v/bloody stools  -bloody stool resolved.  No abdo pain.  Nausea was already mild and is improving.  Question if related to anxiety, considering FIT testing as below.  If negative would follow clinically.  Reports no hemorrhoids currently    Possible tension headaches  -Patient did not offer headaches today.  Discussed MRI, B12, TSH, CMP are all negative.  Did not report brain fog today either.  Will follow clinically    OCD  -Patient has not looked at CBT specific resources yet.  However, related to workup of prior bloody stools and FIT testing patient inquired whether there were alternate options due to concerns of contamination.  Reviewed the process for obtaining FIT testing with the patient, and suggested that this could potentially be an excellent exposure to work up to to help increase his overall tolerance/comfort with contamination related topics.  Emphasized the importance of CBT for treating OCD, whether with resources provided last visit or with a therapist.    Fatigue, not fully addressed  -Patient reports fatigue today, he is smoking tobacco and has OCD, discussed how both of these could be related to his fatigue.  He is undergoing a workup with urology for testosterone.  Discussed with patient if this is negative happy to discuss fatigue more fully at follow-up    Apprecaite urology support for hematuria     Return to clinic in 3 months.  PCR.

## 2024-11-09 ENCOUNTER — APPOINTMENT (OUTPATIENT)
Dept: RADIOLOGY | Facility: MEDICAL CENTER | Age: 39
End: 2024-11-09
Attending: UROLOGY
Payer: MEDICAID

## 2024-11-09 ENCOUNTER — HOSPITAL ENCOUNTER (INPATIENT)
Facility: MEDICAL CENTER | Age: 39
LOS: 3 days | End: 2024-11-12
Attending: STUDENT IN AN ORGANIZED HEALTH CARE EDUCATION/TRAINING PROGRAM | Admitting: STUDENT IN AN ORGANIZED HEALTH CARE EDUCATION/TRAINING PROGRAM
Payer: MEDICAID

## 2024-11-09 ENCOUNTER — APPOINTMENT (OUTPATIENT)
Dept: RADIOLOGY | Facility: MEDICAL CENTER | Age: 39
End: 2024-11-09
Attending: STUDENT IN AN ORGANIZED HEALTH CARE EDUCATION/TRAINING PROGRAM
Payer: MEDICAID

## 2024-11-09 DIAGNOSIS — N13.2 HYDRONEPHROSIS WITH URINARY OBSTRUCTION DUE TO URETERAL CALCULUS: ICD-10-CM

## 2024-11-09 DIAGNOSIS — N10 ACUTE PYELONEPHRITIS: ICD-10-CM

## 2024-11-09 DIAGNOSIS — N17.9 AKI (ACUTE KIDNEY INJURY) (HCC): ICD-10-CM

## 2024-11-09 DIAGNOSIS — R91.8 PULMONARY NODULES: ICD-10-CM

## 2024-11-09 PROBLEM — F33.40 RECURRENT MAJOR DEPRESSIVE DISORDER, IN REMISSION (HCC): Status: ACTIVE | Noted: 2018-12-13

## 2024-11-09 LAB
ALBUMIN SERPL BCP-MCNC: 4.5 G/DL (ref 3.2–4.9)
ALBUMIN/GLOB SERPL: 1.7 G/DL
ALP SERPL-CCNC: 100 U/L (ref 30–99)
ALT SERPL-CCNC: 15 U/L (ref 2–50)
ANION GAP SERPL CALC-SCNC: 13 MMOL/L (ref 7–16)
APPEARANCE UR: ABNORMAL
APPEARANCE UR: ABNORMAL
AST SERPL-CCNC: 25 U/L (ref 12–45)
BACTERIA #/AREA URNS HPF: ABNORMAL /HPF
BACTERIA #/AREA URNS HPF: ABNORMAL /HPF
BASOPHILS # BLD AUTO: 0.5 % (ref 0–1.8)
BASOPHILS # BLD: 0.09 K/UL (ref 0–0.12)
BILIRUB SERPL-MCNC: 0.3 MG/DL (ref 0.1–1.5)
BILIRUB UR QL STRIP.AUTO: ABNORMAL
BILIRUB UR QL STRIP.AUTO: NEGATIVE
BUN SERPL-MCNC: 21 MG/DL (ref 8–22)
CALCIUM ALBUM COR SERPL-MCNC: 9.5 MG/DL (ref 8.5–10.5)
CALCIUM SERPL-MCNC: 9.9 MG/DL (ref 8.5–10.5)
CASTS URNS QL MICRO: ABNORMAL /LPF (ref 0–2)
CASTS URNS QL MICRO: ABNORMAL /LPF (ref 0–2)
CHLORIDE SERPL-SCNC: 104 MMOL/L (ref 96–112)
CO2 SERPL-SCNC: 22 MMOL/L (ref 20–33)
COLOR UR: ABNORMAL
COLOR UR: ABNORMAL
CREAT SERPL-MCNC: 1.08 MG/DL (ref 0.5–1.4)
EOSINOPHIL # BLD AUTO: 0.11 K/UL (ref 0–0.51)
EOSINOPHIL NFR BLD: 0.6 % (ref 0–6.9)
EPITHELIAL CELLS 1715: ABNORMAL /HPF (ref 0–5)
EPITHELIAL CELLS 1715: ABNORMAL /HPF (ref 0–5)
ERYTHROCYTE [DISTWIDTH] IN BLOOD BY AUTOMATED COUNT: 39.8 FL (ref 35.9–50)
GFR SERPLBLD CREATININE-BSD FMLA CKD-EPI: 90 ML/MIN/1.73 M 2
GLOBULIN SER CALC-MCNC: 2.6 G/DL (ref 1.9–3.5)
GLUCOSE SERPL-MCNC: 159 MG/DL (ref 65–99)
GLUCOSE UR STRIP.AUTO-MCNC: NEGATIVE MG/DL
GLUCOSE UR STRIP.AUTO-MCNC: NEGATIVE MG/DL
HCT VFR BLD AUTO: 46.9 % (ref 42–52)
HGB BLD-MCNC: 15.9 G/DL (ref 14–18)
IMM GRANULOCYTES # BLD AUTO: 0.08 K/UL (ref 0–0.11)
IMM GRANULOCYTES NFR BLD AUTO: 0.4 % (ref 0–0.9)
KETONES UR STRIP.AUTO-MCNC: 15 MG/DL
KETONES UR STRIP.AUTO-MCNC: 15 MG/DL
LEUKOCYTE ESTERASE UR QL STRIP.AUTO: ABNORMAL
LEUKOCYTE ESTERASE UR QL STRIP.AUTO: ABNORMAL
LYMPHOCYTES # BLD AUTO: 2.87 K/UL (ref 1–4.8)
LYMPHOCYTES NFR BLD: 15.8 % (ref 22–41)
MCH RBC QN AUTO: 29.6 PG (ref 27–33)
MCHC RBC AUTO-ENTMCNC: 33.9 G/DL (ref 32.3–36.5)
MCV RBC AUTO: 87.3 FL (ref 81.4–97.8)
MICRO URNS: ABNORMAL
MICRO URNS: ABNORMAL
MONOCYTES # BLD AUTO: 0.99 K/UL (ref 0–0.85)
MONOCYTES NFR BLD AUTO: 5.4 % (ref 0–13.4)
NEUTROPHILS # BLD AUTO: 14.05 K/UL (ref 1.82–7.42)
NEUTROPHILS NFR BLD: 77.3 % (ref 44–72)
NITRITE UR QL STRIP.AUTO: NEGATIVE
NITRITE UR QL STRIP.AUTO: NEGATIVE
NRBC # BLD AUTO: 0 K/UL
NRBC BLD-RTO: 0 /100 WBC (ref 0–0.2)
PH UR STRIP.AUTO: 5.5 [PH] (ref 5–8)
PH UR STRIP.AUTO: 5.5 [PH] (ref 5–8)
PLATELET # BLD AUTO: 229 K/UL (ref 164–446)
PMV BLD AUTO: 10.6 FL (ref 9–12.9)
POTASSIUM SERPL-SCNC: 3.7 MMOL/L (ref 3.6–5.5)
PROT SERPL-MCNC: 7.1 G/DL (ref 6–8.2)
PROT UR QL STRIP: 100 MG/DL
PROT UR QL STRIP: 30 MG/DL
RBC # BLD AUTO: 5.37 M/UL (ref 4.7–6.1)
RBC # URNS HPF: ABNORMAL /HPF (ref 0–2)
RBC # URNS HPF: ABNORMAL /HPF (ref 0–2)
RBC UR QL AUTO: ABNORMAL
RBC UR QL AUTO: ABNORMAL
SODIUM SERPL-SCNC: 139 MMOL/L (ref 135–145)
SP GR UR STRIP.AUTO: 1.02
SP GR UR STRIP.AUTO: 1.03
UROBILINOGEN UR STRIP.AUTO-MCNC: 1 EU/DL
UROBILINOGEN UR STRIP.AUTO-MCNC: 1 EU/DL
WBC # BLD AUTO: 18.2 K/UL (ref 4.8–10.8)
WBC #/AREA URNS HPF: ABNORMAL /HPF
WBC #/AREA URNS HPF: ABNORMAL /HPF

## 2024-11-09 PROCEDURE — 700102 HCHG RX REV CODE 250 W/ 637 OVERRIDE(OP): Performed by: STUDENT IN AN ORGANIZED HEALTH CARE EDUCATION/TRAINING PROGRAM

## 2024-11-09 PROCEDURE — 80053 COMPREHEN METABOLIC PANEL: CPT

## 2024-11-09 PROCEDURE — 700111 HCHG RX REV CODE 636 W/ 250 OVERRIDE (IP): Mod: JZ,UD | Performed by: STUDENT IN AN ORGANIZED HEALTH CARE EDUCATION/TRAINING PROGRAM

## 2024-11-09 PROCEDURE — 770001 HCHG ROOM/CARE - MED/SURG/GYN PRIV*

## 2024-11-09 PROCEDURE — A9270 NON-COVERED ITEM OR SERVICE: HCPCS | Performed by: STUDENT IN AN ORGANIZED HEALTH CARE EDUCATION/TRAINING PROGRAM

## 2024-11-09 PROCEDURE — 700105 HCHG RX REV CODE 258: Performed by: STUDENT IN AN ORGANIZED HEALTH CARE EDUCATION/TRAINING PROGRAM

## 2024-11-09 PROCEDURE — A9270 NON-COVERED ITEM OR SERVICE: HCPCS | Mod: UD | Performed by: STUDENT IN AN ORGANIZED HEALTH CARE EDUCATION/TRAINING PROGRAM

## 2024-11-09 PROCEDURE — 96375 TX/PRO/DX INJ NEW DRUG ADDON: CPT

## 2024-11-09 PROCEDURE — 36415 COLL VENOUS BLD VENIPUNCTURE: CPT

## 2024-11-09 PROCEDURE — 96376 TX/PRO/DX INJ SAME DRUG ADON: CPT

## 2024-11-09 PROCEDURE — 99223 1ST HOSP IP/OBS HIGH 75: CPT | Mod: 25 | Performed by: STUDENT IN AN ORGANIZED HEALTH CARE EDUCATION/TRAINING PROGRAM

## 2024-11-09 PROCEDURE — 99406 BEHAV CHNG SMOKING 3-10 MIN: CPT | Performed by: STUDENT IN AN ORGANIZED HEALTH CARE EDUCATION/TRAINING PROGRAM

## 2024-11-09 PROCEDURE — 81001 URINALYSIS AUTO W/SCOPE: CPT | Mod: 91

## 2024-11-09 PROCEDURE — 74176 CT ABD & PELVIS W/O CONTRAST: CPT

## 2024-11-09 PROCEDURE — 96374 THER/PROPH/DIAG INJ IV PUSH: CPT

## 2024-11-09 PROCEDURE — 700102 HCHG RX REV CODE 250 W/ 637 OVERRIDE(OP): Mod: UD | Performed by: STUDENT IN AN ORGANIZED HEALTH CARE EDUCATION/TRAINING PROGRAM

## 2024-11-09 PROCEDURE — 99285 EMERGENCY DEPT VISIT HI MDM: CPT

## 2024-11-09 PROCEDURE — 87086 URINE CULTURE/COLONY COUNT: CPT

## 2024-11-09 PROCEDURE — 85025 COMPLETE CBC W/AUTO DIFF WBC: CPT

## 2024-11-09 PROCEDURE — 700111 HCHG RX REV CODE 636 W/ 250 OVERRIDE (IP): Mod: JZ | Performed by: STUDENT IN AN ORGANIZED HEALTH CARE EDUCATION/TRAINING PROGRAM

## 2024-11-09 RX ORDER — CEFTRIAXONE 2 G/1
2000 INJECTION, POWDER, FOR SOLUTION INTRAMUSCULAR; INTRAVENOUS ONCE
Status: COMPLETED | OUTPATIENT
Start: 2024-11-09 | End: 2024-11-09

## 2024-11-09 RX ORDER — SODIUM CHLORIDE 9 MG/ML
INJECTION, SOLUTION INTRAVENOUS CONTINUOUS
Status: DISCONTINUED | OUTPATIENT
Start: 2024-11-09 | End: 2024-11-10

## 2024-11-09 RX ORDER — MORPHINE SULFATE 4 MG/ML
4 INJECTION INTRAVENOUS EVERY 4 HOURS PRN
Status: DISCONTINUED | OUTPATIENT
Start: 2024-11-09 | End: 2024-11-12 | Stop reason: HOSPADM

## 2024-11-09 RX ORDER — UBIDECARENONE 75 MG
100 CAPSULE ORAL DAILY
COMMUNITY

## 2024-11-09 RX ORDER — MULTIVIT-MIN/FOLIC/VIT K/LYCOP 400-300MCG
1 TABLET ORAL DAILY
COMMUNITY

## 2024-11-09 RX ORDER — ONDANSETRON 2 MG/ML
4 INJECTION INTRAMUSCULAR; INTRAVENOUS ONCE
Status: COMPLETED | OUTPATIENT
Start: 2024-11-09 | End: 2024-11-09

## 2024-11-09 RX ORDER — KETOROLAC TROMETHAMINE 15 MG/ML
15 INJECTION, SOLUTION INTRAMUSCULAR; INTRAVENOUS ONCE
Status: COMPLETED | OUTPATIENT
Start: 2024-11-09 | End: 2024-11-09

## 2024-11-09 RX ORDER — ACETAMINOPHEN 325 MG/1
650 TABLET ORAL ONCE
Status: COMPLETED | OUTPATIENT
Start: 2024-11-09 | End: 2024-11-09

## 2024-11-09 RX ORDER — ACETAMINOPHEN 325 MG/1
650 TABLET ORAL EVERY 6 HOURS PRN
Status: DISCONTINUED | OUTPATIENT
Start: 2024-11-09 | End: 2024-11-12 | Stop reason: HOSPADM

## 2024-11-09 RX ORDER — NICOTINE 21 MG/24HR
21 PATCH, TRANSDERMAL 24 HOURS TRANSDERMAL
Status: DISCONTINUED | OUTPATIENT
Start: 2024-11-09 | End: 2024-11-12 | Stop reason: HOSPADM

## 2024-11-09 RX ORDER — ESCITALOPRAM OXALATE 10 MG/1
10 TABLET ORAL
Status: DISCONTINUED | OUTPATIENT
Start: 2024-11-09 | End: 2024-11-12 | Stop reason: HOSPADM

## 2024-11-09 RX ORDER — ONDANSETRON 2 MG/ML
4 INJECTION INTRAMUSCULAR; INTRAVENOUS EVERY 6 HOURS PRN
Status: DISCONTINUED | OUTPATIENT
Start: 2024-11-09 | End: 2024-11-12 | Stop reason: HOSPADM

## 2024-11-09 RX ORDER — MORPHINE SULFATE 4 MG/ML
4 INJECTION INTRAVENOUS ONCE
Status: COMPLETED | OUTPATIENT
Start: 2024-11-09 | End: 2024-11-09

## 2024-11-09 RX ORDER — TRAMADOL HYDROCHLORIDE 50 MG/1
50 TABLET ORAL EVERY 4 HOURS PRN
Status: DISCONTINUED | OUTPATIENT
Start: 2024-11-09 | End: 2024-11-12 | Stop reason: HOSPADM

## 2024-11-09 RX ORDER — KETOROLAC TROMETHAMINE 15 MG/ML
15 INJECTION, SOLUTION INTRAMUSCULAR; INTRAVENOUS EVERY 6 HOURS PRN
Status: DISCONTINUED | OUTPATIENT
Start: 2024-11-09 | End: 2024-11-10

## 2024-11-09 RX ORDER — LABETALOL HYDROCHLORIDE 5 MG/ML
10 INJECTION, SOLUTION INTRAVENOUS EVERY 4 HOURS PRN
Status: DISCONTINUED | OUTPATIENT
Start: 2024-11-09 | End: 2024-11-12 | Stop reason: HOSPADM

## 2024-11-09 RX ADMIN — CEFTRIAXONE SODIUM 2000 MG: 2 INJECTION, POWDER, FOR SOLUTION INTRAMUSCULAR; INTRAVENOUS at 06:38

## 2024-11-09 RX ADMIN — ESCITALOPRAM OXALATE 10 MG: 10 TABLET ORAL at 08:52

## 2024-11-09 RX ADMIN — SODIUM CHLORIDE: 9 INJECTION, SOLUTION INTRAVENOUS at 10:07

## 2024-11-09 RX ADMIN — MORPHINE SULFATE 4 MG: 4 INJECTION INTRAVENOUS at 04:37

## 2024-11-09 RX ADMIN — NICOTINE POLACRILEX 2 MG: 2 GUM, CHEWING BUCCAL at 17:04

## 2024-11-09 RX ADMIN — NICOTINE TRANSDERMAL SYSTEM 21 MG: 21 PATCH, EXTENDED RELEASE TRANSDERMAL at 10:00

## 2024-11-09 RX ADMIN — KETOROLAC TROMETHAMINE 15 MG: 15 INJECTION, SOLUTION INTRAMUSCULAR; INTRAVENOUS at 17:04

## 2024-11-09 RX ADMIN — MORPHINE SULFATE 4 MG: 4 INJECTION INTRAVENOUS at 06:09

## 2024-11-09 RX ADMIN — ONDANSETRON 4 MG: 2 INJECTION INTRAMUSCULAR; INTRAVENOUS at 04:37

## 2024-11-09 RX ADMIN — KETOROLAC TROMETHAMINE 15 MG: 15 INJECTION, SOLUTION INTRAMUSCULAR; INTRAVENOUS at 04:34

## 2024-11-09 RX ADMIN — ACETAMINOPHEN 650 MG: 325 TABLET ORAL at 19:50

## 2024-11-09 RX ADMIN — SODIUM CHLORIDE: 9 INJECTION, SOLUTION INTRAVENOUS at 20:14

## 2024-11-09 RX ADMIN — NICOTINE POLACRILEX 2 MG: 2 GUM, CHEWING BUCCAL at 10:01

## 2024-11-09 RX ADMIN — ACETAMINOPHEN 650 MG: 325 TABLET ORAL at 05:46

## 2024-11-09 RX ADMIN — KETOROLAC TROMETHAMINE 15 MG: 15 INJECTION, SOLUTION INTRAMUSCULAR; INTRAVENOUS at 08:52

## 2024-11-09 RX ADMIN — TRAMADOL HYDROCHLORIDE 50 MG: 50 TABLET ORAL at 14:33

## 2024-11-09 ASSESSMENT — LIFESTYLE VARIABLES
ALCOHOL_USE: NO
AVERAGE NUMBER OF DAYS PER WEEK YOU HAVE A DRINK CONTAINING ALCOHOL: 0
HAVE PEOPLE ANNOYED YOU BY CRITICIZING YOUR DRINKING: NO
TOTAL SCORE: 0
HAVE PEOPLE ANNOYED YOU BY CRITICIZING YOUR DRINKING: NO
ALCOHOL_USE: NO
DOES PATIENT WANT TO STOP DRINKING: NO
EVER HAD A DRINK FIRST THING IN THE MORNING TO STEADY YOUR NERVES TO GET RID OF A HANGOVER: NO
TOTAL SCORE: 0
EVER FELT BAD OR GUILTY ABOUT YOUR DRINKING: NO
CONSUMPTION TOTAL: INCOMPLETE
ALCOHOL_USE: NO
ON A TYPICAL DAY WHEN YOU DRINK ALCOHOL HOW MANY DRINKS DO YOU HAVE: 0
DOES PATIENT WANT TO STOP DRINKING: NO
DOES PATIENT WANT TO STOP DRINKING: NO
HAVE YOU EVER FELT YOU SHOULD CUT DOWN ON YOUR DRINKING: NO
EVER HAD A DRINK FIRST THING IN THE MORNING TO STEADY YOUR NERVES TO GET RID OF A HANGOVER: NO
TOTAL SCORE: 0
TOTAL SCORE: 0
HAVE PEOPLE ANNOYED YOU BY CRITICIZING YOUR DRINKING: NO
TOTAL SCORE: 0
HAVE YOU EVER FELT YOU SHOULD CUT DOWN ON YOUR DRINKING: NO
CONSUMPTION TOTAL: NEGATIVE
CONSUMPTION TOTAL: INCOMPLETE
ON A TYPICAL DAY WHEN YOU DRINK ALCOHOL HOW MANY DRINKS DO YOU HAVE: 0
TOTAL SCORE: 0
HAVE YOU EVER FELT YOU SHOULD CUT DOWN ON YOUR DRINKING: NO
TOTAL SCORE: 0
EVER FELT BAD OR GUILTY ABOUT YOUR DRINKING: NO
HOW MANY TIMES IN THE PAST YEAR HAVE YOU HAD 5 OR MORE DRINKS IN A DAY: 0
EVER HAD A DRINK FIRST THING IN THE MORNING TO STEADY YOUR NERVES TO GET RID OF A HANGOVER: NO
EVER FELT BAD OR GUILTY ABOUT YOUR DRINKING: NO
TOTAL SCORE: 0
TOTAL SCORE: 0

## 2024-11-09 ASSESSMENT — PAIN DESCRIPTION - PAIN TYPE
TYPE: ACUTE PAIN

## 2024-11-09 ASSESSMENT — ENCOUNTER SYMPTOMS
FEVER: 0
COUGH: 0
DIARRHEA: 0
NAUSEA: 0
ABDOMINAL PAIN: 0
VOMITING: 0
CHILLS: 0
SHORTNESS OF BREATH: 0
FLANK PAIN: 1

## 2024-11-09 ASSESSMENT — COGNITIVE AND FUNCTIONAL STATUS - GENERAL
DAILY ACTIVITIY SCORE: 24
SUGGESTED CMS G CODE MODIFIER MOBILITY: CH
MOBILITY SCORE: 24
SUGGESTED CMS G CODE MODIFIER DAILY ACTIVITY: CH

## 2024-11-09 ASSESSMENT — SOCIAL DETERMINANTS OF HEALTH (SDOH)
IN THE PAST 12 MONTHS, HAS THE ELECTRIC, GAS, OIL, OR WATER COMPANY THREATENED TO SHUT OFF SERVICE IN YOUR HOME?: NO
WITHIN THE PAST 12 MONTHS, THE FOOD YOU BOUGHT JUST DIDN'T LAST AND YOU DIDN'T HAVE MONEY TO GET MORE: NEVER TRUE
WITHIN THE LAST YEAR, HAVE YOU BEEN KICKED, HIT, SLAPPED, OR OTHERWISE PHYSICALLY HURT BY YOUR PARTNER OR EX-PARTNER?: NO
WITHIN THE PAST 12 MONTHS, YOU WORRIED THAT YOUR FOOD WOULD RUN OUT BEFORE YOU GOT THE MONEY TO BUY MORE: NEVER TRUE
WITHIN THE PAST 12 MONTHS, THE FOOD YOU BOUGHT JUST DIDN'T LAST AND YOU DIDN'T HAVE MONEY TO GET MORE: NEVER TRUE
WITHIN THE LAST YEAR, HAVE YOU BEEN AFRAID OF YOUR PARTNER OR EX-PARTNER?: NO
WITHIN THE LAST YEAR, HAVE TO BEEN RAPED OR FORCED TO HAVE ANY KIND OF SEXUAL ACTIVITY BY YOUR PARTNER OR EX-PARTNER?: NO
WITHIN THE PAST 12 MONTHS, YOU WORRIED THAT YOUR FOOD WOULD RUN OUT BEFORE YOU GOT THE MONEY TO BUY MORE: NEVER TRUE
IN THE PAST 12 MONTHS, HAS THE ELECTRIC, GAS, OIL, OR WATER COMPANY THREATENED TO SHUT OFF SERVICE IN YOUR HOME?: NO
WITHIN THE LAST YEAR, HAVE YOU BEEN HUMILIATED OR EMOTIONALLY ABUSED IN OTHER WAYS BY YOUR PARTNER OR EX-PARTNER?: NO

## 2024-11-09 ASSESSMENT — FIBROSIS 4 INDEX
FIB4 SCORE: 1.04
FIB4 SCORE: 1.03

## 2024-11-09 ASSESSMENT — PATIENT HEALTH QUESTIONNAIRE - PHQ9
2. FEELING DOWN, DEPRESSED, IRRITABLE, OR HOPELESS: SEVERAL DAYS
1. LITTLE INTEREST OR PLEASURE IN DOING THINGS: SEVERAL DAYS
6. FEELING BAD ABOUT YOURSELF - OR THAT YOU ARE A FAILURE OR HAVE LET YOURSELF OR YOUR FAMILY DOWN: SEVERAL DAYS
5. POOR APPETITE OR OVEREATING: SEVERAL DAYS
SUM OF ALL RESPONSES TO PHQ9 QUESTIONS 1 AND 2: 2
9. THOUGHTS THAT YOU WOULD BE BETTER OFF DEAD, OR OF HURTING YOURSELF: NOT AT ALL
3. TROUBLE FALLING OR STAYING ASLEEP OR SLEEPING TOO MUCH: NEARLY EVERY DAY
SUM OF ALL RESPONSES TO PHQ QUESTIONS 1-9: 12
7. TROUBLE CONCENTRATING ON THINGS, SUCH AS READING THE NEWSPAPER OR WATCHING TELEVISION: MORE THAN HALF THE DAYS
4. FEELING TIRED OR HAVING LITTLE ENERGY: MORE THAN HALF THE DAYS
8. MOVING OR SPEAKING SO SLOWLY THAT OTHER PEOPLE COULD HAVE NOTICED. OR THE OPPOSITE, BEING SO FIGETY OR RESTLESS THAT YOU HAVE BEEN MOVING AROUND A LOT MORE THAN USUAL: SEVERAL DAYS

## 2024-11-09 NOTE — ED PROVIDER NOTES
CHIEF COMPLAINT  Chief Complaint   Patient presents with    Flank Pain    N/V       LIMITATION TO HISTORY   Select: None    HPI    Jarvis Perez is a 39 y.o. male who presents to the Emergency Department evaluation of right sided flank pain.  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  He described as colicky in nature.  Associate with nausea vomiting.  No diarrhea no measurable fevers increased urinary frequency urgency or other complaints.    OUTSIDE HISTORIAN(S):  Select: Mother states that patient has not been sleeping well recently as patient's brother recently had a wedding and the patient has been stressed    EXTERNAL RECORDS REVIEWED  Select: Other office visit 11/4/2024, patient was seen for nausea vomiting as well as bloody stools tension headaches, as well as fatigue      PAST MEDICAL HISTORY  Past Medical History:   Diagnosis Date    Herpesvirus infection 12/13/2018    Psychiatric disorder     anxiety,  OCD     .    SURGICAL HISTORY  History reviewed. No pertinent surgical history.      FAMILY HISTORY  Family History   Problem Relation Age of Onset    No Known Problems Mother     Coronary artery disease Father     Stroke Father     Stroke Paternal Grandmother     Stroke Paternal Grandfather           SOCIAL HISTORY  Social History     Socioeconomic History    Marital status: Single     Spouse name: Not on file    Number of children: Not on file    Years of education: Not on file    Highest education level: Bachelor's degree (e.g., BA, AB, BS)   Occupational History    Not on file   Tobacco Use    Smoking status: Every Day     Current packs/day: 0.50     Types: Cigarettes    Smokeless tobacco: Never    Tobacco comments:     3/4 of a pack a day- trying to quit     5/7/24 - 1 cigarette weekly      10/16 - has not smoked for the past month    Vaping Use    Vaping status:  Former    Substances: Nicotine, Flavoring    Devices: Disposable   Substance and Sexual Activity    Alcohol use: Not Currently    Drug use: No    Sexual activity: Not on file   Other Topics Concern    Not on file   Social History Narrative    Not on file     Social Drivers of Health     Financial Resource Strain: Low Risk  (8/3/2022)    Overall Financial Resource Strain (CARDIA)     Difficulty of Paying Living Expenses: Not very hard   Food Insecurity: No Food Insecurity (8/3/2022)    Hunger Vital Sign     Worried About Running Out of Food in the Last Year: Never true     Ran Out of Food in the Last Year: Never true   Transportation Needs: No Transportation Needs (8/3/2022)    PRAPARE - Transportation     Lack of Transportation (Medical): No     Lack of Transportation (Non-Medical): No   Physical Activity: Sufficiently Active (8/3/2022)    Exercise Vital Sign     Days of Exercise per Week: 4 days     Minutes of Exercise per Session: 60 min   Stress: Stress Concern Present (8/3/2022)    [employer] [place] [employer] - Occupational Stress Questionnaire     Feeling of Stress : To some extent   Social Connections: Socially Isolated (8/3/2022)    Social Connection and Isolation Panel [NHANES]     Frequency of Communication with Friends and Family: More than three times a week     Frequency of Social Gatherings with Friends and Family: More than three times a week     Attends [employer]ian Services: Never     Active Member of Clubs or Organizations: No     Attends Club or Organization Meetings: Never     Marital Status: Never    Intimate Partner Violence: Not on file   Housing Stability: Unknown (8/3/2022)    Housing Stability Vital Sign     Unable to Pay for Housing in the Last Year: Not on file     Number of Places Lived in the Last Year: 2     Unstable Housing in the Last Year: No         CURRENT MEDICATIONS  No current facility-administered medications on file prior to encounter.     Current Outpatient  "Medications on File Prior to Encounter   Medication Sig Dispense Refill    nicotine (NICODERM) 21 MG/24HR PATCH 24 HR Place 1 Patch on the skin every 24 hours. (Patient not taking: Reported on 11/4/2024) 30 Patch 1    nicotine polacrilex (NICORETTE) 2 MG Gum Take 1 Each by mouth 4 times a day as needed for Smoking Cessation (for breakthrough cravings). (Patient not taking: Reported on 11/4/2024) 150 Each 1    vitamin D2, Ergocalciferol, (DRISDOL) 1.25 MG (88693 UT) Cap capsule Take 1 Capsule by mouth every 7 days. 5 Capsule 1    escitalopram (LEXAPRO) 20 MG tablet Take 0.5-1 Tablets by mouth every day. 30 Tablet 1           ALLERGIES  Allergies   Allergen Reactions    Food      Other reaction(s): hives, breathing difficulties    Penicillins Hives       PHYSICAL EXAM  VITAL SIGNS:/57   Pulse 65   Temp 35.9 °C (96.6 °F) (Temporal)   Resp 18   Ht 1.727 m (5' 8\")   Wt 83.8 kg (184 lb 11.9 oz)   SpO2 94%   BMI 28.09 kg/m²       VITALS - vital signs documented prior to this note have been reviewed and noted,  GENERAL - awake, alert, oriented, GCS 15, no apparent distress, non-toxic  appearing  HEENT - normocephalic, atraumatic, pupils equal, sclera anicteric, mucus  membranes moist  NECK - supple, no meningismus, full active range of motion, trachea midline  CARDIOVASCULAR - regular rate/rhythm, no murmurs/gallops/rubs  PULMONARY - no respiratory distress, speaking in full sentences, clear to  auscultation bilaterally, no wheezing/ronchi/rales, no accessory muscle use  GASTROINTESTINAL - soft, non-tender, non-distended, no rebound, guarding,  or peritonitis  Back: No relation rashes contusions abrasions no CVA tenderness  GENITOURINARY - Deferred  NEUROLOGIC - Awake alert, normal mental status, speech fluid, cognition  normal, moves all extremities  MUSCULOSKELETAL - no obvious asymmetry or deformities present  EXTREMITIES - warm, well-perfused, no cyanosis or significant edema  DERMATOLOGIC - warm, dry, no " rashes, no jaundice  PSYCHIATRIC - normal affect, normal insight, normal concentration    DIAGNOSTIC STUDIES / PROCEDURES    LABS  Labs Reviewed   CBC WITH DIFFERENTIAL - Abnormal; Notable for the following components:       Result Value    WBC 18.2 (*)     Neutrophils-Polys 77.30 (*)     Lymphocytes 15.80 (*)     Neutrophils (Absolute) 14.05 (*)     Monos (Absolute) 0.99 (*)     All other components within normal limits   COMP METABOLIC PANEL - Abnormal; Notable for the following components:    Glucose 159 (*)     Alkaline Phosphatase 100 (*)     All other components within normal limits   URINALYSIS - Abnormal; Notable for the following components:    Color Orange (*)     Character Cloudy (*)     Ketones 15 (*)     Protein 30 (*)     Leukocyte Esterase Trace (*)     Occult Blood Large (*)     All other components within normal limits   URINE MICROSCOPIC (W/UA) - Abnormal; Notable for the following components:    RBC  (*)     Bacteria Many (*)     All other components within normal limits   URINALYSIS - Abnormal; Notable for the following components:    Color Red (*)     Character Turbid (*)     Ketones 15 (*)     Protein 100 (*)     Bilirubin Small (*)     Leukocyte Esterase Small (*)     Occult Blood Large (*)     All other components within normal limits   URINE MICROSCOPIC (W/UA) - Abnormal; Notable for the following components:    WBC 6-10 (*)     RBC  (*)     Bacteria Few (*)     All other components within normal limits   ESTIMATED GFR   URINE CULTURE(NEW)       Urinalysis does have many bacteria concern raising concern for possible infected ureteral like the cyst there is a leukocytosis to 18  RADIOLOGY  I have independently interpreted the diagnostic imaging associated with this visit and am waiting the final reading from the radiologist.   My preliminary interpretation is as follows: Right sided ureteral stone      Radiologist interpretation:   CT-RENAL COLIC EVALUATION(A/P W/O)   Final Result          1.  4 mm mid right ureteral stone resulting in right urinary outflow tract obstructive changes   2.  Ill-defined low-density lesion right hepatic lobe, could represent small hemangioma, otherwise indeterminate. Could be followed up with three-phase CT liver for further characterization.   3.  Pulmonary nodules, see nodule follow-up recommendations below.      Fleischner Society pulmonary nodule recommendations:   Low Risk: No routine follow-up      High Risk: Optional CT at 12 months      Comments: Use most suspicious nodule as guide to management. Follow-up intervals may vary according to size and risk.      Low Risk - Minimal or absent history of smoking and of other known risk factors.      High Risk - History of smoking or of other known risk factors.      Note: These recommendations do not apply to lung cancer screening, patients with immunosuppression, or patients with known primary cancer.      Fleischner Society 2017 Guidelines for Management of Incidentally Detected Pulmonary Nodules in Adults              COURSE & MEDICAL DECISION MAKING    ED COURSE:        INTERVENTIONS BY ME:  Medications   cefTRIAXone (Rocephin) injection 2,000 mg (2,000 mg Intravenous Given 11/9/24 0638)   NS infusion (has no administration in time range)   acetaminophen (Tylenol) tablet 650 mg (has no administration in time range)   labetalol (Normodyne/Trandate) injection 10 mg (has no administration in time range)   traMADol (Ultram) 50 MG tablet 50 mg (has no administration in time range)   ketorolac (Toradol) 15 MG/ML injection 15 mg (has no administration in time range)   ceFAZolin (Ancef) IV syringe 2 g (has no administration in time range)   morphine 4 MG/ML injection 4 mg (4 mg Intravenous Given 11/9/24 0437)   acetaminophen (Tylenol) tablet 650 mg (650 mg Oral Given 11/9/24 0546)   ketorolac (Toradol) 15 MG/ML injection 15 mg (15 mg Intravenous Given 11/9/24 0434)   ondansetron (Zofran) syringe/vial injection 4 mg (4  mg Intravenous Given 11/9/24 0437)   morphine 4 MG/ML injection 4 mg (4 mg Intravenous Given 11/9/24 8815)         Reevaluation at 0 620 patient was having continued pain discussed admission for intractable pain patient was agreeable.  Will order dose of Rocephin for possible infected urolithiasis      INITIAL ASSESSMENT, COURSE AND PLAN  Care Narrative: Patient presented for evaluation of right flank pain.  Differential include was not limited to urinary tract infection pyelonephritis perinephric abscess or ureteral colic colitis enteritis aortic aneurysm or dissection among many other considerations.  CT renal colic study did show a 4 mm right ureteral stone with associated hydronephrosis.  Labs were obtained were remarkable for a leukocytosis.  Urinalysis did had no white blood cells though many bacteria trace leuks, raising concern for possible infected ureteral stone.  Patient was given a dose of Rocephin and a urine culture was ordered.  patient's pain was not well-controlled after 3 doses of IV narcotics given that the patient's pain is not controlled he has a questionably infected ureteral stone, will admit the patient for further care and evaluation.  Spoke with the hospitalist Dr. Estes graciously agreed to accept patient in service.  Patient follows up out of urology thus [facility] was paged Dr. Braswell recommended a antibiotics and making the patient n.p.o.  He is admitted in stable condition.         ADDITIONAL PROBLEM LIST    DISPOSITION AND DISCUSSIONS  I have discussed management of the patient with the following physicians and MEERA's: Urology, hospitalist    Escalation of care considered, and ultimately not performed:IV fluids        Decision tools and prescription drugs considered including, but not limited to: Antibiotics   .    FINAL DIAGNOSIS  1 ureteral colic  2.  Bacteria  3.  Nausea vomiting       Electronically signed by: Danny Ramírez DO ,6:41 AM 11/09/24

## 2024-11-09 NOTE — CONSULTS
UROLOGY Consult Note:    INO Fox  Date & Time note created:    11/9/2024   11:04 AM       Patient ID:   Name:             Jarvis Perez   YOB: 1985  Age:                 39 y.o.  male   MRN:               [id number]                                                             Reason for Consult:      Obstructing ureteral stone    History of Present Illness:    This is a 39 year old male who is known to [facility] as he has a history of gross hematuria with a negative workup in the past. He presented to the ED 11/09/2024 with severe right flank pain with associated nausea. During workup a CT scan was obtained which showed a 4 mm obstructing right ureteral stone. His creatinine is 1.08. A urinalysis was obtained which showed 6-10 WBC, few bacteria on microscopic evaluation.  He has received empiric antibiotics. At the time of the consult, he reports a dramatic improvement in flank pain, and reports it is very minimal at the current moment. He denies fevers, chills, nausea, vomiting. He denies prior history of stones. Denies chest pain, shortness of breath.    Review of Systems:      Constitutional: See HPI  Eyes: Denies changes in vision, no eye pain  Ears/Nose/Throat/Mouth: Denies nasal congestion or sore throat   Cardiovascular: no chest pain, no palpitations   Respiratory: no shortness of breath , Denies cough  Gastrointestinal/Hepatic: See HPI  Genitourinary: See HPI  Musculoskeletal/Rheum: Denies  joint pain and swelling, no edema  Skin: Denies rash  Neurological: Denies headache, confusion, memory loss or focal weakness/parasthesias  Psychiatric: denies mood disorder   Endocrine: Shelley thyroid problems  Heme/Oncology/Lymph Nodes: Denies enlarged lymph nodes, denies brusing or known bleeding disorder  All other systems were reviewed and are negative (AMA/CMS criteria)                Past Medical History:   Past Medical History:   Diagnosis Date    Asthma      Herpesvirus infection 12/13/2018    Psychiatric disorder     anxiety,  OCD     Active Hospital Problems    Diagnosis     Hydronephrosis with urinary obstruction due to ureteral calculus [N13.2]     Pulmonary nodules [R91.8]     Acute pyelonephritis [N10]     Tobacco use [Z72.0]     Recurrent major depressive disorder, in remission (HCC) [F33.40]        Past Surgical History:  History reviewed. No pertinent surgical history.    Hospital Medications:    Current Facility-Administered Medications:     NS infusion, , Intravenous, Continuous, Torrey Estes M.D., Last Rate: 125 mL/hr at 11/09/24 1007, New Bag at 11/09/24 1007    acetaminophen (Tylenol) tablet 650 mg, 650 mg, Oral, Q6HRS PRN, Torrey Estes M.D.    labetalol (Normodyne/Trandate) injection 10 mg, 10 mg, Intravenous, Q4HRS PRN, Torrey Estes M.D.    traMADol (Ultram) 50 MG tablet 50 mg, 50 mg, Oral, Q4HRS PRN, Torrey Estes M.D.    ketorolac (Toradol) 15 MG/ML injection 15 mg, 15 mg, Intravenous, Q6HRS PRN, Torrey Estes M.D., 15 mg at 11/09/24 0852    [START ON 11/10/2024] ceFAZolin (Ancef) IV syringe 2 g, 2 g, Intravenous, Q8HRS, Torrey Estes M.D.    escitalopram (Lexapro) tablet 10 mg, 10 mg, Oral, QDAY, Torrey Estes M.D., 10 mg at 11/09/24 0852    nicotine (Nicoderm) 21 MG/24HR 21 mg, 21 mg, Transdermal, Daily-0600, 21 mg at 11/09/24 1000 **AND** Nicotine Replacement Patient Education Materials, , , Once **AND** nicotine polacrilex (Nicorette) 2 MG piece 2 mg, 2 mg, Oral, Q HOUR PRN, Torrey Estes M.D., 2 mg at 11/09/24 1001    morphine 4 MG/ML injection 4 mg, 4 mg, Intravenous, Q4HRS PRN, Munadel A Franklyn, M.D.    Current Outpatient Medications:  Medications Prior to Admission   Medication Sig Dispense Refill Last Dose/Taking    Multiple Vitamin (ONE-A-DAY MENS) Tab Take 1 Tablet by mouth every day.   10/7/2024 Morning    cyanocobalamin (VITAMIN B-12) 100 MCG Tab Take 100 mcg by mouth every day.   11/7/2024 Morning    escitalopram  (LEXAPRO) 20 MG tablet Take 0.5-1 Tablets by mouth every day. 30 Tablet 1 11/8/2024 Morning    nicotine (NICODERM) 21 MG/24HR PATCH 24 HR Place 1 Patch on the skin every 24 hours. (Patient not taking: Reported on 11/9/2024) 30 Patch 1 Not Taking    nicotine polacrilex (NICORETTE) 2 MG Gum Take 1 Each by mouth 4 times a day as needed for Smoking Cessation (for breakthrough cravings). (Patient not taking: Reported on 11/9/2024) 150 Each 1 Not Taking    vitamin D2, Ergocalciferol, (DRISDOL) 1.25 MG (43835 UT) Cap capsule Take 1 Capsule by mouth every 7 days. 5 Capsule 1 11/4/2024       Medication Allergy:  Allergies   Allergen Reactions    Food      Other reaction(s): hives, breathing difficulties    Penicillins Hives       Family History:  Family History   Problem Relation Age of Onset    No Known Problems Mother     Coronary artery disease Father     Stroke Father     Stroke Paternal Grandmother     Stroke Paternal Grandfather        Social History:  Social History     Socioeconomic History    Marital status: Single     Spouse name: Not on file    Number of children: Not on file    Years of education: Not on file    Highest education level: Bachelor's degree (e.g., BA, AB, BS)   Occupational History    Not on file   Tobacco Use    Smoking status: Every Day     Current packs/day: 0.50     Types: Cigarettes    Smokeless tobacco: Never    Tobacco comments:     3/4 of a pack a day- trying to quit     5/7/24 - 1 cigarette weekly      10/16 - has not smoked for the past month    Vaping Use    Vaping status: Former    Substances: Nicotine, Flavoring    Devices: Disposable   Substance and Sexual Activity    Alcohol use: Not Currently    Drug use: No    Sexual activity: Not on file   Other Topics Concern    Not on file   Social History Narrative    Not on file     Social Drivers of Health     Financial Resource Strain: Low Risk  (8/3/2022)    Overall Financial Resource Strain (CARDIA)     Difficulty of Paying Living Expenses:  "Not very hard   Food Insecurity: No Food Insecurity (11/9/2024)    Hunger Vital Sign     Worried About Running Out of Food in the Last Year: Never true     Ran Out of Food in the Last Year: Never true   Transportation Needs: No Transportation Needs (11/9/2024)    PRAPARE - Transportation     Lack of Transportation (Medical): No     Lack of Transportation (Non-Medical): No   Physical Activity: Sufficiently Active (8/3/2022)    Exercise Vital Sign     Days of Exercise per Week: 4 days     Minutes of Exercise per Session: 60 min   Stress: Stress Concern Present (8/3/2022)    [employer] [place] - Occupational Stress Questionnaire     Feeling of Stress : To some extent   Social Connections: Socially Isolated (8/3/2022)    Social Connection and Isolation Panel [NHANES]     Frequency of Communication with Friends and Family: More than three times a week     Frequency of Social Gatherings with Friends and Family: More than three times a week     Attends Sikhism Services: Never     Active Member of Clubs or Organizations: No     Attends Club or Organization Meetings: Never     Marital Status: Never    Intimate Partner Violence: Not At Risk (11/9/2024)    Humiliation, Afraid, Rape, and Kick questionnaire     Fear of Current or Ex-Partner: No     Emotionally Abused: No     Physically Abused: No     Sexually Abused: No   Housing Stability: Unknown (11/9/2024)    Housing Stability Vital Sign     Unable to Pay for Housing in the Last Year: No     Number of Times Moved in the Last Year: Not on file     Homeless in the Last Year: No         Physical Exam:  Vitals/ General Appearance:   Weight/BMI: Body mass index is 27.86 kg/m².  /77   Pulse 66   Temp 36.6 °C (97.9 °F) (Temporal)   Resp 20   Ht 1.727 m (5' 8\")   Wt 83.1 kg (183 lb 3.2 oz)   SpO2 95%   Vitals:    11/09/24 0505 11/09/24 0605 11/09/24 0630 11/09/24 0744   BP: 117/69 103/57  118/77   Pulse: 61 67 65 66   Resp:    20   Temp:    " 36.6 °C (97.9 °F)   TempSrc:    Temporal   SpO2: 98%  94% 95%   Weight:    83.1 kg (183 lb 3.2 oz)   Height:         Oxygen Therapy:  Pulse Oximetry: 95 %, O2 (LPM): 0, O2 Delivery Device: None - Room Air    Constitutional:   No acute distress  HENMT:  Normocephalic, Atraumatic  Neck:  Normal range of motion  Lungs:  Normal breath sounds respiratory effort.  Abdomen: Soft, No tenderness, No guarding, No rebound tenderness  : No CVAT  Skin: Warm, Dry  Neurologic: Alert & oriented x 3  Psychiatric: Affect normal, Judgment normal, Mood normal.      MDM (Data Review):     Records reviewed and summarized in current documentation    Lab Data Review:  Recent Results (from the past 24 hours)   CBC WITH DIFFERENTIAL    Collection Time: 11/09/24  4:22 AM   Result Value Ref Range    WBC 18.2 (H) 4.8 - 10.8 K/uL    RBC 5.37 4.70 - 6.10 M/uL    Hemoglobin 15.9 14.0 - 18.0 g/dL    Hematocrit 46.9 42.0 - 52.0 %    MCV 87.3 81.4 - 97.8 fL    MCH 29.6 27.0 - 33.0 pg    MCHC 33.9 32.3 - 36.5 g/dL    RDW 39.8 35.9 - 50.0 fL    Platelet Count 229 164 - 446 K/uL    MPV 10.6 9.0 - 12.9 fL    Neutrophils-Polys 77.30 (H) 44.00 - 72.00 %    Lymphocytes 15.80 (L) 22.00 - 41.00 %    Monocytes 5.40 0.00 - 13.40 %    Eosinophils 0.60 0.00 - 6.90 %    Basophils 0.50 0.00 - 1.80 %    Immature Granulocytes 0.40 0.00 - 0.90 %    Nucleated RBC 0.00 0.00 - 0.20 /100 WBC    Neutrophils (Absolute) 14.05 (H) 1.82 - 7.42 K/uL    Lymphs (Absolute) 2.87 1.00 - 4.80 K/uL    Monos (Absolute) 0.99 (H) 0.00 - 0.85 K/uL    Eos (Absolute) 0.11 0.00 - 0.51 K/uL    Baso (Absolute) 0.09 0.00 - 0.12 K/uL    Immature Granulocytes (abs) 0.08 0.00 - 0.11 K/uL    NRBC (Absolute) 0.00 K/uL   CMP    Collection Time: 11/09/24  4:22 AM   Result Value Ref Range    Sodium 139 135 - 145 mmol/L    Potassium 3.7 3.6 - 5.5 mmol/L    Chloride 104 96 - 112 mmol/L    Co2 22 20 - 33 mmol/L    Anion Gap 13.0 7.0 - 16.0    Glucose 159 (H) 65 - 99 mg/dL    Bun 21 8 - 22 mg/dL     Creatinine 1.08 0.50 - 1.40 mg/dL    Calcium 9.9 8.5 - 10.5 mg/dL    Correct Calcium 9.5 8.5 - 10.5 mg/dL    AST(SGOT) 25 12 - 45 U/L    ALT(SGPT) 15 2 - 50 U/L    Alkaline Phosphatase 100 (H) 30 - 99 U/L    Total Bilirubin 0.3 0.1 - 1.5 mg/dL    Albumin 4.5 3.2 - 4.9 g/dL    Total Protein 7.1 6.0 - 8.2 g/dL    Globulin 2.6 1.9 - 3.5 g/dL    A-G Ratio 1.7 g/dL   ESTIMATED GFR    Collection Time: 11/09/24  4:22 AM   Result Value Ref Range    GFR (CKD-EPI) 90 >60 mL/min/1.73 m 2   URINALYSIS    Collection Time: 11/09/24  4:52 AM    Specimen: Urine   Result Value Ref Range    Color Orange (A)     Character Cloudy (A)     Specific Gravity 1.024 <1.035    Ph 5.5 5.0 - 8.0    Glucose Negative Negative mg/dL    Ketones 15 (A) Negative mg/dL    Protein 30 (A) Negative mg/dL    Bilirubin Negative Negative    Urobilinogen, Urine 1.0 <=1.0 EU/dL    Nitrite Negative Negative    Leukocyte Esterase Trace (A) Negative    Occult Blood Large (A) Negative    Micro Urine Req Microscopic    URINE MICROSCOPIC (W/UA)    Collection Time: 11/09/24  4:52 AM   Result Value Ref Range    WBC 0-2 /hpf    RBC  (A) 0 - 2 /hpf    Bacteria Many (A) None /hpf    Epithelial Cells 0-2 0 - 5 /hpf    Urine Casts 0-2 0 - 2 /lpf   URINALYSIS    Collection Time: 11/09/24  5:44 AM    Specimen: Urine   Result Value Ref Range    Color Red (A)     Character Turbid (A)     Specific Gravity 1.027 <1.035    Ph 5.5 5.0 - 8.0    Glucose Negative Negative mg/dL    Ketones 15 (A) Negative mg/dL    Protein 100 (A) Negative mg/dL    Bilirubin Small (A) Negative    Urobilinogen, Urine 1.0 <=1.0 EU/dL    Nitrite Negative Negative    Leukocyte Esterase Small (A) Negative    Occult Blood Large (A) Negative    Micro Urine Req Microscopic    URINE MICROSCOPIC (W/UA)    Collection Time: 11/09/24  5:44 AM   Result Value Ref Range    WBC 6-10 (A) /hpf    RBC  (A) 0 - 2 /hpf    Bacteria Few (A) None /hpf    Epithelial Cells 0-2 0 - 5 /hpf    Urine Casts 0-2 0 - 2  /lpf       Imaging/Procedures Review:    Reviewed    MDM (Assessment and Plan):     Active Hospital Problems    Diagnosis     Hydronephrosis with urinary obstruction due to ureteral calculus [N13.2]     Pulmonary nodules [R91.8]     Acute pyelonephritis [N10]     Tobacco use [Z72.0]     Recurrent major depressive disorder, in remission (HCC) [F33.40]      39 year old male admitted with right flank pain, and a 4 mm obstructing right ureteral stone.   Plan:  - monitor pain control  - trend renal function  - await culture results  - I discussed with patient the role of attempting to pass stone versus proceeding with a cystoscopy with ureteroscopy and laser lithotripsy along with risks and benefits. After discussion, since he has noticed a improvement in pain he elects to attempt to pass stone. Ok for diet today. We will make NPO at midnight and re-evaluate in the morning the role of OR for management of stone.   - Please strain urine. If stone collected, please send for stone analysis.  - urology will follow    Dr. Moreland is aware of this consult and the direction of the plan of care.       Perez Talavera, P.A.   [address] [address]   [phone]

## 2024-11-09 NOTE — PROGRESS NOTES
Called for patient with 4mm proximal stone. Has elevated WBC. No fever or grossly infected urine. Poor pain control. Will assess this am. If still in pain can plan for ureteroscopy and laser lithotripsy today. Otherwise, has moderate chance stone will be able to pass.

## 2024-11-09 NOTE — HOSPITAL COURSE
This is a 39-year-old male with a past medical significant for nicotine dependence, depression presented to the ER on 11/9/2024 with a complaint of flank pain associated with nausea and vomiting.    Presents to the ER, he is hemodynamically stable, WBC count 18.2 CT scan showing 4 mm obstructing stone, urology was consulted.  Cultures x 2 ordered, urine culture ordered, patient was started on antibiotics. Urology came and evaluated the patient, plan for surgical intervention tomorrow.    Interval events:  --Patient is alert, awake, answering question appropriately, vital sign reviewed  -- Blood cX X 2 pending, urine culture pending, patient is starting antibiotics, will continue will follow culture  --Urology evaluated the patient, plan for surgical intervention tomorrow, make n.p.o. at midnight

## 2024-11-09 NOTE — PROGRESS NOTES
[facility] Medicine Daily Progress Note    Date of Service  11/9/2024    Chief Complaint  Jarvis Perez is a 39 y.o. male admitted 11/9/2024 with   Chief Complaint   Patient presents with    Flank Pain    N/V         Hospital Course  This is a 39-year-old male with a past medical significant for nicotine dependence, depression presented to the ER on 11/9/2024 with a complaint of flank pain associated with nausea and vomiting.    Presents to the ER, he is hemodynamically stable, WBC count 18.2 CT scan showing 4 mm obstructing stone, urology was consulted.  Cultures x 2 ordered, urine culture ordered, patient was started on antibiotics. Urology came and evaluated the patient, plan for surgical intervention tomorrow.    Interval events:  --Patient is alert, awake, answering question appropriately, vital sign reviewed  -- Blood cX X 2 pending, urine culture pending, patient is starting antibiotics, will continue will follow culture  --Urology evaluated the patient, plan for surgical intervention tomorrow, make n.p.o. at midnight    I have discussed this patient's plan of care and discharge plan at IDT rounds today with Case Management, Nursing, Nursing leadership, and other members of the IDT team.    Consultants/Specialty  urology    Code Status  Full Code    Disposition  <MEDICALLYCLEARED>  I have placed the appropriate orders for post-discharge needs.    Review of Systems  ROS     Physical Exam  Temp:  [35.9 °C (96.6 °F)-36.6 °C (97.9 °F)] 36.6 °C (97.9 °F)  Pulse:  [61-77] 66  Resp:  [18-20] 20  BP: (103-150)/(57-85) 118/77  SpO2:  [94 %-100 %] 95 %    Physical Exam    Fluids    Intake/Output Summary (Last 24 hours) at 11/9/2024 1304  Last data filed at 11/9/2024 0800  Gross per 24 hour   Intake 0 ml   Output --   Net 0 ml        Laboratory  Recent Labs     11/09/24  0422   WBC 18.2*   RBC 5.37   HEMOGLOBIN 15.9   HEMATOCRIT 46.9   MCV 87.3   MCH 29.6   MCHC 33.9   RDW 39.8   PLATELETCT 229   MPV 10.6      Recent Labs     11/09/24  0422   SODIUM 139   POTASSIUM 3.7   CHLORIDE 104   CO2 22   GLUCOSE 159*   BUN 21   CREATININE 1.08   CALCIUM 9.9                   Imaging  CT-RENAL COLIC EVALUATION(A/P W/O)   Final Result         1.  4 mm mid right ureteral stone resulting in right urinary outflow tract obstructive changes   2.  Ill-defined low-density lesion right hepatic lobe, could represent small hemangioma, otherwise indeterminate. Could be followed up with three-phase CT liver for further characterization.   3.  Pulmonary nodules, see nodule follow-up recommendations below.      Fleischner Society pulmonary nodule recommendations:   Low Risk: No routine follow-up      High Risk: Optional CT at 12 months      Comments: Use most suspicious nodule as guide to management. Follow-up intervals may vary according to size and risk.      Low Risk - Minimal or absent history of smoking and of other known risk factors.      High Risk - History of smoking or of other known risk factors.      Note: These recommendations do not apply to lung cancer screening, patients with immunosuppression, or patients with known primary cancer.      Fleischner Society 2017 Guidelines for Management of Incidentally Detected Pulmonary Nodules in Adults              Assessment/Plan  * Hydronephrosis with urinary obstruction due to ureteral calculus- (present on admission)  Assessment & Plan  Comes in with right flank pain.  CT scan reviewed and shows a 4 mm obstructing stone  Urology called and plan for surgical intervention tmrw  Npo at midnight  , IV fluids.  He is in significant pain requiring narcotic pain management, IV morphine ordered      Acute pyelonephritis- (present on admission)  Assessment & Plan  UA infectious appearing, stone on imaging  Started on IV antibiotics  Follow blood and urine cx      Pulmonary nodules- (present on admission)  Assessment & Plan  Multiple pulmonary nodules.  Patient is a smoker, IP referral  to nodule  Clinic Placed    Tobacco use- (present on admission)  Assessment & Plan  Patient smokes more than 1 PPD.  Nicotine patch and/or gum ordered.   I discussed cessation with patient including starting on nicotine patch and/or gum on discharge.  I also discussed medications to help with cessation with patient including Wellbutrin and Chantix, declined as he is on antidepressants.  Smoking cessation discussed with patient for 4 minutes.     Recurrent major depressive disorder, in remission (HCC)- (present on admission)  Assessment & Plan  Continue escitalopram         VTE prophylaxis: scd

## 2024-11-09 NOTE — PROGRESS NOTES
4 Eyes Skin Assessment Completed by Carissa Corey, RN and Duc Mack, ASAEL.    Head WDL  Ears WDL  Nose WDL  Mouth WDL  Neck WDL  Breast/Chest WDL  Shoulder Blades WDL  Spine WDL  (R) Arm/Elbow/Hand WDL  (L) Arm/Elbow/Hand WDL  Abdomen WDL  Groin WDL  Scrotum/Coccyx/Buttocks WDL  (R) Leg WDL  (L) Leg WDL  (R) Heel/Foot/Toe dry crack on heel no bleeding  (L) Heel/Foot/Toe WDL          Devices In Places na      Interventions In Place N/A    Possible Skin Injury No    Pictures Uploaded Into Epic N/A  Wound Consult Placed N/A  RN Wound Prevention Protocol Ordered No

## 2024-11-09 NOTE — ASSESSMENT & PLAN NOTE
UA infectious appearing, stone on imaging  Blood cx X 2 and Urine cx x 2 pending    -- was on ancef and now switched to bactrim

## 2024-11-09 NOTE — ASSESSMENT & PLAN NOTE
Patient smokes more than 1 PPD.  Nicotine patch and/or gum ordered.   I discussed cessation with patient including starting on nicotine patch and/or gum on discharge.  I also discussed medications to help with cessation with patient including Wellbutrin and Chantix, declined as he is on antidepressants.  Smoking cessation discussed with patient for 4 minutes.

## 2024-11-09 NOTE — ED NOTES
Transport present to move patient. All belongings with patient for transport. Patient remains alert and aware of POC.

## 2024-11-09 NOTE — ED NOTES
Medication history reviewed with patient  Med rec is complete  Allergies reviewed  Patient denies any outpatient antibiotics in the last 30 days.   Anticoagulants taken in the last 14 days? None    Patient takes 1/2 to 1 tablet of Lexapro based on how bad his symptoms are    Heydi Oleary PhT

## 2024-11-09 NOTE — ED NOTES
Bedside report received from ASAEL Atkinson. Assumed patient care. Verified patient identification. Patient resting in bed, connected to monitor, respirations even and unlabored. Patient currently on RA,  Vital signs is stable. Discussed plan of care. Patient denied any new complaints. Standard safety precautions in place. [place] in low position, side rail up for pt safety. Call light within reach. Patient reports pain much improved at this time. He is ambulatory around room.

## 2024-11-09 NOTE — ED TRIAGE NOTES
"  Chief Complaint   Patient presents with    Flank Pain    N/V     Pt presents to triage for sudden onset right flank pain. Pt states he was lying in bed when the pain came on and he became nauseous and vomited 4 times. Denies dysuria, hematuria or hx of kidney stone. Pt actively vomiting in triage, charge RN notified.     .BP (!) 150/85   Pulse 75   Temp 35.9 °C (96.6 °F) (Temporal)   Resp 18   Ht 1.727 m (5' 8\")   Wt 83.8 kg (184 lb 11.9 oz)   SpO2 99%   BMI 28.09 kg/m²       "

## 2024-11-09 NOTE — ASSESSMENT & PLAN NOTE
Comes in with right flank pain.  CT scan reviewed and shows a 4 mm obstructing stone  Discussed with urology Dr. Mills, patient will be going for surgical intervention tomorrow.    N.p.o. at midnight

## 2024-11-09 NOTE — H&P
Hospital Medicine History & Physical Note    Date of Service  11/9/2024    Primary Care Physician  Merle Martin M.D.  Code Status  Full Code    Chief Complaint  Chief Complaint   Patient presents with    Flank Pain    N/V       History of Presenting Illness  Jarvis Perez is a 39 y.o. male who presented 11/9/2024 with flank pain.  PMH of depression, tobacco abuse.  Symptoms since tonight.  Denies fever/chills, no urinary symptoms.  No history of kidney stones in the past.    In the ED afebrile, hemodynamically stable.  Labs show WC count of 18.2, UA infectious pain.  CT scan shows a 4 mm obstructing stone.  He follows with Dr. Louise with urology for history of hematuria recent cystoscopy which was negative.    I discussed the plan of care with patient, bedside RN, and edp .    Review of Systems  Review of Systems   Constitutional:  Negative for chills and fever.   Respiratory:  Negative for cough and shortness of breath.    Cardiovascular:  Negative for chest pain.   Gastrointestinal:  Negative for abdominal pain, diarrhea, nausea and vomiting.   Genitourinary:  Positive for flank pain. Negative for dysuria and urgency.       Past Medical History   has a past medical history of Herpesvirus infection (12/13/2018) and Psychiatric disorder.    Surgical History   has no past surgical history on file.     Family History  family history includes Coronary artery disease in his father; No Known Problems in his mother; Stroke in his father, paternal grandfather, and paternal grandmother.   Family history reviewed with patient. There is no family history that is pertinent to the chief complaint.     Social History   reports that he has been smoking cigarettes. He has never used smokeless tobacco. He reports that he does not currently use alcohol. He reports that he does not use drugs.    Allergies  Allergies   Allergen Reactions    Food      Other reaction(s): hives, breathing difficulties    Penicillins Hives        Medications  Prior to Admission Medications   Prescriptions Last Dose Informant Patient Reported? Taking?   escitalopram (LEXAPRO) 20 MG tablet   No No   Sig: Take 0.5-1 Tablets by mouth every day.   nicotine (NICODERM) 21 MG/24HR PATCH 24 HR   No No   Sig: Place 1 Patch on the skin every 24 hours.   Patient not taking: Reported on 11/4/2024   nicotine polacrilex (NICORETTE) 2 MG Gum   No No   Sig: Take 1 Each by mouth 4 times a day as needed for Smoking Cessation (for breakthrough cravings).   Patient not taking: Reported on 11/4/2024   vitamin D2, Ergocalciferol, (DRISDOL) 1.25 MG (88813 UT) Cap capsule   No No   Sig: Take 1 Capsule by mouth every 7 days.      Facility-Administered Medications: None       Physical Exam  Temp:  [35.9 °C (96.6 °F)] 35.9 °C (96.6 °F)  Pulse:  [61-77] 65  Resp:  [18] 18  BP: (103-150)/(57-85) 103/57  SpO2:  [94 %-100 %] 94 %  Blood Pressure: 103/57   Temperature: 35.9 °C (96.6 °F)   Pulse: 65   Respiration: 18   Pulse Oximetry: 94 %       Physical Exam  Constitutional:       Appearance: Normal appearance.   HENT:      Head: Normocephalic and atraumatic.      Mouth/Throat:      Mouth: Mucous membranes are moist.      Pharynx: Oropharynx is clear. No oropharyngeal exudate or posterior oropharyngeal erythema.   Eyes:      General: No scleral icterus.  Cardiovascular:      Rate and Rhythm: Normal rate and regular rhythm.      Pulses: Normal pulses.      Heart sounds: Normal heart sounds. No murmur heard.  Pulmonary:      Effort: Pulmonary effort is normal. No respiratory distress.      Breath sounds: Normal breath sounds. No wheezing.   Abdominal:      Palpations: Abdomen is soft.      Tenderness: There is no abdominal tenderness.   Musculoskeletal:         General: No swelling or tenderness. Normal range of motion.      Cervical back: Normal range of motion.   Skin:     General: Skin is warm and dry.   Neurological:      General: No focal deficit present.      Mental Status: He is  "alert and oriented to person, place, and time. Mental status is at baseline.   Psychiatric:         Mood and Affect: Mood normal.         Laboratory:  Recent Labs     11/09/24 0422   WBC 18.2*   RBC 5.37   HEMOGLOBIN 15.9   HEMATOCRIT 46.9   MCV 87.3   MCH 29.6   MCHC 33.9   RDW 39.8   PLATELETCT 229   MPV 10.6     Recent Labs     11/09/24 0422   SODIUM 139   POTASSIUM 3.7   CHLORIDE 104   CO2 22   GLUCOSE 159*   BUN 21   CREATININE 1.08   CALCIUM 9.9     Recent Labs     11/09/24 0422   ALTSGPT 15   ASTSGOT 25   ALKPHOSPHAT 100*   TBILIRUBIN 0.3   GLUCOSE 159*         No results for input(s): \"NTPROBNP\" in the last 72 hours.      No results for input(s): \"TROPONINT\" in the last 72 hours.    Imaging:  CT-RENAL COLIC EVALUATION(A/P W/O)   Final Result         1.  4 mm mid right ureteral stone resulting in right urinary outflow tract obstructive changes   2.  Ill-defined low-density lesion right hepatic lobe, could represent small hemangioma, otherwise indeterminate. Could be followed up with three-phase CT liver for further characterization.   3.  Pulmonary nodules, see nodule follow-up recommendations below.      Fleischner Society pulmonary nodule recommendations:   Low Risk: No routine follow-up      High Risk: Optional CT at 12 months      Comments: Use most suspicious nodule as guide to management. Follow-up intervals may vary according to size and risk.      Low Risk - Minimal or absent history of smoking and of other known risk factors.      High Risk - History of smoking or of other known risk factors.      Note: These recommendations do not apply to lung cancer screening, patients with immunosuppression, or patients with known primary cancer.      Fleischner Society 2017 Guidelines for Management of Incidentally Detected Pulmonary Nodules in Adults           Assessment/Plan:  Justification for Admission Status  I anticipate this patient will require at least two midnights for appropriate medical " management, necessitating inpatient admission because kidney stone    * Hydronephrosis with urinary obstruction due to ureteral calculus- (present on admission)  Assessment & Plan  Comes in with right flank pain.  CT scan reviewed and shows a 4 mm obstructing stone  EDP discussed with urology who will evaluate the patient  N.p.o., IV fluids.  He is in significant pain requiring narcotic pain management, IV morphine ordered    Recurrent major depressive disorder, in remission (HCC)- (present on admission)  Assessment & Plan  Continue escitalopram    Acute pyelonephritis- (present on admission)  Assessment & Plan  UA infectious appearing, stone on imaging  Started on IV antibiotics      Pulmonary nodules- (present on admission)  Assessment & Plan  Multiple pulmonary nodules.  Patient is a smoker, IP referral  to nodule Clinic Placed    Tobacco use- (present on admission)  Assessment & Plan  Patient smokes more than 1 PPD.  Nicotine patch and/or gum ordered.   I discussed cessation with patient including starting on nicotine patch and/or gum on discharge.  I also discussed medications to help with cessation with patient including Wellbutrin and Chantix, declined as he is on antidepressants.  Smoking cessation discussed with patient for 4 minutes.         VTE prophylaxis: pharmacologic prophylaxis contraindicated due to possible need for procedure

## 2024-11-10 PROBLEM — N17.9 AKI (ACUTE KIDNEY INJURY) (HCC): Status: ACTIVE | Noted: 2024-11-10

## 2024-11-10 LAB
ALBUMIN SERPL BCP-MCNC: 3.6 G/DL (ref 3.2–4.9)
ANION GAP SERPL CALC-SCNC: 7 MMOL/L (ref 7–16)
ANION GAP SERPL CALC-SCNC: 9 MMOL/L (ref 7–16)
BASOPHILS # BLD AUTO: 0.5 % (ref 0–1.8)
BASOPHILS # BLD: 0.06 K/UL (ref 0–0.12)
BUN SERPL-MCNC: 20 MG/DL (ref 8–22)
BUN SERPL-MCNC: 24 MG/DL (ref 8–22)
CALCIUM ALBUM COR SERPL-MCNC: 9.1 MG/DL (ref 8.5–10.5)
CALCIUM SERPL-MCNC: 8.6 MG/DL (ref 8.5–10.5)
CALCIUM SERPL-MCNC: 8.8 MG/DL (ref 8.5–10.5)
CHLORIDE SERPL-SCNC: 108 MMOL/L (ref 96–112)
CHLORIDE SERPL-SCNC: 109 MMOL/L (ref 96–112)
CO2 SERPL-SCNC: 23 MMOL/L (ref 20–33)
CO2 SERPL-SCNC: 25 MMOL/L (ref 20–33)
CREAT SERPL-MCNC: 1.66 MG/DL (ref 0.5–1.4)
CREAT SERPL-MCNC: 1.85 MG/DL (ref 0.5–1.4)
CREAT UR-MCNC: 228 MG/DL
EOSINOPHIL # BLD AUTO: 0.14 K/UL (ref 0–0.51)
EOSINOPHIL NFR BLD: 1.1 % (ref 0–6.9)
ERYTHROCYTE [DISTWIDTH] IN BLOOD BY AUTOMATED COUNT: 41.8 FL (ref 35.9–50)
GFR SERPLBLD CREATININE-BSD FMLA CKD-EPI: 47 ML/MIN/1.73 M 2
GFR SERPLBLD CREATININE-BSD FMLA CKD-EPI: 54 ML/MIN/1.73 M 2
GLUCOSE SERPL-MCNC: 96 MG/DL (ref 65–99)
GLUCOSE SERPL-MCNC: 96 MG/DL (ref 65–99)
HCT VFR BLD AUTO: 42 % (ref 42–52)
HGB BLD-MCNC: 13.5 G/DL (ref 14–18)
IMM GRANULOCYTES # BLD AUTO: 0.04 K/UL (ref 0–0.11)
IMM GRANULOCYTES NFR BLD AUTO: 0.3 % (ref 0–0.9)
LYMPHOCYTES # BLD AUTO: 2.94 K/UL (ref 1–4.8)
LYMPHOCYTES NFR BLD: 23.8 % (ref 22–41)
MAGNESIUM SERPL-MCNC: 1.8 MG/DL (ref 1.5–2.5)
MCH RBC QN AUTO: 29.2 PG (ref 27–33)
MCHC RBC AUTO-ENTMCNC: 32.1 G/DL (ref 32.3–36.5)
MCV RBC AUTO: 90.7 FL (ref 81.4–97.8)
MONOCYTES # BLD AUTO: 0.84 K/UL (ref 0–0.85)
MONOCYTES NFR BLD AUTO: 6.8 % (ref 0–13.4)
NEUTROPHILS # BLD AUTO: 8.34 K/UL (ref 1.82–7.42)
NEUTROPHILS NFR BLD: 67.5 % (ref 44–72)
NRBC # BLD AUTO: 0 K/UL
NRBC BLD-RTO: 0 /100 WBC (ref 0–0.2)
PHOSPHATE SERPL-MCNC: 4 MG/DL (ref 2.5–4.5)
PLATELET # BLD AUTO: 197 K/UL (ref 164–446)
PMV BLD AUTO: 11.2 FL (ref 9–12.9)
POTASSIUM SERPL-SCNC: 4 MMOL/L (ref 3.6–5.5)
POTASSIUM SERPL-SCNC: 4.8 MMOL/L (ref 3.6–5.5)
RBC # BLD AUTO: 4.63 M/UL (ref 4.7–6.1)
SODIUM SERPL-SCNC: 140 MMOL/L (ref 135–145)
SODIUM SERPL-SCNC: 141 MMOL/L (ref 135–145)
SODIUM UR-SCNC: 159 MMOL/L
WBC # BLD AUTO: 12.4 K/UL (ref 4.8–10.8)

## 2024-11-10 PROCEDURE — A9270 NON-COVERED ITEM OR SERVICE: HCPCS | Performed by: STUDENT IN AN ORGANIZED HEALTH CARE EDUCATION/TRAINING PROGRAM

## 2024-11-10 PROCEDURE — 99406 BEHAV CHNG SMOKING 3-10 MIN: CPT | Performed by: HOSPITALIST

## 2024-11-10 PROCEDURE — 36415 COLL VENOUS BLD VENIPUNCTURE: CPT

## 2024-11-10 PROCEDURE — 700102 HCHG RX REV CODE 250 W/ 637 OVERRIDE(OP): Performed by: STUDENT IN AN ORGANIZED HEALTH CARE EDUCATION/TRAINING PROGRAM

## 2024-11-10 PROCEDURE — 80048 BASIC METABOLIC PNL TOTAL CA: CPT

## 2024-11-10 PROCEDURE — 770001 HCHG ROOM/CARE - MED/SURG/GYN PRIV*

## 2024-11-10 PROCEDURE — 700105 HCHG RX REV CODE 258: Performed by: HOSPITALIST

## 2024-11-10 PROCEDURE — 83735 ASSAY OF MAGNESIUM: CPT

## 2024-11-10 PROCEDURE — 87040 BLOOD CULTURE FOR BACTERIA: CPT | Mod: 91

## 2024-11-10 PROCEDURE — 80069 RENAL FUNCTION PANEL: CPT

## 2024-11-10 PROCEDURE — 82570 ASSAY OF URINE CREATININE: CPT

## 2024-11-10 PROCEDURE — 99233 SBSQ HOSP IP/OBS HIGH 50: CPT | Mod: 25 | Performed by: HOSPITALIST

## 2024-11-10 PROCEDURE — 700105 HCHG RX REV CODE 258: Performed by: STUDENT IN AN ORGANIZED HEALTH CARE EDUCATION/TRAINING PROGRAM

## 2024-11-10 PROCEDURE — 85025 COMPLETE CBC W/AUTO DIFF WBC: CPT

## 2024-11-10 PROCEDURE — 51798 US URINE CAPACITY MEASURE: CPT

## 2024-11-10 PROCEDURE — 84300 ASSAY OF URINE SODIUM: CPT

## 2024-11-10 PROCEDURE — 700111 HCHG RX REV CODE 636 W/ 250 OVERRIDE (IP): Performed by: STUDENT IN AN ORGANIZED HEALTH CARE EDUCATION/TRAINING PROGRAM

## 2024-11-10 RX ORDER — POLYETHYLENE GLYCOL 3350 17 G/17G
1 POWDER, FOR SOLUTION ORAL DAILY
Status: DISCONTINUED | OUTPATIENT
Start: 2024-11-11 | End: 2024-11-12 | Stop reason: HOSPADM

## 2024-11-10 RX ORDER — SODIUM CHLORIDE, SODIUM LACTATE, POTASSIUM CHLORIDE, CALCIUM CHLORIDE 600; 310; 30; 20 MG/100ML; MG/100ML; MG/100ML; MG/100ML
INJECTION, SOLUTION INTRAVENOUS CONTINUOUS
Status: ACTIVE | OUTPATIENT
Start: 2024-11-10 | End: 2024-11-11

## 2024-11-10 RX ORDER — AMOXICILLIN 250 MG
2 CAPSULE ORAL NIGHTLY PRN
Status: DISCONTINUED | OUTPATIENT
Start: 2024-11-10 | End: 2024-11-12 | Stop reason: HOSPADM

## 2024-11-10 RX ORDER — SODIUM CHLORIDE, SODIUM LACTATE, POTASSIUM CHLORIDE, AND CALCIUM CHLORIDE .6; .31; .03; .02 G/100ML; G/100ML; G/100ML; G/100ML
1000 INJECTION, SOLUTION INTRAVENOUS ONCE
Status: COMPLETED | OUTPATIENT
Start: 2024-11-10 | End: 2024-11-10

## 2024-11-10 RX ADMIN — SODIUM CHLORIDE: 9 INJECTION, SOLUTION INTRAVENOUS at 04:05

## 2024-11-10 RX ADMIN — SODIUM CHLORIDE, POTASSIUM CHLORIDE, SODIUM LACTATE AND CALCIUM CHLORIDE: 600; 310; 30; 20 INJECTION, SOLUTION INTRAVENOUS at 18:17

## 2024-11-10 RX ADMIN — NICOTINE POLACRILEX 2 MG: 2 GUM, CHEWING BUCCAL at 17:22

## 2024-11-10 RX ADMIN — NICOTINE TRANSDERMAL SYSTEM 21 MG: 21 PATCH, EXTENDED RELEASE TRANSDERMAL at 04:13

## 2024-11-10 RX ADMIN — TRAMADOL HYDROCHLORIDE 50 MG: 50 TABLET ORAL at 14:51

## 2024-11-10 RX ADMIN — CEFAZOLIN 2 G: 10 INJECTION, POWDER, FOR SOLUTION INTRAVENOUS at 14:42

## 2024-11-10 RX ADMIN — NICOTINE POLACRILEX 2 MG: 2 GUM, CHEWING BUCCAL at 04:13

## 2024-11-10 RX ADMIN — SODIUM CHLORIDE, POTASSIUM CHLORIDE, SODIUM LACTATE AND CALCIUM CHLORIDE 1000 ML: 600; 310; 30; 20 INJECTION, SOLUTION INTRAVENOUS at 12:32

## 2024-11-10 RX ADMIN — NICOTINE POLACRILEX 2 MG: 2 GUM, CHEWING BUCCAL at 01:55

## 2024-11-10 RX ADMIN — CEFAZOLIN 2 G: 10 INJECTION, POWDER, FOR SOLUTION INTRAVENOUS at 22:41

## 2024-11-10 RX ADMIN — KETOROLAC TROMETHAMINE 15 MG: 15 INJECTION, SOLUTION INTRAMUSCULAR; INTRAVENOUS at 08:02

## 2024-11-10 RX ADMIN — NICOTINE POLACRILEX 2 MG: 2 GUM, CHEWING BUCCAL at 20:22

## 2024-11-10 RX ADMIN — NICOTINE POLACRILEX 2 MG: 2 GUM, CHEWING BUCCAL at 11:14

## 2024-11-10 RX ADMIN — TRAMADOL HYDROCHLORIDE 50 MG: 50 TABLET ORAL at 19:12

## 2024-11-10 RX ADMIN — ESCITALOPRAM OXALATE 10 MG: 10 TABLET ORAL at 11:14

## 2024-11-10 RX ADMIN — CEFAZOLIN 2 G: 10 INJECTION, POWDER, FOR SOLUTION INTRAVENOUS at 05:08

## 2024-11-10 ASSESSMENT — PAIN DESCRIPTION - PAIN TYPE
TYPE: ACUTE PAIN

## 2024-11-10 ASSESSMENT — ENCOUNTER SYMPTOMS
NERVOUS/ANXIOUS: 1
FLANK PAIN: 1

## 2024-11-10 ASSESSMENT — FIBROSIS 4 INDEX: FIB4 SCORE: 1.21

## 2024-11-10 NOTE — PROGRESS NOTES
"    Urology Progress Note      S: Seen and examined. He reports his pain has drastically improved. No fevers, chills, nausea or vomiting.  Voiding well without difficulty, denies dysuria, frequency, urgency.    O:   /80   Pulse 67   Temp 36.8 °C (98.2 °F) (Temporal)   Resp 18   Ht 1.727 m (5' 8\")   Wt 83.1 kg (183 lb 3.2 oz)   SpO2 98%   Recent Labs     11/09/24  0422 11/10/24  0123   SODIUM 139 141   POTASSIUM 3.7 4.8   CHLORIDE 104 109   CO2 22 25   GLUCOSE 159* 96   BUN 21 24*   CREATININE 1.08 1.85*   CALCIUM 9.9 8.8     Recent Labs     11/09/24  0422 11/10/24  0123   WBC 18.2* 12.4*   RBC 5.37 4.63*   HEMOGLOBIN 15.9 13.5*   HEMATOCRIT 46.9 42.0   MCV 87.3 90.7   MCH 29.6 29.2   MCHC 33.9 32.1*   RDW 39.8 41.8   PLATELETCT 229 197   MPV 10.6 11.2         Intake/Output Summary (Last 24 hours) at 11/10/2024 1054  Last data filed at 11/10/2024 0800  Gross per 24 hour   Intake 1240 ml   Output 0 ml   Net 1240 ml       Exam:  Gen: NAD  Abd: Abdomen soft, no TTP.   : No CVAT     A/P:    Active Hospital Problems    Diagnosis     Hydronephrosis with urinary obstruction due to ureteral calculus [N13.2]     Pulmonary nodules [R91.8]     Acute pyelonephritis [N10]     Tobacco use [Z72.0]      3/4 ppd and vaping      Recurrent major depressive disorder, in remission (HCC) [F33.40]      Managed by Princess Urias @ [facility]         39 year old male admitted with right flank pain, and a 4 mm obstructing right ureteral stone.   Plan:  - monitor pain control  - trend renal function  - Please strain urine. If stone collected, please send for stone analysis.  - pain has improved. I discussed potential ureteroscopy with laser lithotripsy vs observation. He elects to continue to try and pass. We will anticipate arranging a outpatient follow up with a [facility] in 1-2 weeks.   - No acute urologic intervention. Urology signing off.      BERTRAM Fox.A.   [address] [place] [address]   [phone]      "

## 2024-11-10 NOTE — CARE PLAN
The patient is Stable - Low risk of patient condition declining or worsening    Shift Goals  Clinical Goals: NPO, Pain management, Monitor vs  Patient Goals: Sleep  Family Goals: ROCKY    Progress made toward(s) clinical / shift goals:        Problem: Knowledge Deficit - Standard  Goal: Patient and family/care givers will demonstrate understanding of plan of care, disease process/condition, diagnostic tests and medications  Description: Target End Date:  1-3 days or as soon as patient condition allows    Document in Patient Education    1.  Patient and family/caregiver oriented to unit, equipment, visitation policy and means for communicating concern  2.  Complete/review Learning Assessment  3.  Assess knowledge level of disease process/condition, treatment plan, diagnostic tests and medications  4.  Explain disease process/condition, treatment plan, diagnostic tests and medications  Outcome: Progressing  Note: Patient asked questions regarding plan of care. Verbalized understanding.      Problem: Pain - Standard  Goal: Alleviation of pain or a reduction in pain to the patient’s comfort goal  Description: Target End Date:  Prior to discharge or change in level of care    Document on Vitals flowsheet    1.  Document pain using the appropriate pain scale per order or unit policy  2.  Educate and implement non-pharmacologic comfort measures (i.e. relaxation, distraction, massage, cold/heat therapy, etc.)  3.  Pain management medications as ordered  4.  Reassess pain after pain med administration per policy  5.  If opiods administered assess patient's response to pain medication is appropriate per POSS sedation scale  6.  Follow pain management plan developed in collaboration with patient and interdisciplinary team (including palliative care or pain specialists if applicable)  Outcome: Progressing  Note: Patient verbalized understanding regarding pain medication administration.

## 2024-11-10 NOTE — CARE PLAN
The patient is Stable - Low risk of patient condition declining or worsening         Progress made toward(s) clinical / shift goals: pain medications per order, all questions answered, patient state he takes meds per order and states he sees a therapist regularly     Patient is not progressing towards the following goals:

## 2024-11-11 PROBLEM — Z72.0 TOBACCO USE: Status: RESOLVED | Noted: 2022-08-03 | Resolved: 2024-11-11

## 2024-11-11 LAB
ANION GAP SERPL CALC-SCNC: 9 MMOL/L (ref 7–16)
BACTERIA UR CULT: NORMAL
BUN SERPL-MCNC: 16 MG/DL (ref 8–22)
CALCIUM SERPL-MCNC: 8.9 MG/DL (ref 8.5–10.5)
CHLORIDE SERPL-SCNC: 108 MMOL/L (ref 96–112)
CO2 SERPL-SCNC: 22 MMOL/L (ref 20–33)
CREAT SERPL-MCNC: 1.14 MG/DL (ref 0.5–1.4)
ERYTHROCYTE [DISTWIDTH] IN BLOOD BY AUTOMATED COUNT: 41.7 FL (ref 35.9–50)
GFR SERPLBLD CREATININE-BSD FMLA CKD-EPI: 85 ML/MIN/1.73 M 2
GLUCOSE SERPL-MCNC: 94 MG/DL (ref 65–99)
HCT VFR BLD AUTO: 38.8 % (ref 42–52)
HGB BLD-MCNC: 12.8 G/DL (ref 14–18)
MCH RBC QN AUTO: 29.6 PG (ref 27–33)
MCHC RBC AUTO-ENTMCNC: 33 G/DL (ref 32.3–36.5)
MCV RBC AUTO: 89.8 FL (ref 81.4–97.8)
PLATELET # BLD AUTO: 167 K/UL (ref 164–446)
PMV BLD AUTO: 11.5 FL (ref 9–12.9)
POTASSIUM SERPL-SCNC: 4.2 MMOL/L (ref 3.6–5.5)
RBC # BLD AUTO: 4.32 M/UL (ref 4.7–6.1)
SIGNIFICANT IND 70042: NORMAL
SITE SITE: NORMAL
SODIUM SERPL-SCNC: 139 MMOL/L (ref 135–145)
SOURCE SOURCE: NORMAL
WBC # BLD AUTO: 10 K/UL (ref 4.8–10.8)

## 2024-11-11 PROCEDURE — 99233 SBSQ HOSP IP/OBS HIGH 50: CPT | Performed by: HOSPITALIST

## 2024-11-11 PROCEDURE — 700111 HCHG RX REV CODE 636 W/ 250 OVERRIDE (IP): Performed by: STUDENT IN AN ORGANIZED HEALTH CARE EDUCATION/TRAINING PROGRAM

## 2024-11-11 PROCEDURE — A9270 NON-COVERED ITEM OR SERVICE: HCPCS | Performed by: HOSPITALIST

## 2024-11-11 PROCEDURE — 85027 COMPLETE CBC AUTOMATED: CPT

## 2024-11-11 PROCEDURE — 770001 HCHG ROOM/CARE - MED/SURG/GYN PRIV*

## 2024-11-11 PROCEDURE — A9270 NON-COVERED ITEM OR SERVICE: HCPCS | Performed by: STUDENT IN AN ORGANIZED HEALTH CARE EDUCATION/TRAINING PROGRAM

## 2024-11-11 PROCEDURE — 700102 HCHG RX REV CODE 250 W/ 637 OVERRIDE(OP): Performed by: NURSE PRACTITIONER

## 2024-11-11 PROCEDURE — 700102 HCHG RX REV CODE 250 W/ 637 OVERRIDE(OP): Performed by: STUDENT IN AN ORGANIZED HEALTH CARE EDUCATION/TRAINING PROGRAM

## 2024-11-11 PROCEDURE — A9270 NON-COVERED ITEM OR SERVICE: HCPCS | Performed by: NURSE PRACTITIONER

## 2024-11-11 PROCEDURE — 80048 BASIC METABOLIC PNL TOTAL CA: CPT

## 2024-11-11 PROCEDURE — 700105 HCHG RX REV CODE 258: Performed by: STUDENT IN AN ORGANIZED HEALTH CARE EDUCATION/TRAINING PROGRAM

## 2024-11-11 PROCEDURE — 700102 HCHG RX REV CODE 250 W/ 637 OVERRIDE(OP): Performed by: HOSPITALIST

## 2024-11-11 RX ORDER — SULFAMETHOXAZOLE AND TRIMETHOPRIM 800; 160 MG/1; MG/1
1 TABLET ORAL EVERY 12 HOURS
Status: DISCONTINUED | OUTPATIENT
Start: 2024-11-11 | End: 2024-11-12 | Stop reason: HOSPADM

## 2024-11-11 RX ORDER — SODIUM CHLORIDE, SODIUM LACTATE, POTASSIUM CHLORIDE, CALCIUM CHLORIDE 600; 310; 30; 20 MG/100ML; MG/100ML; MG/100ML; MG/100ML
INJECTION, SOLUTION INTRAVENOUS CONTINUOUS
Status: DISCONTINUED | OUTPATIENT
Start: 2024-11-11 | End: 2024-11-11

## 2024-11-11 RX ORDER — POLYETHYLENE GLYCOL 3350 17 G/17G
17 POWDER, FOR SOLUTION ORAL DAILY
Qty: 15 EACH | Refills: 0 | Status: SHIPPED | OUTPATIENT
Start: 2024-11-12 | End: 2024-11-27

## 2024-11-11 RX ORDER — SODIUM CHLORIDE, SODIUM LACTATE, POTASSIUM CHLORIDE, CALCIUM CHLORIDE 600; 310; 30; 20 MG/100ML; MG/100ML; MG/100ML; MG/100ML
INJECTION, SOLUTION INTRAVENOUS CONTINUOUS
Status: DISCONTINUED | OUTPATIENT
Start: 2024-11-12 | End: 2024-11-12 | Stop reason: HOSPADM

## 2024-11-11 RX ORDER — SULFAMETHOXAZOLE AND TRIMETHOPRIM 800; 160 MG/1; MG/1
1 TABLET ORAL EVERY 12 HOURS
Qty: 10 TABLET | Refills: 0 | Status: ACTIVE | OUTPATIENT
Start: 2024-11-11 | End: 2024-11-16

## 2024-11-11 RX ORDER — TRAMADOL HYDROCHLORIDE 50 MG/1
50 TABLET ORAL EVERY 8 HOURS PRN
Qty: 15 TABLET | Refills: 0 | Status: SHIPPED | OUTPATIENT
Start: 2024-11-11 | End: 2024-11-16

## 2024-11-11 RX ORDER — IBUPROFEN 800 MG/1
800 TABLET, FILM COATED ORAL 3 TIMES DAILY
Status: DISCONTINUED | OUTPATIENT
Start: 2024-11-11 | End: 2024-11-11

## 2024-11-11 RX ORDER — KETOROLAC TROMETHAMINE 15 MG/ML
15 INJECTION, SOLUTION INTRAMUSCULAR; INTRAVENOUS EVERY 6 HOURS PRN
Status: DISCONTINUED | OUTPATIENT
Start: 2024-11-11 | End: 2024-11-12 | Stop reason: HOSPADM

## 2024-11-11 RX ADMIN — NICOTINE POLACRILEX 2 MG: 2 GUM, CHEWING BUCCAL at 18:56

## 2024-11-11 RX ADMIN — TRAMADOL HYDROCHLORIDE 50 MG: 50 TABLET ORAL at 05:33

## 2024-11-11 RX ADMIN — NICOTINE POLACRILEX 2 MG: 2 GUM, CHEWING BUCCAL at 22:40

## 2024-11-11 RX ADMIN — SULFAMETHOXAZOLE AND TRIMETHOPRIM 1 TABLET: 800; 160 TABLET ORAL at 11:52

## 2024-11-11 RX ADMIN — TRAMADOL HYDROCHLORIDE 50 MG: 50 TABLET ORAL at 11:13

## 2024-11-11 RX ADMIN — NICOTINE TRANSDERMAL SYSTEM 21 MG: 21 PATCH, EXTENDED RELEASE TRANSDERMAL at 05:19

## 2024-11-11 RX ADMIN — POLYETHYLENE GLYCOL 3350 1 PACKET: 17 POWDER, FOR SOLUTION ORAL at 05:19

## 2024-11-11 RX ADMIN — IBUPROFEN 800 MG: 800 TABLET, FILM COATED ORAL at 11:52

## 2024-11-11 RX ADMIN — CEFAZOLIN 2 G: 10 INJECTION, POWDER, FOR SOLUTION INTRAVENOUS at 05:19

## 2024-11-11 RX ADMIN — ESCITALOPRAM OXALATE 10 MG: 10 TABLET ORAL at 11:23

## 2024-11-11 RX ADMIN — MORPHINE SULFATE 4 MG: 4 INJECTION, SOLUTION INTRAMUSCULAR; INTRAVENOUS at 13:06

## 2024-11-11 RX ADMIN — NICOTINE POLACRILEX 2 MG: 2 GUM, CHEWING BUCCAL at 03:16

## 2024-11-11 RX ADMIN — NICOTINE POLACRILEX 2 MG: 2 GUM, CHEWING BUCCAL at 05:26

## 2024-11-11 ASSESSMENT — PAIN DESCRIPTION - PAIN TYPE
TYPE: ACUTE PAIN

## 2024-11-11 ASSESSMENT — ENCOUNTER SYMPTOMS
HEADACHES: 0
FLANK PAIN: 1
NERVOUS/ANXIOUS: 1

## 2024-11-11 ASSESSMENT — FIBROSIS 4 INDEX: FIB4 SCORE: 1.43

## 2024-11-11 NOTE — PROGRESS NOTES
Hospital Medicine Daily Progress Note    Date of Service  11/10/2024    Chief Complaint  Jarvis Perez is a 39 y.o. male admitted 11/9/2024 with   Chief Complaint   Patient presents with    Flank Pain    N/V         Hospital Course  This is a 39-year-old male with a past medical significant for nicotine dependence, depression presented to the ER on 11/9/2024 with a complaint of flank pain associated with nausea and vomiting.    Presents to the ER, he is hemodynamically stable, WBC count 18.2 CT scan showing 4 mm obstructing stone, urology was consulted.  Cultures x 2 ordered, urine culture ordered, patient was started on antibiotics. Urology came and evaluated the patient, plan for surgical intervention tomorrow.    Interval events:  --Patient is alert, awake, answering question appropriately, vital sign reviewed  -- Blood cX X 2 pending, urine culture pending, patient is starting antibiotics, will continue will follow culture  --Urology evaluated the patient, plan for surgical intervention tomorrow, make n.p.o. at midnight    11/10:  -- Patient is alert  and oriented , has been answering questions appropriately  -- HR 66-75  -- blood cx x 2 pending , urine cx pending   Continue iv ancef 2 Gm Q8  -- CBC and bmp reviewed   Creatinine  at 1.8 , obtain urine na and urine creatinine   Bladder scan   Repeat bmp at am     Plan of care has been discussed with Urology PAc Perez  I have discussed this patient's plan of care and discharge plan at IDT rounds today with Case Management, Nursing, Nursing leadership, and other members of the IDT team.    Consultants/Specialty  urology    Code Status  Full Code    Disposition  Medically Cleared  I have placed the appropriate orders for post-discharge needs.    Review of Systems  Review of Systems   Genitourinary:  Positive for flank pain.   Psychiatric/Behavioral:  The patient is nervous/anxious.         Physical Exam  Temp:  [36.8 °C (98.2 °F)-37.1 °C (98.8 °F)] 37.1 °C  (98.8 °F)  Pulse:  [66-75] 75  Resp:  [18-20] 18  BP: ()/(63-80) 117/71  SpO2:  [97 %-98 %] 98 %    Physical Exam  Vitals and nursing note reviewed.   Constitutional:       Appearance: Normal appearance.   Eyes:      Extraocular Movements: Extraocular movements intact.   Musculoskeletal:      Cervical back: Neck supple.   Neurological:      Mental Status: He is alert and oriented to person, place, and time.      Cranial Nerves: No cranial nerve deficit.         Fluids    Intake/Output Summary (Last 24 hours) at 11/10/2024 1739  Last data filed at 11/10/2024 1459  Gross per 24 hour   Intake 200 ml   Output 1100 ml   Net -900 ml        Laboratory  Recent Labs     11/09/24  0422 11/10/24  0123   WBC 18.2* 12.4*   RBC 5.37 4.63*   HEMOGLOBIN 15.9 13.5*   HEMATOCRIT 46.9 42.0   MCV 87.3 90.7   MCH 29.6 29.2   MCHC 33.9 32.1*   RDW 39.8 41.8   PLATELETCT 229 197   MPV 10.6 11.2     Recent Labs     11/09/24 0422 11/10/24  0123   SODIUM 139 141   POTASSIUM 3.7 4.8   CHLORIDE 104 109   CO2 22 25   GLUCOSE 159* 96   BUN 21 24*   CREATININE 1.08 1.85*   CALCIUM 9.9 8.8                   Imaging  CT-RENAL COLIC EVALUATION(A/P W/O)   Final Result         1.  4 mm mid right ureteral stone resulting in right urinary outflow tract obstructive changes   2.  Ill-defined low-density lesion right hepatic lobe, could represent small hemangioma, otherwise indeterminate. Could be followed up with three-phase CT liver for further characterization.   3.  Pulmonary nodules, see nodule follow-up recommendations below.      Fleischner Society pulmonary nodule recommendations:   Low Risk: No routine follow-up      High Risk: Optional CT at 12 months      Comments: Use most suspicious nodule as guide to management. Follow-up intervals may vary according to size and risk.      Low Risk - Minimal or absent history of smoking and of other known risk factors.      High Risk - History of smoking or of other known risk factors.      Note: These  recommendations do not apply to lung cancer screening, patients with immunosuppression, or patients with known primary cancer.      Fleischner Society 2017 Guidelines for Management of Incidentally Detected Pulmonary Nodules in Adults              Assessment/Plan  * Hydronephrosis with urinary obstruction due to ureteral calculus- (present on admission)  Assessment & Plan  Comes in with right flank pain.  CT scan reviewed and shows a 4 mm obstructing stone  Urology following , appreciatred rec  Continue ivf   Iv abx  Follow blood and urine cx    Acute pyelonephritis- (present on admission)  Assessment & Plan  UA infectious appearing, stone on imaging  Started on IV antibiotics  Follow blood and urine cx      Pulmonary nodules- (present on admission)  Assessment & Plan  Multiple pulmonary nodules.  Patient is a smoker, IP referral  to nodule Clinic Placed    Tobacco use- (present on admission)  Assessment & Plan  Patient smokes more than 1 PPD.  Nicotine patch and/or gum ordered.   I discussed cessation with patient including starting on nicotine patch and/or gum on discharge.  I also discussed medications to help with cessation with patient including Wellbutrin and Chantix, declined as he is on antidepressants.  Smoking cessation discussed with patient for 4 minutes.     Recurrent major depressive disorder, in remission (HCC)- (present on admission)  Assessment & Plan  Continue escitalopram         VTE prophylaxis: scd

## 2024-11-11 NOTE — CARE PLAN
The patient is Stable - Low risk of patient condition declining or worsening    Shift Goals  Clinical Goals: safety, pain management, monitor output,  Patient Goals: pain control, rest, shower  Family Goals: vijay    Progress made toward(s) clinical / shift goals:    Problem: Pain - Standard  Goal: Alleviation of pain or a reduction in pain to the patient’s comfort goal  Description: Target End Date:  Prior to discharge or change in level of care    Document on Vitals flowsheet    1.  Document pain using the appropriate pain scale per order or unit policy  2.  Educate and implement non-pharmacologic comfort measures (i.e. relaxation, distraction, massage, cold/heat therapy, etc.)  3.  Pain management medications as ordered  4.  Reassess pain after pain med administration per policy  5.  If opiods administered assess patient's response to pain medication is appropriate per POSS sedation scale  6.  Follow pain management plan developed in collaboration with patient and interdisciplinary team (including palliative care or pain specialists if applicable)  Outcome: Progressing  Note: Patient using numbers scale to rate pain. Non-pharm techniques used to relieve discomfort. PRNs given appropriately at request       Problem: Knowledge Deficit - Standard  Goal: Patient and family/care givers will demonstrate understanding of plan of care, disease process/condition, diagnostic tests and medications  Description: Target End Date:  1-3 days or as soon as patient condition allows    Document in Patient Education    1.  Patient and family/caregiver oriented to unit, equipment, visitation policy and means for communicating concern  2.  Complete/review Learning Assessment  3.  Assess knowledge level of disease process/condition, treatment plan, diagnostic tests and medications  4.  Explain disease process/condition, treatment plan, diagnostic tests and medications  Outcome: Progressing  Note: POC discussed with pt, all questions  answered at this time.         Patient is not progressing towards the following goals:

## 2024-11-11 NOTE — DOCUMENTATION QUERY
[facility]                                                                       Query Response Note      PATIENT:               BRIAN SAWANT  ACCT #:                  [id number]  MRN:                     [id number]  :                      1985  ADMIT DATE:       2024 4:10 AM  DISCH DATE:          RESPONDING  PROVIDER #:        [id number]           QUERY TEXT:    Labs documented in the Medical Record provide opportunity for further clarification with a creatinine level of 1.08 on admission up to 1.85 on day 2.      Please clarify the clinical/diagnostic relevance for the documented findings.    Note: If you agree with a diagnosis listed above, please remember to include it in your concurrent daily documentation and onto the Discharge Summary.    The patient's clinical indicators include:  Labs: Creatinine : 1.08, 11/10: 1.852, 11/10: 1.66, : 1.14             BUN : 21, 11/10: 24, 11/10: 20, : 16             GFR : 90, 11/10: 47, 11/10: 54, : 85    Risk Factors: Hydronephrosis with urinary obstruction due to ureteral calculus, Acute pyelonephritis    Tx: IV fluids, IV antibiotics    If you have any questions, please contact:  Compa Mora RN CDI [facility]  [email or web address]  Compa Mora [address]  Options provided:   -- Clinically significant and to be documented as Acute Kidney Injury   -- Other explanation, (Please specify the other explanation)   -- Findings of no clinical significance      Query created by: Compa Mora on 2024 9:15 AM    RESPONSE TEXT:    Clinically significant and to be documented as Acute Kidney Injury          Electronically signed by:  JULIO CÉSAR CHRISTIANSON 2024 2:15 PM

## 2024-11-11 NOTE — CARE PLAN
Problem: Pain - Standard  Goal: Alleviation of pain or a reduction in pain to the patient’s comfort goal  Outcome: Progressing     Problem: Knowledge Deficit - Standard  Goal: Patient and family/care givers will demonstrate understanding of plan of care, disease process/condition, diagnostic tests and medications  Outcome: Progressing     Problem: Depression  Goal: Patient and family/caregiver will verbalize accurate information about at least two of the possible causes of depression, three-four of the signs and symptoms of depression  Outcome: Progressing     Problem: Psychosocial  Goal: Patient's level of anxiety will decrease  Outcome: Progressing  Goal: Patient's ability to verbalize feelings about condition will improve  Outcome: Progressing  Goal: Patient's ability to re-evaluate and adapt role responsibilities will improve  Outcome: Progressing  Goal: Patient and family will demonstrate ability to cope with life altering diagnosis and/or procedure  Outcome: Progressing  Goal: Spiritual and cultural needs incorporated into hospitalization  Outcome: Progressing     Problem: Communication  Goal: The ability to communicate needs accurately and effectively will improve  Outcome: Progressing     Problem: Discharge Barriers/Planning  Goal: Patient's continuum of care needs are met  Outcome: Progressing     Problem: Urinary Elimination  Goal: Establish and maintain regular urinary output  Outcome: Progressing   The patient is Stable - Low risk of patient condition declining or worsening    Shift Goals  Clinical Goals: Discharge  Patient Goals: Discharge  Family Goals: N/A    Progress made toward(s) clinical / shift goals:  Patient to remain overnight this shift, Procedure 11/12, Patient voiding per Flowsheets, Pain medicated per NELA,  Educated patient on plan of care this shift, patient verbalized understanding     Patient is not progressing towards the following goals:

## 2024-11-11 NOTE — CARE PLAN
The patient is Stable - Low risk of patient condition declining or worsening    Shift Goals  Clinical Goals: Pain managment, monitor urine output, potential surgery  Patient Goals: Pain managment, procedure  Family Goals: No family present      Problem: Pain - Standard  Goal: Alleviation of pain or a reduction in pain to the patient’s comfort goal  Description: Target End Date:  Prior to discharge or change in level of care    Document on Vitals flowsheet    1.  Document pain using the appropriate pain scale per order or unit policy  2.  Educate and implement non-pharmacologic comfort measures (i.e. relaxation, distraction, massage, cold/heat therapy, etc.)  3.  Pain management medications as ordered  4.  Reassess pain after pain med administration per policy  5.  If opiods administered assess patient's response to pain medication is appropriate per POSS sedation scale  6.  Follow pain management plan developed in collaboration with patient and interdisciplinary team (including palliative care or pain specialists if applicable)  Outcome: Progressing  Note: Pts pain managed with PRN medications per MAR.      Problem: Knowledge Deficit - Standard  Goal: Patient and family/care givers will demonstrate understanding of plan of care, disease process/condition, diagnostic tests and medications  Description: Target End Date:  1-3 days or as soon as patient condition allows    Document in Patient Education    1.  Patient and family/caregiver oriented to unit, equipment, visitation policy and means for communicating concern  2.  Complete/review Learning Assessment  3.  Assess knowledge level of disease process/condition, treatment plan, diagnostic tests and medications  4.  Explain disease process/condition, treatment plan, diagnostic tests and medications  Outcome: Progressing  Note: Patient up to date in regard to plan of care, all questions answered accordingly. Verbalizes understanding, compliant with interventions.         Progress made toward(s) clinical / shift goals:      Patient is not progressing towards the following goals:

## 2024-11-11 NOTE — PROGRESS NOTES
[facility] Medicine Daily Progress Note    Date of Service  11/11/2024    Chief Complaint  Jarvis Perez is a 39 y.o. male admitted 11/9/2024 with   Chief Complaint   Patient presents with    Flank Pain    N/V         Hospital Course  This is a 39-year-old male with a past medical significant for nicotine dependence, depression presented to the ER on 11/9/2024 with a complaint of flank pain associated with nausea and vomiting.    Presents to the ER, he is hemodynamically stable, WBC count 18.2 CT scan showing 4 mm obstructing stone, urology was consulted.  Cultures x 2 ordered, urine culture ordered, patient was started on antibiotics. Urology came and evaluated the patient, he stated patient declined for procedure..  Today patient reports that his pain is severe and wanted requested, contacted Dr. Mills of Urology, will make npo at midnight,  patient will be going for surgical intervention tomorrow.    Patient is here presenting to ED with GORAN, which improved with IV fluid resuscitation.  In the ED consistent with UTI, urine culture no growth to date.  Patient reports feeling better on Ancef, switched to Bactrim after discussing with ID pharmacist    Interval events:  Patient is alert, awake, answering questions reviewed, vitals has been stable, heart rate 67, blood pressure 90/49, saturating 100% on room air.  Patient reports continued improvement after IV fluid resuscitation, encourage patient to drink plenty of p.o. intake.  -- Patient stated significant amount of the pain, will need to have procedure done, recommended radiology, discussed with Dr. Mills, will make n.p.o. at midnight, patient is appropriate surgical intervention tomorrow  -- I discussed with ID pharmacist, patient already switched to Bactrim.  Will continue for total of 7 days    Plan of care has been discussed with the nursing staff and urology Dr. Mills    I have discussed this patient's plan of care and discharge plan at IDT  rounds today with Case Management, Nursing, Nursing leadership, and other members of the IDT team.    Consultants/Specialty  urology    Code Status  Full Code    Disposition  The patient is not medically cleared for discharge to home or a post-acute facility.  Anticipate discharge to: home with close outpatient follow-up    I have placed the appropriate orders for post-discharge needs.    Review of Systems  Review of Systems   Genitourinary:  Positive for flank pain.   Neurological:  Negative for headaches.   Psychiatric/Behavioral:  The patient is nervous/anxious.         Physical Exam  Temp:  [36.6 °C (97.8 °F)-37.1 °C (98.8 °F)] 36.8 °C (98.2 °F)  Pulse:  [63-77] 63  Resp:  [16-18] 16  BP: ()/(49-79) 90/49  SpO2:  [95 %-98 %] 95 %    Physical Exam  Vitals and nursing note reviewed.   Constitutional:       Appearance: Normal appearance.   Eyes:      Extraocular Movements: Extraocular movements intact.   Cardiovascular:      Rate and Rhythm: Normal rate.   Abdominal:      Palpations: Abdomen is soft.   Musculoskeletal:      Cervical back: Neck supple.      Right lower leg: No edema.      Left lower leg: No edema.   Neurological:      Mental Status: He is alert and oriented to person, place, and time.      Cranial Nerves: No cranial nerve deficit.         Fluids    Intake/Output Summary (Last 24 hours) at 11/11/2024 1531  Last data filed at 11/11/2024 0958  Gross per 24 hour   Intake 1220.62 ml   Output 800 ml   Net 420.62 ml        Laboratory  Recent Labs     11/09/24  0422 11/10/24  0123 11/11/24  0225   WBC 18.2* 12.4* 10.0   RBC 5.37 4.63* 4.32*   HEMOGLOBIN 15.9 13.5* 12.8*   HEMATOCRIT 46.9 42.0 38.8*   MCV 87.3 90.7 89.8   MCH 29.6 29.2 29.6   MCHC 33.9 32.1* 33.0   RDW 39.8 41.8 41.7   PLATELETCT 229 197 167   MPV 10.6 11.2 11.5     Recent Labs     11/10/24  0123 11/10/24  1802 11/11/24  0225   SODIUM 141 140 139   POTASSIUM 4.8 4.0 4.2   CHLORIDE 109 108 108   CO2 25 23 22   GLUCOSE 96 96 94   BUN 24*  20 16   CREATININE 1.85* 1.66* 1.14   CALCIUM 8.8 8.6 8.9                   Imaging  CT-RENAL COLIC EVALUATION(A/P W/O)   Final Result         1.  4 mm mid right ureteral stone resulting in right urinary outflow tract obstructive changes   2.  Ill-defined low-density lesion right hepatic lobe, could represent small hemangioma, otherwise indeterminate. Could be followed up with three-phase CT liver for further characterization.   3.  Pulmonary nodules, see nodule follow-up recommendations below.      Fleischner Society pulmonary nodule recommendations:   Low Risk: No routine follow-up      High Risk: Optional CT at 12 months      Comments: Use most suspicious nodule as guide to management. Follow-up intervals may vary according to size and risk.      Low Risk - Minimal or absent history of smoking and of other known risk factors.      High Risk - History of smoking or of other known risk factors.      Note: These recommendations do not apply to lung cancer screening, patients with immunosuppression, or patients with known primary cancer.      Fleischner Society 2017 Guidelines for Management of Incidentally Detected Pulmonary Nodules in Adults              Assessment/Plan  * Hydronephrosis with urinary obstruction due to ureteral calculus- (present on admission)  Assessment & Plan  Comes in with right flank pain.  CT scan reviewed and shows a 4 mm obstructing stone  Discussed with urology Dr. Mills, patient will be going for surgical intervention tomorrow.    N.p.o. at midnight    GORAN (acute kidney injury) (HCC)  Assessment & Plan  Patient, likely secondary to continue etiology, encouraged p.o. intake of fluid    Acute pyelonephritis- (present on admission)  Assessment & Plan  UA infectious appearing, stone on imaging  Started on IV antibiotics  Follow blood and urine cx      Pulmonary nodules- (present on admission)  Assessment & Plan  Multiple pulmonary nodules.  Patient is a smoker, IP referral  to nodule  Clinic Placed    Recurrent major depressive disorder, in remission (HCC)- (present on admission)  Assessment & Plan  Continue escitalopram         VTE prophylaxis: scd    I have performed a physical exam and reviewed and updated ROS and Plan today (11/11/2024). In review of yesterday's note (11/10/2024), there are no changes except as documented above.

## 2024-11-11 NOTE — PROGRESS NOTES
Assumed care of patient at 0645. Bedside report received.   Assessment complete.  AA&Ox4. Denies CP/SOB.  Reporting 0/10 pain. Declined intervention at this time.  Educated patient regarding pharmacologic and non pharmacologic modalities for pain management.  Skin per flowsheets  Tolerating Regular diet. Denies N/V.  + void. Last BM 11/9, PTA   Pt ambulates Independently.  All needs met at this time. Call light within reach. Pt calls appropriately. Bed low and locked, non skid socks in place. Hourly rounding in place.

## 2024-11-12 ENCOUNTER — APPOINTMENT (OUTPATIENT)
Dept: RADIOLOGY | Facility: MEDICAL CENTER | Age: 39
End: 2024-11-12
Attending: UROLOGY
Payer: MEDICAID

## 2024-11-12 VITALS
OXYGEN SATURATION: 95 % | WEIGHT: 185.85 LBS | DIASTOLIC BLOOD PRESSURE: 63 MMHG | BODY MASS INDEX: 28.17 KG/M2 | HEART RATE: 60 BPM | HEIGHT: 68 IN | SYSTOLIC BLOOD PRESSURE: 109 MMHG | TEMPERATURE: 97.2 F | RESPIRATION RATE: 18 BRPM

## 2024-11-12 PROBLEM — F17.200 SMOKER: Status: ACTIVE | Noted: 2024-11-12

## 2024-11-12 LAB
ALBUMIN SERPL BCP-MCNC: 3.7 G/DL (ref 3.2–4.9)
BUN SERPL-MCNC: 12 MG/DL (ref 8–22)
CALCIUM ALBUM COR SERPL-MCNC: 9.3 MG/DL (ref 8.5–10.5)
CALCIUM SERPL-MCNC: 9.1 MG/DL (ref 8.5–10.5)
CHLORIDE SERPL-SCNC: 107 MMOL/L (ref 96–112)
CO2 SERPL-SCNC: 27 MMOL/L (ref 20–33)
CREAT SERPL-MCNC: 0.98 MG/DL (ref 0.5–1.4)
ERYTHROCYTE [DISTWIDTH] IN BLOOD BY AUTOMATED COUNT: 41.9 FL (ref 35.9–50)
GFR SERPLBLD CREATININE-BSD FMLA CKD-EPI: 101 ML/MIN/1.73 M 2
GLUCOSE SERPL-MCNC: 89 MG/DL (ref 65–99)
HCT VFR BLD AUTO: 40.5 % (ref 42–52)
HGB BLD-MCNC: 13.4 G/DL (ref 14–18)
MCH RBC QN AUTO: 29.8 PG (ref 27–33)
MCHC RBC AUTO-ENTMCNC: 33.1 G/DL (ref 32.3–36.5)
MCV RBC AUTO: 90.2 FL (ref 81.4–97.8)
PHOSPHATE SERPL-MCNC: 2.8 MG/DL (ref 2.5–4.5)
PLATELET # BLD AUTO: 167 K/UL (ref 164–446)
PMV BLD AUTO: 11.3 FL (ref 9–12.9)
POTASSIUM SERPL-SCNC: 4.5 MMOL/L (ref 3.6–5.5)
RBC # BLD AUTO: 4.49 M/UL (ref 4.7–6.1)
SODIUM SERPL-SCNC: 143 MMOL/L (ref 135–145)
WBC # BLD AUTO: 9.9 K/UL (ref 4.8–10.8)

## 2024-11-12 PROCEDURE — 85027 COMPLETE CBC AUTOMATED: CPT

## 2024-11-12 PROCEDURE — 700102 HCHG RX REV CODE 250 W/ 637 OVERRIDE(OP): Performed by: STUDENT IN AN ORGANIZED HEALTH CARE EDUCATION/TRAINING PROGRAM

## 2024-11-12 PROCEDURE — 80069 RENAL FUNCTION PANEL: CPT

## 2024-11-12 PROCEDURE — A9270 NON-COVERED ITEM OR SERVICE: HCPCS | Performed by: HOSPITALIST

## 2024-11-12 PROCEDURE — 700105 HCHG RX REV CODE 258: Performed by: HOSPITALIST

## 2024-11-12 PROCEDURE — 700102 HCHG RX REV CODE 250 W/ 637 OVERRIDE(OP): Performed by: HOSPITALIST

## 2024-11-12 PROCEDURE — 99239 HOSP IP/OBS DSCHRG MGMT >30: CPT | Performed by: STUDENT IN AN ORGANIZED HEALTH CARE EDUCATION/TRAINING PROGRAM

## 2024-11-12 PROCEDURE — A9270 NON-COVERED ITEM OR SERVICE: HCPCS | Performed by: STUDENT IN AN ORGANIZED HEALTH CARE EDUCATION/TRAINING PROGRAM

## 2024-11-12 RX ADMIN — NICOTINE TRANSDERMAL SYSTEM 21 MG: 21 PATCH, EXTENDED RELEASE TRANSDERMAL at 06:08

## 2024-11-12 RX ADMIN — SULFAMETHOXAZOLE AND TRIMETHOPRIM 1 TABLET: 800; 160 TABLET ORAL at 06:08

## 2024-11-12 RX ADMIN — NICOTINE POLACRILEX 2 MG: 2 GUM, CHEWING BUCCAL at 01:34

## 2024-11-12 ASSESSMENT — ENCOUNTER SYMPTOMS
HEADACHES: 0
NERVOUS/ANXIOUS: 1
FLANK PAIN: 1

## 2024-11-12 NOTE — PROGRESS NOTES
Pt states that they passed kidney stone while voiding in the restroom. Sample caught in strainer. In addition pt IV infiltrated while receiving LR 100mls/hr. Pt unsure if they would like to proceed with surgery and is refusing a new IV until a decision about care is made. Pt is A&O x4 and currently has no IV access. Educated pt on the importance of having an IV. MD aware.

## 2024-11-12 NOTE — PROGRESS NOTES
[facility] Discussed discharge paperwork and medications with patient and family. Answered all questions. No home meds to return. Patient escorted out, personal belongings in hand.

## 2024-11-12 NOTE — CARE PLAN
The patient is Stable - Low risk of patient condition declining or worsening    Shift Goals  Clinical Goals: Pass kindney stone, manage pain  Patient Goals: discharge, comfort  Family Goals: updates    Progress made toward(s) clinical / shift goals:        Problem: Pain - Standard  Goal: Alleviation of pain or a reduction in pain to the patient’s comfort goal  Outcome: Progressing  Flowsheets (Taken 11/12/2024 3904)  Pain Rating Scale (NPRS): 0  Note: Pt states no pain at this moment     Problem: Knowledge Deficit - Standard  Goal: Patient and family/care givers will demonstrate understanding of plan of care, disease process/condition, diagnostic tests and medications  Outcome: Progressing  Note: Educated pt on POC. All questions and concerns answered at this moment.        Problem: Urinary Elimination  Goal: Establish and maintain regular urinary output  Outcome: Progressing  Note: Encourage pt to drink fluids and void. Educated pt on urine collection devices. Pt states no pain when voiding.Pt passed kidney stone. MD aware

## 2024-11-12 NOTE — DISCHARGE INSTRUCTIONS
Follow-up ultrasound within the next month and follow-up with urology [place] after obtained  If develop recurrent pain, fever/chills, nausea/vomiting or other recurrent or worsening symptoms then return to the ED  Complete your course of antibiotics

## 2024-11-12 NOTE — PROGRESS NOTES
Over the phone, discussed patient with Boyd Jones - pt reports passed kidney stone last night, stone captured (will be sent for stone analysis). We were planning on ureteroscopy procedure later today, and this will be cancelled. I rec'd ultrasound to visualize the hydronephrosis on respective side has improved. This can be done in house prior to discharge, but if needed we can do this as an outpatient at [facility]. I would rec'd 6 week ultrasound at [facility] with follow up with physician assistant.

## 2024-11-12 NOTE — DISCHARGE PLANNING
Met with pt who reports he is independent with ADLs and IADLs. Lives with mom . ( Mom was at bedside).    PCP is Dr. Merle Martin but pt stated that he goes to [facility] so his PCP changes. Pharmacy is [facility].     Care Transition Team Assessment    Information Source  Orientation Level: Oriented X4  Information Given By: Patient  Who is responsible for making decisions for patient? : Patient    Readmission Evaluation  Is this a readmission?: No    Elopement Risk  Legal Hold: No  Ambulatory or Self Mobile in Wheelchair: Yes  Disoriented: No  Psychiatric Symptoms: None  History of Wandering: No  Elopement this Admit: No  Vocalizing Wanting to Leave: No  Displays Behaviors, Body Language Wanting to Leave: No-Not at Risk for Elopement  Elopement Risk: Not at Risk for Elopement    Interdisciplinary Discharge Planning  Does Admitting Nurse Feel This Could be a Complex Discharge?: No  Primary Care Physician: Dr Merle Martin  Lives with - Patient's Self Care Capacity: Parents  Patient or legal guardian wants to designate a caregiver: No  Support Systems: Family Member(s)  Housing / Facility: [address]  Do You Take your Prescribed Medications Regularly: Yes  Able to Return to Previous ADL's: Yes  Mobility Issues: No  Prior Services: Home-Independent  Patient Prefers to be Discharged to:: Home  Assistance Needed: No    Discharge Preparedness  What is your plan after discharge?: Home with help  What are your discharge supports?: Parent  Prior Functional Level: Ambulatory, Drives Self, Independent with Activities of Daily Living, Independent with Medication Management  Difficulity with ADLs: None  Difficulity with IADLs: None    Functional Assesment  Prior Functional Level: Ambulatory, Drives Self, Independent with Activities of Daily Living, Independent with Medication Management    Finances  Financial Barriers to Discharge: No  Prescription Coverage: Yes    Vision / Hearing Impairment  Vision Impairment :  Yes  Right Eye Vision: Impaired, Wears Glasses  Left Eye Vision: Impaired, Wears Glasses  Hearing Impairment : No    Values / Beliefs / Concerns  Values / Beliefs Concerns : No    Advance Directive  Advance Directive?: None    Domestic Abuse  Have you ever been the victim of abuse or violence?: No  Possible Abuse/Neglect Reported to:: Not Applicable    Psychological Assessment  History of Substance Abuse: None    Discharge Risks or Barriers  Discharge risks or barriers?: No  Patient risk factors: Other (comment)    Anticipated Discharge Information  Discharge Disposition: Discharged to home/self care (01)

## 2024-11-12 NOTE — DISCHARGE SUMMARY
Discharge Summary    CHIEF COMPLAINT ON ADMISSION  Chief Complaint   Patient presents with    Flank Pain    N/V       Reason for Admission  N/V; Back Pain     Admission Date  11/9/2024    CODE STATUS  Full Code    HPI & HOSPITAL COURSE  This is a 39-year-old male with a past medical significant for nicotine dependence, depression presented to the ER on 11/9/2024 with a complaint of flank pain associated with nausea and vomiting.    He presented to the ER, was hemodynamically stable, WBC count 18.2 CT scan showing 4 mm obstructing stone, urology was consulted.  Cultures x 2 ordered, urine culture ordered, patient was started on antibiotics.  Urology was consulted and evaluated the patient.  He initially opted to attempt to allow the stone to pass spontaneously which did around midnight on 11/12.  He was evaluated later that morning and he felt much improved and was eager for discharge.  He did not wish to pursue any further surgical intervention.  I discussed with urology, initially would have preferred to have follow-up ultrasound prior to discharge however patient was eager for discharge.  He is established with urology [place] and they will arrange for follow-up, I have placed order for outpatient ultrasound to follow-up hydronephrosis.  The kidney stone that was passed was sent to the lab for analysis.  He was given a prescription of Bactrim to complete a total of 7 days of antibiotic therapy.    Therefore, he is discharged in good and stable condition to home with close outpatient follow-up.    The patient met 2-midnight criteria for an inpatient stay at the time of discharge.    Discharge Date  11/12/2024    FOLLOW UP ITEMS POST DISCHARGE  Follow-up renal ultrasound and follow-up with urology    DISCHARGE DIAGNOSES  Principal Problem:    Hydronephrosis with urinary obstruction due to ureteral calculus (POA: Yes)  Active Problems:    Recurrent major depressive disorder, in remission (HCC) (POA: Yes)       Overview: Managed by Princess Urias @ Dr. [facility]on [facility]    Pulmonary nodules (POA: Yes)    Acute pyelonephritis (POA: Yes)    GORAN (acute kidney injury) (HCC) (POA: Unknown)    Smoker (POA: Unknown)  Resolved Problems:    Tobacco use (POA: Yes)      Overview: 3/4 ppd and vaping      FOLLOW UP  No future appointments.  No follow-up provider specified.    MEDICATIONS ON DISCHARGE     Medication List        START taking these medications        Instructions   polyethylene glycol/lytes Pack  Commonly known as: Miralax   Take 1 Packet by mouth every day for 15 days.  Dose: 17 g     sulfamethoxazole-trimethoprim 800-160 MG tablet  Commonly known as: Bactrim DS   Take 1 Tablet by mouth every 12 hours for 5 days.  Dose: 1 Tablet     traMADol 50 MG Tabs  Commonly known as: Ultram   Take 1 Tablet by mouth every 8 hours as needed for Severe Pain for up to 5 days.  Dose: 50 mg            CONTINUE taking these medications        Instructions   cyanocobalamin 100 MCG Tabs  Commonly known as: Vitamin B-12   Take 100 mcg by mouth every day.  Dose: 100 mcg     escitalopram 20 MG tablet  Commonly known as: Lexapro   Take 0.5-1 Tablets by mouth every day.  Dose: 10-20 mg     One-A-Day Mens Tabs   Take 1 Tablet by mouth every day.  Dose: 1 Tablet     vitamin D2 (Ergocalciferol) 1.25 MG (82757 UT) Caps capsule  Commonly known as: Drisdol   Take 1 Capsule by mouth every 7 days.  Dose: 50,000 Units            ASK your doctor about these medications        Instructions   nicotine 21 MG/24HR Pt24  Commonly known as: Nicoderm   Place 1 Patch on the skin every 24 hours.  Dose: 1 Patch     nicotine polacrilex 2 MG Gum  Commonly known as: Nicorette   Take 1 Each by mouth 4 times a day as needed for Smoking Cessation (for breakthrough cravings).  Dose: 2 mg              Allergies  Allergies   Allergen Reactions    Food      Other reaction(s): hives, breathing difficulties    Penicillins Hives       DIET  Orders Placed This Encounter    Procedures    Diet NPO Restrict to: Sips with Medications     Standing Status:   Standing     Number of Occurrences:   8     Order Specific Question:   Diet NPO Restrict to:     Answer:   Sips with Medications [3]       ACTIVITY  As tolerated.  Weight bearing as tolerated    CONSULTATIONS  Urology    PROCEDURES      LABORATORY  Lab Results   Component Value Date    SODIUM 143 11/12/2024    POTASSIUM 4.5 11/12/2024    CHLORIDE 107 11/12/2024    CO2 27 11/12/2024    GLUCOSE 89 11/12/2024    BUN 12 11/12/2024    CREATININE 0.98 11/12/2024        Lab Results   Component Value Date    WBC 9.9 11/12/2024    HEMOGLOBIN 13.4 (L) 11/12/2024    HEMATOCRIT 40.5 (L) 11/12/2024    PLATELETCT 167 11/12/2024        Total time of the discharge process exceeds 44 minutes.

## 2024-11-12 NOTE — PROGRESS NOTES
Hospital Medicine Daily Progress Note    Date of Service  11/12/2024    Chief Complaint  Jarvis Perez is a 39 y.o. male admitted 11/9/2024 with   Chief Complaint   Patient presents with    Flank Pain    N/V         Hospital Course  This is a 39-year-old male with a past medical significant for nicotine dependence, depression presented to the ER on 11/9/2024 with a complaint of flank pain associated with nausea and vomiting.    Presents to the ER, he is hemodynamically stable, WBC count 18.2 CT scan showing 4 mm obstructing stone, urology was consulted.  Cultures x 2 ordered, urine culture ordered, patient was started on antibiotics. Urology came and evaluated the patient, he stated patient declined for procedure..  Today patient reports that his pain is severe and wanted requested, contacted Dr. Mills of Urology, will make npo at midnight,  patient will be going for surgical intervention tomorrow.    Patient is here presenting to ED with GORAN, which improved with IV fluid resuscitation.  In the ED consistent with UTI, urine culture no growth to date.  Patient reports feeling better on Ancef, switched to Bactrim after discussing with ID pharmacist    Interval events:  11/12  No acute events overnight, plan for urology intervention today, patient is n.p.o. this morning  Vital signs stable, labs unremarkable  Antibiotics switched to Bactrim for total of 7 days      Plan of care has been discussed with the nursing staff and urology Dr. Mills    I have discussed this patient's plan of care and discharge plan at IDT rounds today with Case Management, Nursing, Nursing leadership, and other members of the IDT team.    Consultants/Specialty  urology    Code Status  Full Code    Disposition  Likely home after urology procedure if everything remaining stable  I have placed the appropriate orders for post-discharge needs.    Review of Systems  Review of Systems   Genitourinary:  Positive for flank pain.    Neurological:  Negative for headaches.   Psychiatric/Behavioral:  The patient is nervous/anxious.         Physical Exam  Temp:  [36.1 °C (97 °F)-36.8 °C (98.2 °F)] 36.2 °C (97.2 °F)  Pulse:  [56-63] 60  Resp:  [16-18] 18  BP: ()/(49-72) 109/63  SpO2:  [93 %-95 %] 95 %    Physical Exam  Vitals and nursing note reviewed.   Constitutional:       Appearance: Normal appearance.   Eyes:      Extraocular Movements: Extraocular movements intact.   Cardiovascular:      Rate and Rhythm: Normal rate.   Abdominal:      Palpations: Abdomen is soft.   Musculoskeletal:      Cervical back: Neck supple.      Right lower leg: No edema.      Left lower leg: No edema.   Neurological:      Mental Status: He is alert and oriented to person, place, and time.      Cranial Nerves: No cranial nerve deficit.         Fluids    Intake/Output Summary (Last 24 hours) at 11/12/2024 0751  Last data filed at 11/12/2024 0000  Gross per 24 hour   Intake 0 ml   Output 800 ml   Net -800 ml        Laboratory  Recent Labs     11/10/24  0123 11/11/24  0225 11/12/24  0259   WBC 12.4* 10.0 9.9   RBC 4.63* 4.32* 4.49*   HEMOGLOBIN 13.5* 12.8* 13.4*   HEMATOCRIT 42.0 38.8* 40.5*   MCV 90.7 89.8 90.2   MCH 29.2 29.6 29.8   MCHC 32.1* 33.0 33.1   RDW 41.8 41.7 41.9   PLATELETCT 197 167 167   MPV 11.2 11.5 11.3     Recent Labs     11/10/24  1802 11/11/24  0225 11/12/24  0259   SODIUM 140 139 143   POTASSIUM 4.0 4.2 4.5   CHLORIDE 108 108 107   CO2 23 22 27   GLUCOSE 96 94 89   BUN 20 16 12   CREATININE 1.66* 1.14 0.98   CALCIUM 8.6 8.9 9.1                   Imaging  CT-RENAL COLIC EVALUATION(A/P W/O)   Final Result         1.  4 mm mid right ureteral stone resulting in right urinary outflow tract obstructive changes   2.  Ill-defined low-density lesion right hepatic lobe, could represent small hemangioma, otherwise indeterminate. Could be followed up with three-phase CT liver for further characterization.   3.  Pulmonary nodules, see nodule follow-up  recommendations below.      Fleischner Society pulmonary nodule recommendations:   Low Risk: No routine follow-up      High Risk: Optional CT at 12 months      Comments: Use most suspicious nodule as guide to management. Follow-up intervals may vary according to size and risk.      Low Risk - Minimal or absent history of smoking and of other known risk factors.      High Risk - History of smoking or of other known risk factors.      Note: These recommendations do not apply to lung cancer screening, patients with immunosuppression, or patients with known primary cancer.      Fleischner Society 2017 Guidelines for Management of Incidentally Detected Pulmonary Nodules in Adults         DX-PORTABLE FLUOROSCOPY < 1 HOUR Reason For Exam: Main OR    (Results Pending)        Assessment/Plan  * Hydronephrosis with urinary obstruction due to ureteral calculus- (present on admission)  Assessment & Plan  Comes in with right flank pain.  CT scan reviewed and shows a 4 mm obstructing stone  Discussed with urology Dr. Mills, patient will be going for surgical intervention tomorrow.    N.p.o. at midnight    GORAN (acute kidney injury) (HCC)  Assessment & Plan  Patient, likely secondary to continue etiology, encouraged p.o. intake of fluid    Acute pyelonephritis- (present on admission)  Assessment & Plan  UA infectious appearing, stone on imaging  Blood cx X 2 and Urine cx x 2 pending    -- was on ancef and now switched to bactrim    Pulmonary nodules- (present on admission)  Assessment & Plan  Multiple pulmonary nodules.  Patient is a smoker, IP referral  to nodule Clinic Placed    Recurrent major depressive disorder, in remission (HCC)- (present on admission)  Assessment & Plan  Continue escitalopram         VTE prophylaxis: scd    I have performed a physical exam and reviewed and updated ROS and Plan today (11/12/2024). In review of yesterday's note (11/11/2024), there are no changes except as documented above.

## 2024-11-15 LAB
BACTERIA BLD CULT: NORMAL
BACTERIA BLD CULT: NORMAL
SIGNIFICANT IND 70042: NORMAL
SIGNIFICANT IND 70042: NORMAL
SITE SITE: NORMAL
SITE SITE: NORMAL
SOURCE SOURCE: NORMAL
SOURCE SOURCE: NORMAL

## 2024-11-18 ENCOUNTER — APPOINTMENT (OUTPATIENT)
Dept: SLEEP MEDICINE | Facility: MEDICAL CENTER | Age: 39
End: 2024-11-18
Attending: STUDENT IN AN ORGANIZED HEALTH CARE EDUCATION/TRAINING PROGRAM
Payer: MEDICAID

## 2024-11-18 NOTE — PROGRESS NOTES
"Subjective:     CC:  There were no encounter diagnoses.    HISTORY OF THE PRESENT ILLNESS: Patient is a 39 y.o. male. This pleasant patient is here today to establish care in the Lung Nodule Clinic and discuss pulmonary nodules. His referring provider is Dr. Torrey Estes and his PCP is Dr. Merle Martin.    Pulmonary nodules- Jarvis is a 39M smoker with a *** pack year history presenting for evaluation of pulmonary nodules incidentally noted on CT renal 11/9/24.  He had the CT on 11/9/24 due to having flank pain seconary to a renal stone and was noted to have, \"2.9 mm right middle lobe pulmonary nodule is seen on image 1. 2.9 mm right middle lobe pulmonary nodule is seen on image 11. 3.5 mm right lower lobe pulmonary nodule is seen on image 23. 2.5 mm left lower lobe pulmonary nodule on image 8.\"  The only other CT on file was a CT abd from 12/30/22 which showed a \"subpleural 3-4mm right lower lobe pulmonary nodule.\"    History of smoking- ***  History of second hand tobacco smoke exposure- ***  Occupational history- ***  Living location history- ***  Exposures to dust, fumes, solvents, gases, tuberculosis, radon, chemicals, radiation, or asbestos  - ***  History of hospitalization for respiratory problems or been on a ventilator, such as asthma, pneumonia, emphysema, or COPD - ***  History of a cough or hemoptysis - ***  Sputum that is colored or malodorous - ***  Shortness of breath - ***  Weight loss- ***      Allergies: Food and Penicillins    Current Outpatient Medications Ordered in Epic   Medication Sig Dispense Refill    polyethylene glycol/lytes (MIRALAX) Pack Take 1 Packet by mouth every day for 15 days. 15 Each 0    Multiple Vitamin (ONE-A-DAY MENS) Tab Take 1 Tablet by mouth every day.      cyanocobalamin (VITAMIN B-12) 100 MCG Tab Take 100 mcg by mouth every day.      nicotine (NICODERM) 21 MG/24HR PATCH 24 HR Place 1 Patch on the skin every 24 hours. (Patient not taking: Reported on 11/9/2024) 30 Patch 1 "    nicotine polacrilex (NICORETTE) 2 MG Gum Take 1 Each by mouth 4 times a day as needed for Smoking Cessation (for breakthrough cravings). (Patient not taking: Reported on 11/9/2024) 150 Each 1    vitamin D2, Ergocalciferol, (DRISDOL) 1.25 MG (80736 UT) Cap capsule Take 1 Capsule by mouth every 7 days. 5 Capsule 1    escitalopram (LEXAPRO) 20 MG tablet Take 0.5-1 Tablets by mouth every day. 30 Tablet 1     No current Epic-ordered facility-administered medications on file.       Past Medical History:   Diagnosis Date    Asthma     Herpesvirus infection 12/13/2018    Psychiatric disorder     anxiety,  OCD       No past surgical history on file.    Social History     Tobacco Use    Smoking status: Every Day     Current packs/day: 0.50     Types: Cigarettes    Smokeless tobacco: Never    Tobacco comments:     3/4 of a pack a day- trying to quit     5/7/24 - 1 cigarette weekly      10/16 - has not smoked for the past month    Vaping Use    Vaping status: Former    Substances: Nicotine, Flavoring    Devices: Disposable   Substance Use Topics    Alcohol use: Not Currently    Drug use: No       Social History     Social History Narrative    Not on file       Family History   Problem Relation Age of Onset    No Known Problems Mother     Coronary artery disease Father     Stroke Father     Stroke Paternal Grandmother     Stroke Paternal Grandfather        Health Maintenance: {COMPLETED:426030}    ROS:   Gen: no fevers/chills, no changes in weight  Eyes: no changes in vision  ENT: no sore throat, no hearing loss, no bloody nose  Pulm: no sob, no cough  CV: no chest pain, no palpitations  GI: no nausea/vomiting, no diarrhea  : no dysuria  MSk: no myalgias  Skin: no rash  Neuro: no headaches, no numbness/tingling  Heme/Lymph: no easy bruising      Objective:     ***  Exam: There were no vitals taken for this visit. There is no height or weight on file to calculate BMI.    General: Normal appearing. No distress.  HEENT:  Normocephalic.  Canals are clear bilaterally, tympanic membranes are benign, nasal mucosa benign, oropharynx is without erythema, edema or exudates.   Eyes: Eyes conjunctiva clear lids without ptosis, pupils equal and reactive to light accommodation, ears normal shape and contour  Neck: Supple without JVD or bruit. Thyroid is not enlarged.  Pulmonary: Clear to ausculation.  Normal effort. No rales, ronchi, or wheezing.  Cardiovascular: Regular rate and rhythm without murmur. Carotid and radial pulses are intact and equal bilaterally.  Abdomen: Soft, nontender, nondistended. Normal bowel sounds. Liver and spleen are not palpable  Neurologic: No gross motor deficits  Lymph: No cervical or supraclavicular lymph nodes are palpable  Skin: Warm and dry.  No obvious lesions.  Musculoskeletal: Normal gait. No extremity cyanosis, clubbing, or edema.  Psych: Normal mood and affect. Alert and oriented x3. Judgment and insight is normal.  ***    A chaperone was offered to the patient during today's exam. {CHAPERONE:72473}    Labs: ***    Assessment & Plan:   39 y.o. male with the following -    There are no diagnoses linked to this encounter.    No follow-ups on file.    Please note that this dictation was created using voice recognition software. I have made every reasonable attempt to correct obvious errors, but I expect that there are errors of grammar and possibly content that I did not discover before finalizing the note.

## 2024-11-24 NOTE — PROGRESS NOTES
"Subjective:     CC:  The encounter diagnosis was Pulmonary nodules.    HISTORY OF THE PRESENT ILLNESS: Patient is a 39 y.o. male. This pleasant patient is here today to establish care in the Lung Nodule Clinic and discuss pulmonary nodules. His referring provider is Dr. Torrey Estes and his PCP is Dr. Merle Martin.    Pulmonary nodules- Jarvis is a 39M smoker with a 11 pack year history presenting for evaluation of pulmonary nodules incidentally noted on CT renal 11/9/24.  He had the CT on 11/9/24 due to having flank pain seconary to a renal stone and was noted to have, \"2.9 mm right middle lobe pulmonary nodule is seen on image 1. 2.9 mm right middle lobe pulmonary nodule is seen on image 11. 3.5 mm right lower lobe pulmonary nodule is seen on image 23. 2.5 mm left lower lobe pulmonary nodule on image 8.\"  The only other CT on file was a CT abd from 12/30/22 which showed a \"subpleural 3-4mm right lower lobe pulmonary nodule.\"    He plans to quit smoking in 1 week. His paternal grandmother has asthma but no other family hx of lung disease.  He has no family hx of cancer. He briefly vaped for about 1 month and is no longer vaping.  He has had significant second hand tobacco smoke exposure.  He is a .  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  He has lived in [place], [place], and [place]. He has no significant exposure to dust, fumes, solvents, gases, chemicals, radiation, radon, asbestos, or TB.  He has never been hospitalized for respiratory problems or been on a ventilator.  He has no personal hx of asthma, pneumonia, emphysema, or COPD.  He was very ill in March 2024 with RSV and has felt fatigued since that time.  He sometimes has dizziness and difficulty concentrating since that time.    No fevers, chills, night sweats, abnormal weight loss.  He had a cough about 3 months ago, but the cough has " been reducing.  The cough is productive in the AM with mild yellow-green phlegm.  No hemoptysis.  No SOB.  He rarely has a mild wheeze.  No significant chest discomfort.  He has palpitations which he attributes to anxiety.  He has rare nausea after eating but no vomiting (except when he had a kidney stone).  He is stooling normally since hospital discharge.  No abnormal bleeding.  No recent travel.      Allergies: Food and Penicillins    Current Outpatient Medications Ordered in Epic   Medication Sig Dispense Refill    polyethylene glycol/lytes (MIRALAX) Pack Take 1 Packet by mouth every day for 15 days. 15 Each 0    Multiple Vitamin (ONE-A-DAY MENS) Tab Take 1 Tablet by mouth every day.      cyanocobalamin (VITAMIN B-12) 100 MCG Tab Take 100 mcg by mouth every day.      vitamin D2, Ergocalciferol, (DRISDOL) 1.25 MG (79944 UT) Cap capsule Take 1 Capsule by mouth every 7 days. 5 Capsule 1    escitalopram (LEXAPRO) 20 MG tablet Take 0.5-1 Tablets by mouth every day. 30 Tablet 1    nicotine (NICODERM) 21 MG/24HR PATCH 24 HR Place 1 Patch on the skin every 24 hours. (Patient not taking: Reported on 11/9/2024) 30 Patch 1    nicotine polacrilex (NICORETTE) 2 MG Gum Take 1 Each by mouth 4 times a day as needed for Smoking Cessation (for breakthrough cravings). (Patient not taking: Reported on 11/9/2024) 150 Each 1     No current [facility]-ordered facility-administered medications on file.       Past Medical History:   Diagnosis Date    Asthma     Herpesvirus infection 12/13/2018    Psychiatric disorder     anxiety,  OCD       History reviewed. No pertinent surgical history.    Social History     Tobacco Use    Smoking status: Every Day     Current packs/day: 0.75     Average packs/day: 0.8 packs/day for 14.9 years (11.2 ttl pk-yrs)     Types: Cigarettes     Start date: 2008     Last attempt to quit: 2012    Smokeless tobacco: Never    Tobacco comments:     3/4 of a pack a day- trying to quit     5/7/24 - 1 cigarette weekly       "10/16 - has not smoked for the past month    Vaping Use    Vaping status: Former    Substances: Nicotine, Flavoring    Devices: Disposable   Substance Use Topics    Alcohol use: Not Currently    Drug use: No       Social History     Social History Narrative    Not on file       Family History   Problem Relation Age of Onset    No Known Problems Mother     Coronary artery disease Father     Stroke Father     Lung Disease Maternal Grandmother         asthma    Stroke Paternal Grandmother     Stroke Paternal Grandfather     Cancer Neg Hx        Health Maintenance: declines flu vaccine today    ROS:   Gen: no changes in weight  ENT: no bloody nose  Pulm: no sob, no cough  CV: per hpi  GI: no nausea/vomiting, no diarrhea  : no dysuria  MSk: no myalgias  Skin: no rash  Neuro: no headaches, no numbness/tingling  Heme/Lymph: no abnormal bleeding      Objective:       Exam: /70 (BP Location: Right arm, Patient Position: Sitting, BP Cuff Size: Adult)   Pulse 90   Resp 15   Ht 1.753 m (5' 9\")   Wt 83.9 kg (185 lb)   SpO2 97%  Body mass index is 27.32 kg/m².    General: Normal appearing. No distress.  HEENT: Normocephalic.    Eyes: Eyes conjunctiva clear lids without ptosis  Neck: Supple. Thyroid is not enlarged.  Pulmonary: Clear to ausculation.  Normal effort. No rales, ronchi, or wheezing.  Cardiovascular: Regular rate and rhythm without murmur.   Abdomen: Soft, nontender, nondistended. Normal bowel sounds.   Neurologic: No gross motor deficits  Lymph: No cervical or supraclavicular lymph nodes are palpable  Skin: Warm and dry.  No obvious lesions.  Musculoskeletal: No extremity cyanosis, clubbing, or edema.  Psych: Normal mood and affect. Alert and oriented. Judgment and insight is normal.      Assessment & Plan:   39 y.o. male with the following -    1. Pulmonary nodules  Newly noted, no acute or worsening symptoms.  He has a 2.9mm right middle lobe nodule that was incidentally noted on CT-renal 11/9/24.  He " has a second 2.9mm right middle lobe nodule, 3.5mm right lower lobe nodule and a 2.5mm left lower lobe nodule that have been stable from CT abd 12/30/22- CT renal 11/9/24. We discussed that the 2.9mm right middle lobe nodule was outside the field of view on the CT abdomen 12/30/22.  We discussed that lung nodules can be due to inflammation, scarring, infection, mass/malignancy.  Given the size of his lung nodules, they are likely benign (not cancerous).  The stable lung nodules have been behaving benign.  We discussed the risk of radiation, Jarvis 2017 recommendations for a follow-up CT in 1 year for lung nodules less than 6mm to ensure 2 years of stability and that he does not have a CT of his chest for full evaluation of his lung fields.  He opted to proceed with a follow-up CT chest in 6 months (around 5/2024) since he does not have a full CT chest in the past with plan for serial monitoring to ensure at least 2 years of stability of all his nodules given his smoking history.  Encouraged tobacco cessation and avoidance of tobacco products.  Encouraged him to avoid lung irritants.  Follow-up and red flag precautions given. Patient expressed understanding and agreement with plan.  - CT-CHEST (THORAX) W/O; Future      Return in about 6 months (around 5/25/2025) for follow-up lung nodules after CT obtained.    29 minutes was spent face-to-face and chart reviewing for this encounter    Please note that this dictation was created using voice recognition software. I have made every reasonable attempt to correct obvious errors, but I expect that there are errors of grammar and possibly content that I did not discover before finalizing the note.

## 2024-11-25 ENCOUNTER — OFFICE VISIT (OUTPATIENT)
Dept: SLEEP MEDICINE | Facility: MEDICAL CENTER | Age: 39
End: 2024-11-25
Attending: STUDENT IN AN ORGANIZED HEALTH CARE EDUCATION/TRAINING PROGRAM
Payer: MEDICAID

## 2024-11-25 VITALS
BODY MASS INDEX: 27.4 KG/M2 | HEART RATE: 90 BPM | SYSTOLIC BLOOD PRESSURE: 100 MMHG | OXYGEN SATURATION: 97 % | HEIGHT: 69 IN | DIASTOLIC BLOOD PRESSURE: 70 MMHG | RESPIRATION RATE: 15 BRPM | WEIGHT: 185 LBS

## 2024-11-25 DIAGNOSIS — R91.8 PULMONARY NODULES: ICD-10-CM

## 2024-11-25 PROCEDURE — 3078F DIAST BP <80 MM HG: CPT | Performed by: FAMILY MEDICINE

## 2024-11-25 PROCEDURE — 99212 OFFICE O/P EST SF 10 MIN: CPT | Performed by: STUDENT IN AN ORGANIZED HEALTH CARE EDUCATION/TRAINING PROGRAM

## 2024-11-25 PROCEDURE — 99213 OFFICE O/P EST LOW 20 MIN: CPT | Performed by: FAMILY MEDICINE

## 2024-11-25 PROCEDURE — 3074F SYST BP LT 130 MM HG: CPT | Performed by: FAMILY MEDICINE

## 2024-11-25 ASSESSMENT — FIBROSIS 4 INDEX: FIB4 SCORE: 1.43

## 2024-11-25 NOTE — Clinical Note
It was a pleasure seeing Jarvis in the Lung Nodule Clinic today.  I have attached the encounter note for your review. -Dr. Fior Hill

## 2024-12-18 ENCOUNTER — OFFICE VISIT (OUTPATIENT)
Dept: INTERNAL MEDICINE | Facility: OTHER | Age: 39
End: 2024-12-18
Payer: MEDICAID

## 2024-12-18 VITALS
WEIGHT: 186.6 LBS | BODY MASS INDEX: 28.28 KG/M2 | DIASTOLIC BLOOD PRESSURE: 74 MMHG | HEART RATE: 78 BPM | SYSTOLIC BLOOD PRESSURE: 108 MMHG | HEIGHT: 68 IN | OXYGEN SATURATION: 98 % | TEMPERATURE: 98.2 F

## 2024-12-18 DIAGNOSIS — N10 ACUTE PYELONEPHRITIS: ICD-10-CM

## 2024-12-18 DIAGNOSIS — Z72.51 HIGH RISK HETEROSEXUAL BEHAVIOR: ICD-10-CM

## 2024-12-18 DIAGNOSIS — K76.9 LIVER LESION: ICD-10-CM

## 2024-12-18 PROCEDURE — 99214 OFFICE O/P EST MOD 30 MIN: CPT | Mod: GC

## 2024-12-18 PROCEDURE — 3078F DIAST BP <80 MM HG: CPT | Mod: GC

## 2024-12-18 PROCEDURE — 3074F SYST BP LT 130 MM HG: CPT | Mod: GC

## 2024-12-18 ASSESSMENT — FIBROSIS 4 INDEX: FIB4 SCORE: 1.43

## 2024-12-18 NOTE — PROGRESS NOTES
Last Seen: 11/4/2024     Patient Care Team:  Merle Martin M.D. as PCP - General (Internal Medicine)    Chief Complaint   Patient presents with    Hospital Follow-up     Kidney stones hospitalized at [facility]. Passed kidney stone     History of Present Illness:   Jarvis Perez is a 39 y.o. male with PMH as below who presents for:   1. High risk heterosexual behavior    2. Acute pyelonephritis    3. Liver lesion        #Post-Hospitalization Concerns  The patient reports being hospitalized for a kidney stone and an associated infection. They felt well upon discharge but have since experienced a decline in energy levels. The patient is concerned about the possibility of a lingering infection. They mention having completed a course of Bactrim after discharge from the hospital.They note having occasional back pain that is not specifically flank pain.  Patient denies any acute back pain, flulike symptoms, urinary symptoms or new complaints since leaving hospital.  He has renal ultrasound and urology follow-up scheduled.    #Liver Lesion  The patient reports that a recent CT scan showed a lesion on the liver. They are seeking advice on whether to pursue further imaging or specialist consultation for this finding.    #Sexual Health  The patient requests an STI screening, noting a history of herpes and chlamydia (treated over ten years ago). They report having multiple female sexual partners in the past six months, with inconsistent use of protection.  He is asymptomatic with no rash, discharge, urinary symptoms, erectile dysfunction, swelling.    Review of Systems  Constitutional: Positive for fatigue. Negative for chills, fever, and malaise.  HEENT: Negative for congestion, nosebleeds, and sore throat.  Respiratory: Negative for cough, hemoptysis, sputum production, and shortness of breath.  Cardiovascular: Negative for chest pain, palpitations, and leg swelling.  Gastrointestinal: Negative for abdominal  pain, blood in stool, constipation, diarrhea, melena, nausea, and vomiting.  Genitourinary: Negative for dysuria, urgency, and urinary frequency. Reports a history of hematuria but not observed recently.  Musculoskeletal: Positive for back pain. Negative for falls and joint pain.  Skin: Negative for rash.  Neurological: Negative for dizziness, headaches, focal weakness, and loss of consciousness.  Psychiatric/Behavioral: Negative for depression and suicidal ideas.  All other systems reviewed and are negative.      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    Incidental pulmonary nodule    Urethral stricture    Hypnagogic jerks    Light sensitivity    Vitamin D deficiency    Chronic fatigue    Hydronephrosis with urinary obstruction due to ureteral calculus    Pulmonary nodules    Acute pyelonephritis    GORAN (acute kidney injury) ([facility])    Smoker       Medications:     Current Outpatient Medications:     One-A-Day Mens, 1 Tablet, Oral, DAILY, Taking    cyanocobalamin, 100 mcg, Oral, DAILY, Taking    nicotine, 1 Patch, Transdermal, Q24HRS, Taking    nicotine polacrilex, 2 mg, Oral, 4X/DAY PRN, Taking    vitamin D2 (Ergocalciferol), 50,000 Units, Oral, Q7 DAYS, Taking    escitalopram, 10-20 mg, Oral, QDAY, Taking     Social History:  Social History     Tobacco Use    Smoking status: Every Day     Current packs/day: 0.75     Average packs/day: 0.8 packs/day  "for 15.0 years (11.2 ttl pk-yrs)     Types: Cigarettes     Start date: 2008     Last attempt to quit: 2012    Smokeless tobacco: Never    Tobacco comments:     3/4 of a pack a day- trying to quit     5/7/24 - 1 cigarette weekly      10/16 - has not smoked for the past month      12/18-smokes one cigarette once a day   Vaping Use    Vaping status: Former    Substances: Nicotine, Flavoring    Devices: Disposable   Substance Use Topics    Alcohol use: Not Currently    Drug use: No       Objective:  Vitals:   /74 (BP Location: Left arm, Patient Position: Sitting, BP Cuff Size: Adult)   Pulse 78   Temp 36.8 °C (98.2 °F) (Temporal)   Ht 1.727 m (5' 8\")   Wt 84.6 kg (186 lb 9.6 oz)   SpO2 98%  Body mass index is 28.37 kg/m².    PHYSICAL EXAM  General: Alert and oriented, in no acute distress, generally well-appearing.  HEENT: Normocephalic, atraumatic, no palpable lymphadenopathy or throat abnormalities noted.  Cardiovascular: Regular rate and rhythm, no murmurs or gallops.  Respiratory: No respiratory distress, breath sounds clear to auscultation bilaterally.  Abdominal: Soft, non-tender, non-distended, no organomegaly, normal bowel sounds. No CVA tenderness  Musculoskeletal: No deformity or swelling. Reports chronic back pain without acute changes.  Skin: No lesion or rash.  Neurological: No focal deficits, alert and oriented x4.  Psychiatric: Appropriate mood and affect.      Assessment and Plan:    39 y.o. male with:     1. High risk heterosexual behavior  Multiple female partners without consistent protection over past 6 months, patient requests STI panel  - Discuss safe sexual practices and prevention strategies  -STI panel as below:  - Chlamydia/GC, PCR (Urine); Future  - HIV AG/AB Combo Assay Screening; Future  - T.Pallidum AB MELONY (Screening); Future  - Trichomonas Vaginalis by TMA; Future  - Hepatitis C Virus Antibody; Future  - HEP B Surface Antibody; Future  - Hep B Core AB Total; Future  - Hep B " Surface Antigen; Future    2. Acute pyelonephritis  Follow-up after hospitalization and passing of stone, status post Bactrim 7-day course which she completed  -Patient advised to go to scheduled renal ultrasound tomorrow as well as urology appointment  -Advised patient to go back to ER with any return of acute symptoms    3. Liver lesion  Patient CT with noted liver lesion, suspected to be small hemangioma, with recommendation for follow-up scan he has no known risk factors for hepatocellular malignancy or abscess.  - Recommend follow-up with primary care physician for monitoring  -Discussed with patient and will defer triple phase CT scan of the liver for now and revisit at follow-up appointment      Follow Up:  Return in about 4 weeks (around 1/15/2025) for Dr. Jing Martin.    Ferny Osorio MD  Internal Medicine PGY-2  [facility]

## 2024-12-19 NOTE — PROGRESS NOTES
Teaching Physician Attestation      Level of Participation    I have personally interviewed and examined the patient.  In addition, I discussed with the resident physician the patient's history, exam, assessment and plan in detail.  Topics listed in my addendum were the focus of the visit.  Healthcare maintenance was not addressed this visit unless listed as a topic in my addendum.  I agree with the plan as written along with the following additions/modifications:      Nephrolithiasis   -per documentation passed stone  -Since hospitalization, occasional mild flank pain but no new or worsening symptoms, no dysuria or hematuria.  No CVA tenderness on exam, afebrile, nontoxic    Plan  -Upcoming ultrasound and urology follow-up, appreciate urology support, defer further mgmt.      Sti panel today, no discharge reported.  Offered to  on safe sex practices, patient platelet declines    Return to clinic 1 month f/u pcp for liver lesion and pulmonary nodules in setting of tobacco use (incidentally discovered).  Also has stool FIT test pending from prior visit.

## 2024-12-19 NOTE — PATIENT INSTRUCTIONS
-complete renal US  -followup with Urology  -complete labwork  -return to meet with PCP to discuss labwork and liver imaging

## 2024-12-29 DIAGNOSIS — E55.9 VITAMIN D DEFICIENCY: ICD-10-CM

## 2024-12-29 DIAGNOSIS — Z72.0 TOBACCO ABUSE: ICD-10-CM

## 2024-12-30 NOTE — TELEPHONE ENCOUNTER
Received request via: Patient    Was the patient seen in the last year in this department? Yes    Does the patient have an active prescription (recently filled or refills available) for medication(s) requested? No    Pharmacy Name:   To be filled at: [facility]/pharmacy #[id number] - [place], [place] - [address] in [place]          Does the patient have nursing home Plus and need 100-day supply? (This applies to ALL medications) Patient does not have SCP

## 2025-01-07 RX ORDER — ERGOCALCIFEROL 1.25 MG/1
50000 CAPSULE, LIQUID FILLED ORAL
Qty: 5 CAPSULE | Refills: 1 | Status: SHIPPED | OUTPATIENT
Start: 2025-01-07

## 2025-01-07 RX ORDER — NICOTINE 21 MG/24HR
1 PATCH, TRANSDERMAL 24 HOURS TRANSDERMAL EVERY 24 HOURS
Qty: 30 PATCH | Refills: 1 | Status: SHIPPED | OUTPATIENT
Start: 2025-01-07

## 2025-02-19 ENCOUNTER — APPOINTMENT (OUTPATIENT)
Dept: INTERNAL MEDICINE | Facility: OTHER | Age: 40
End: 2025-02-19
Payer: MEDICAID

## 2025-03-04 NOTE — TELEPHONE ENCOUNTER
Received request via: Pharmacy    Was the patient seen in the last year in this department? Yes    Does the patient have an active prescription (recently filled or refills available) for medication(s) requested? No    Pharmacy Name: [facility]/pharmacy #[id number] - [place] - [address] in [place]      Does the patient have FDC Plus and need 100-day supply? (This applies to ALL medications) Patient does not have SCP

## 2025-03-05 RX ORDER — ESCITALOPRAM OXALATE 20 MG/1
10-20 TABLET ORAL
Qty: 90 TABLET | Refills: 1 | Status: SHIPPED | OUTPATIENT
Start: 2025-03-05

## 2025-04-21 ENCOUNTER — APPOINTMENT (OUTPATIENT)
Dept: INTERNAL MEDICINE | Facility: OTHER | Age: 40
End: 2025-04-21
Payer: MEDICAID

## 2025-04-25 ENCOUNTER — OFFICE VISIT (OUTPATIENT)
Dept: INTERNAL MEDICINE | Facility: OTHER | Age: 40
End: 2025-04-25
Payer: MEDICAID

## 2025-04-25 VITALS
TEMPERATURE: 98.8 F | SYSTOLIC BLOOD PRESSURE: 119 MMHG | HEIGHT: 68 IN | BODY MASS INDEX: 27.83 KG/M2 | WEIGHT: 183.6 LBS | OXYGEN SATURATION: 99 % | DIASTOLIC BLOOD PRESSURE: 83 MMHG | HEART RATE: 77 BPM

## 2025-04-25 DIAGNOSIS — R31.9 HEMATURIA, UNSPECIFIED TYPE: ICD-10-CM

## 2025-04-25 DIAGNOSIS — R53.83 OTHER FATIGUE: ICD-10-CM

## 2025-04-25 DIAGNOSIS — G47.30 SLEEP APNEA, UNSPECIFIED TYPE: ICD-10-CM

## 2025-04-25 DIAGNOSIS — G47.00 INSOMNIA, UNSPECIFIED TYPE: ICD-10-CM

## 2025-04-25 DIAGNOSIS — R07.89 CHEST DISCOMFORT: ICD-10-CM

## 2025-04-25 ASSESSMENT — ENCOUNTER SYMPTOMS
ABDOMINAL PAIN: 1
HEADACHES: 1
NAUSEA: 1
PALPITATIONS: 1
TINGLING: 1
BLURRED VISION: 1
WEIGHT LOSS: 1
CHILLS: 1

## 2025-04-25 ASSESSMENT — FIBROSIS 4 INDEX: FIB4 SCORE: 1.47

## 2025-04-25 ASSESSMENT — PATIENT HEALTH QUESTIONNAIRE - PHQ9: CLINICAL INTERPRETATION OF PHQ2 SCORE: 0

## 2025-04-25 NOTE — PROGRESS NOTES
History of Present Illness:   Jarvis Perez is a 40 y.o. male who presents with constellation of symptoms.  He states that the symptoms started in March and then subsided in November when he was treated for a kidney stone.  He came back in March of this year.  He reports symptoms of chest/epigastric discomfort that he describes as a tightness.  He also states that he gets winded very easily especially when he carries groceries.  He also has right sided tension headache along with vision changes such as spots in his eyes.  He has tingling skin sensation in his arms that he describes as someone pinching him.  He also reports numbness in his feet.  He feels like fainting all the time and sometimes has the chills.  He also mentions that he has noticed a little bit of weight loss about 5 pounds in the past couple of months.  2 days ago, he states that when he was sitting down watching TV he felt really nauseous and that his heart was pounding.  He notes his stools have been lemuel colored and have a little dent in them.      Past Medical History:   Diagnosis Date    Asthma     Herpesvirus infection 12/13/2018    Psychiatric disorder     anxiety,  OCD       No past surgical history on file.    Family History   Problem Relation Age of Onset    No Known Problems Mother     Coronary artery disease Father     Stroke Father     Lung Disease Maternal Grandmother         asthma    Stroke Paternal Grandmother     Stroke Paternal Grandfather     Cancer Neg Hx        Social History     Socioeconomic History    Marital status: Single     Spouse name: Not on file    Number of children: Not on file    Years of education: Not on file    Highest education level: Bachelor's degree (e.g., BA, AB, BS)   Occupational History    Not on file   Tobacco Use    Smoking status: Former     Current packs/day: 0.75     Average packs/day: 0.7 packs/day for 15.3 years (11.5 ttl pk-yrs)     Types: Cigarettes     Start date: 2008     Quit  date: 2012    Smokeless tobacco: Never    Tobacco comments:     3/4 of a pack a day- trying to quit     5/7/24 - 1 cigarette weekly      10/16 - has not smoked for the past month      12/18-smokes one cigarette once a day   Vaping Use    Vaping status: Every Day    Substances: Nicotine, Flavoring    Devices: Disposable   Substance and Sexual Activity    Alcohol use: Not Currently    Drug use: No    Sexual activity: Not on file   Other Topics Concern    Not on file   Social History Narrative    Not on file     Social Drivers of Health     Financial Resource Strain: Low Risk  (8/3/2022)    Overall Financial Resource Strain (CARDIA)     Difficulty of Paying Living Expenses: Not very hard   Food Insecurity: No Food Insecurity (11/9/2024)    Hunger Vital Sign     Worried About Running Out of Food in the Last Year: Never true     Ran Out of Food in the Last Year: Never true   Transportation Needs: No Transportation Needs (11/9/2024)    PRAPARE - Transportation     Lack of Transportation (Medical): No     Lack of Transportation (Non-Medical): No   Physical Activity: Sufficiently Active (8/3/2022)    Exercise Vital Sign     Days of Exercise per Week: 4 days     Minutes of Exercise per Session: 60 min   Stress: Stress Concern Present (8/3/2022)    [employer] [place] [employer] - Occupational Stress Questionnaire     Feeling of Stress : To some extent   Social Connections: Socially Isolated (8/3/2022)    Social Connection and Isolation Panel [NHANES]     Frequency of Communication with Friends and Family: More than three times a week     Frequency of Social Gatherings with Friends and Family: More than three times a week     Attends Rastafari Services: Never     Active Member of Clubs or Organizations: No     Attends Club or Organization Meetings: Never     Marital Status: Never    Intimate Partner Violence: Not At Risk (11/9/2024)    Humiliation, Afraid, Rape, and Kick questionnaire     Fear of Current or  Ex-Partner: No     Emotionally Abused: No     Physically Abused: No     Sexually Abused: No   Housing Stability: Low Risk  (11/9/2024)    Housing Stability Vital Sign     Unable to Pay for Housing in the Last Year: No     Number of Times Moved in the Last Year: 0     Homeless in the Last Year: No       Current Outpatient Medications   Medication Sig Dispense Refill    escitalopram (LEXAPRO) 20 MG tablet TAKE 0.5-1 TABLETS BY MOUTH EVERY DAY. 90 Tablet 1    nicotine (NICODERM) 21 MG/24HR PATCH 24 HR Place 1 Patch on the skin every 24 hours. (Patient not taking: Reported on 4/25/2025) 30 Patch 1    nicotine polacrilex (NICORETTE) 2 MG Gum Take 1 Each by mouth 4 times a day as needed for Smoking Cessation (for breakthrough cravings). (Patient not taking: Reported on 4/25/2025) 150 Each 1    vitamin D2, Ergocalciferol, (DRISDOL) 1.25 MG (63693 UT) Cap capsule Take 1 Capsule by mouth every 7 days. (Patient not taking: Reported on 4/25/2025) 5 Capsule 1    Multiple Vitamin (ONE-A-DAY MENS) Tab Take 1 Tablet by mouth every day. (Patient not taking: Reported on 4/25/2025)      cyanocobalamin (VITAMIN B-12) 100 MCG Tab Take 100 mcg by mouth every day. (Patient not taking: Reported on 4/25/2025)       No current facility-administered medications for this visit.       Allergies   Allergen Reactions    Food      Other reaction(s): hives, breathing difficulties, pecans    Penicillins Hives       Review of Systems:  Review of Systems   Constitutional:  Positive for chills, malaise/fatigue and weight loss.   Eyes:  Positive for blurred vision.   Cardiovascular:  Positive for chest pain and palpitations.   Gastrointestinal:  Positive for abdominal pain and nausea.   Genitourinary:  Negative for dysuria.   Neurological:  Positive for tingling and headaches.        Medications:     Current Outpatient Medications:     escitalopram, 10-20 mg, Oral, QDAY, Taking    nicotine, 1 Patch, Transdermal, Q24HRS (Patient not taking: Reported on  "4/25/2025), Not Taking    nicotine polacrilex, 2 mg, Oral, 4X/DAY PRN (Patient not taking: Reported on 4/25/2025), Not Taking    vitamin D2 (Ergocalciferol), 50,000 Units, Oral, Q7 DAYS (Patient not taking: Reported on 4/25/2025), Not Taking    One-A-Day Mens, 1 Tablet, Oral, DAILY (Patient not taking: Reported on 4/25/2025), Not Taking    cyanocobalamin, 100 mcg, Oral, DAILY (Patient not taking: Reported on 4/25/2025), Not Taking     Objective:  Vitals:   /83 (BP Location: Right arm, Patient Position: Sitting, BP Cuff Size: Adult)   Pulse 77   Temp 37.1 °C (98.8 °F) (Temporal)   Ht 1.727 m (5' 8\")   Wt 83.3 kg (183 lb 9.6 oz)   SpO2 99%  Body mass index is 27.92 kg/m².    Physical Exam  Constitutional:       General: He is not in acute distress.  HENT:      Head: Normocephalic and atraumatic.   Eyes:      Conjunctiva/sclera: Conjunctivae normal.   Cardiovascular:      Heart sounds: Normal heart sounds.   Pulmonary:      Breath sounds: Normal breath sounds.   Abdominal:      General: There is no distension.   Neurological:      General: No focal deficit present.      Mental Status: He is oriented to person, place, and time.          Results:    Lab Results   Component Value Date/Time    CHOLSTRLTOT 148 09/15/2022 03:37 PM    LDL 84 09/15/2022 03:37 PM    HDL 48 09/15/2022 03:37 PM    TRIGLYCERIDE 82 09/15/2022 03:37 PM       Lab Results   Component Value Date/Time    SODIUM 143 11/12/2024 02:59 AM    POTASSIUM 4.5 11/12/2024 02:59 AM    CHLORIDE 107 11/12/2024 02:59 AM    CO2 27 11/12/2024 02:59 AM    GLUCOSE 89 11/12/2024 02:59 AM    BUN 12 11/12/2024 02:59 AM    CREATININE 0.98 11/12/2024 02:59 AM     Lab Results   Component Value Date/Time    ALKPHOSPHAT 100 (H) 11/09/2024 04:22 AM    ASTSGOT 25 11/09/2024 04:22 AM    ALTSGPT 15 11/09/2024 04:22 AM    TBILIRUBIN 0.3 11/09/2024 04:22 AM        Lab Results   Component Value Date/Time    WBC 9.9 11/12/2024 02:59 AM    RBC 4.49 (L) 11/12/2024 02:59 AM    " HEMOGLOBIN 13.4 (L) 11/12/2024 02:59 AM    HEMATOCRIT 40.5 (L) 11/12/2024 02:59 AM    MCV 90.2 11/12/2024 02:59 AM    MCH 29.8 11/12/2024 02:59 AM    MCHC 33.1 11/12/2024 02:59 AM    MPV 11.3 11/12/2024 02:59 AM    NEUTSPOLYS 67.50 11/10/2024 01:23 AM    LYMPHOCYTES 23.80 11/10/2024 01:23 AM    MONOCYTES 6.80 11/10/2024 01:23 AM    EOSINOPHILS 1.10 11/10/2024 01:23 AM    BASOPHILS 0.50 11/10/2024 01:23 AM        Assessment and Plan:    #Fatigue  Chronic, gets winded especially when carrying groceries  Vitamin D was low at 24 last year.  TSH and vitamin B12/folate were normal.  He was slightly anemic on his CBC but this was during his hospital stay so likely dilutional  He has a constellation of other symptoms including chest/epigastric discomfort, nausea, palpitations, headaches, tingling sensation, presyncopal episodes, chills, unintentional weight loss of 5 pounds within a couple of months. He denies any hematochezia or melena.  However he does note that his stools are lemuel colored and have indents in them.  No family history of colon cancer  Orthostatics were done which were negative, though his heart rate did increase about 30 points from 60 to 90s.  Patient denied any dizziness, lightheadedness, or palpitations during the orthostatics  -Repeat vitamin D level  - Check CBC and CMP  -Advised to stay hydrated and increase salt intake    #Sleep apnea  He states that he was told he had mild sleep apnea and that he needed to use CPAP.  He disagrees with the diagnosis and would like to reestablish with a new sleep doctor  -Referral to sleep medicine to re-establish     #Hematuria  Urinalysis during his hospitalization in November 2024 showing blood  Patient denies any gross hematuria  - Repeat UA    Follow Up:  Return in about 6 weeks (around 6/6/2025).

## 2025-04-29 ENCOUNTER — HOSPITAL ENCOUNTER (OUTPATIENT)
Dept: LAB | Facility: MEDICAL CENTER | Age: 40
End: 2025-04-29
Payer: MEDICAID

## 2025-04-29 DIAGNOSIS — R53.83 OTHER FATIGUE: ICD-10-CM

## 2025-04-29 DIAGNOSIS — R31.9 HEMATURIA, UNSPECIFIED TYPE: ICD-10-CM

## 2025-04-29 DIAGNOSIS — Z72.51 HIGH RISK HETEROSEXUAL BEHAVIOR: ICD-10-CM

## 2025-04-29 LAB
25(OH)D3 SERPL-MCNC: 30 NG/ML (ref 30–100)
ALBUMIN SERPL BCP-MCNC: 4.2 G/DL (ref 3.2–4.9)
ALBUMIN/GLOB SERPL: 1.6 G/DL
ALP SERPL-CCNC: 91 U/L (ref 30–99)
ALT SERPL-CCNC: 16 U/L (ref 2–50)
ANION GAP SERPL CALC-SCNC: 9 MMOL/L (ref 7–16)
APPEARANCE UR: CLEAR
AST SERPL-CCNC: 22 U/L (ref 12–45)
BACTERIA #/AREA URNS HPF: NORMAL /HPF
BASOPHILS # BLD AUTO: 0.5 % (ref 0–1.8)
BASOPHILS # BLD: 0.05 K/UL (ref 0–0.12)
BILIRUB SERPL-MCNC: 0.5 MG/DL (ref 0.1–1.5)
BILIRUB UR QL STRIP.AUTO: NEGATIVE
BUN SERPL-MCNC: 16 MG/DL (ref 8–22)
CALCIUM ALBUM COR SERPL-MCNC: 9.3 MG/DL (ref 8.5–10.5)
CALCIUM SERPL-MCNC: 9.5 MG/DL (ref 8.5–10.5)
CASTS URNS QL MICRO: NORMAL /LPF (ref 0–2)
CHLORIDE SERPL-SCNC: 106 MMOL/L (ref 96–112)
CO2 SERPL-SCNC: 25 MMOL/L (ref 20–33)
COLOR UR: YELLOW
CREAT SERPL-MCNC: 1.06 MG/DL (ref 0.5–1.4)
EOSINOPHIL # BLD AUTO: 0.1 K/UL (ref 0–0.51)
EOSINOPHIL NFR BLD: 1.1 % (ref 0–6.9)
EPITHELIAL CELLS 1715: NORMAL /HPF (ref 0–5)
ERYTHROCYTE [DISTWIDTH] IN BLOOD BY AUTOMATED COUNT: 42.3 FL (ref 35.9–50)
GFR SERPLBLD CREATININE-BSD FMLA CKD-EPI: 92 ML/MIN/1.73 M 2
GLOBULIN SER CALC-MCNC: 2.7 G/DL (ref 1.9–3.5)
GLUCOSE SERPL-MCNC: 89 MG/DL (ref 65–99)
GLUCOSE UR STRIP.AUTO-MCNC: NEGATIVE MG/DL
HBV CORE AB SERPL QL IA: NONREACTIVE
HBV SURFACE AB SERPL IA-ACNC: 13.3 MIU/ML (ref 0–10)
HBV SURFACE AG SER QL: NORMAL
HCT VFR BLD AUTO: 49.5 % (ref 42–52)
HCV AB SER QL: NORMAL
HGB BLD-MCNC: 16.6 G/DL (ref 14–18)
HIV 1+2 AB+HIV1 P24 AG SERPL QL IA: NORMAL
IMM GRANULOCYTES # BLD AUTO: 0.04 K/UL (ref 0–0.11)
IMM GRANULOCYTES NFR BLD AUTO: 0.4 % (ref 0–0.9)
KETONES UR STRIP.AUTO-MCNC: ABNORMAL MG/DL
LEUKOCYTE ESTERASE UR QL STRIP.AUTO: NEGATIVE
LYMPHOCYTES # BLD AUTO: 2.28 K/UL (ref 1–4.8)
LYMPHOCYTES NFR BLD: 25 % (ref 22–41)
MCH RBC QN AUTO: 30.1 PG (ref 27–33)
MCHC RBC AUTO-ENTMCNC: 33.5 G/DL (ref 32.3–36.5)
MCV RBC AUTO: 89.8 FL (ref 81.4–97.8)
MICRO URNS: ABNORMAL
MONOCYTES # BLD AUTO: 0.5 K/UL (ref 0–0.85)
MONOCYTES NFR BLD AUTO: 5.5 % (ref 0–13.4)
NEUTROPHILS # BLD AUTO: 6.15 K/UL (ref 1.82–7.42)
NEUTROPHILS NFR BLD: 67.5 % (ref 44–72)
NITRITE UR QL STRIP.AUTO: NEGATIVE
NRBC # BLD AUTO: 0 K/UL
NRBC BLD-RTO: 0 /100 WBC (ref 0–0.2)
PH UR STRIP.AUTO: 5.5 [PH] (ref 5–8)
PLATELET # BLD AUTO: 231 K/UL (ref 164–446)
PMV BLD AUTO: 11.1 FL (ref 9–12.9)
POTASSIUM SERPL-SCNC: 4.2 MMOL/L (ref 3.6–5.5)
PROT SERPL-MCNC: 6.9 G/DL (ref 6–8.2)
PROT UR QL STRIP: NEGATIVE MG/DL
RBC # BLD AUTO: 5.51 M/UL (ref 4.7–6.1)
RBC # URNS HPF: NORMAL /HPF (ref 0–2)
RBC UR QL AUTO: ABNORMAL
SODIUM SERPL-SCNC: 140 MMOL/L (ref 135–145)
SP GR UR STRIP.AUTO: 1.02
SPEC CONTAINER SPEC: NORMAL
SPECIMEN SOURCE: NORMAL
T PALLIDUM AB SER QL IA: NORMAL
UROBILINOGEN UR STRIP.AUTO-MCNC: 0.2 EU/DL
WBC # BLD AUTO: 9.1 K/UL (ref 4.8–10.8)
WBC #/AREA URNS HPF: NORMAL /HPF

## 2025-04-29 PROCEDURE — 81001 URINALYSIS AUTO W/SCOPE: CPT

## 2025-04-29 PROCEDURE — 86706 HEP B SURFACE ANTIBODY: CPT

## 2025-04-29 PROCEDURE — 87389 HIV-1 AG W/HIV-1&-2 AB AG IA: CPT

## 2025-04-29 PROCEDURE — 87491 CHLMYD TRACH DNA AMP PROBE: CPT

## 2025-04-29 PROCEDURE — 87661 TRICHOMONAS VAGINALIS AMPLIF: CPT

## 2025-04-29 PROCEDURE — 87591 N.GONORRHOEAE DNA AMP PROB: CPT

## 2025-04-29 PROCEDURE — 82306 VITAMIN D 25 HYDROXY: CPT

## 2025-04-29 PROCEDURE — 36415 COLL VENOUS BLD VENIPUNCTURE: CPT

## 2025-04-29 PROCEDURE — 87340 HEPATITIS B SURFACE AG IA: CPT

## 2025-04-29 PROCEDURE — 80053 COMPREHEN METABOLIC PANEL: CPT

## 2025-04-29 PROCEDURE — 86803 HEPATITIS C AB TEST: CPT

## 2025-04-29 PROCEDURE — 86780 TREPONEMA PALLIDUM: CPT

## 2025-04-29 PROCEDURE — 85025 COMPLETE CBC W/AUTO DIFF WBC: CPT

## 2025-04-29 PROCEDURE — 86704 HEP B CORE ANTIBODY TOTAL: CPT

## 2025-04-30 ENCOUNTER — RESULTS FOLLOW-UP (OUTPATIENT)
Dept: INTERNAL MEDICINE | Facility: OTHER | Age: 40
End: 2025-04-30

## 2025-04-30 ENCOUNTER — TELEPHONE (OUTPATIENT)
Dept: HEALTH INFORMATION MANAGEMENT | Facility: OTHER | Age: 40
End: 2025-04-30
Payer: MEDICAID

## 2025-05-01 ENCOUNTER — OFFICE VISIT (OUTPATIENT)
Dept: CARDIOLOGY | Facility: MEDICAL CENTER | Age: 40
End: 2025-05-01
Payer: MEDICAID

## 2025-05-01 VITALS
DIASTOLIC BLOOD PRESSURE: 70 MMHG | BODY MASS INDEX: 27.28 KG/M2 | HEART RATE: 78 BPM | RESPIRATION RATE: 16 BRPM | OXYGEN SATURATION: 99 % | HEIGHT: 68 IN | SYSTOLIC BLOOD PRESSURE: 110 MMHG | WEIGHT: 180 LBS

## 2025-05-01 DIAGNOSIS — R07.89 CHEST DISCOMFORT: ICD-10-CM

## 2025-05-01 LAB — EKG IMPRESSION: NORMAL

## 2025-05-01 PROCEDURE — 93010 ELECTROCARDIOGRAM REPORT: CPT | Performed by: INTERNAL MEDICINE

## 2025-05-01 PROCEDURE — 99214 OFFICE O/P EST MOD 30 MIN: CPT | Performed by: INTERNAL MEDICINE

## 2025-05-01 PROCEDURE — 93005 ELECTROCARDIOGRAM TRACING: CPT | Mod: TC | Performed by: INTERNAL MEDICINE

## 2025-05-01 PROCEDURE — 99213 OFFICE O/P EST LOW 20 MIN: CPT | Performed by: INTERNAL MEDICINE

## 2025-05-01 PROCEDURE — 3078F DIAST BP <80 MM HG: CPT | Performed by: INTERNAL MEDICINE

## 2025-05-01 PROCEDURE — 3074F SYST BP LT 130 MM HG: CPT | Performed by: INTERNAL MEDICINE

## 2025-05-01 PROCEDURE — 99406 BEHAV CHNG SMOKING 3-10 MIN: CPT | Performed by: INTERNAL MEDICINE

## 2025-05-01 PROCEDURE — 99244 OFF/OP CNSLTJ NEW/EST MOD 40: CPT | Performed by: INTERNAL MEDICINE

## 2025-05-01 ASSESSMENT — ENCOUNTER SYMPTOMS
SHORTNESS OF BREATH: 1
WEIGHT LOSS: 0
GASTROINTESTINAL NEGATIVE: 1
BLURRED VISION: 0
DEPRESSION: 0
PSYCHIATRIC NEGATIVE: 1
WEAKNESS: 0
CLAUDICATION: 0
FOCAL WEAKNESS: 0
DIZZINESS: 0
FEVER: 0
DOUBLE VISION: 0
PALPITATIONS: 0
MUSCULOSKELETAL NEGATIVE: 1
HEADACHES: 0
ABDOMINAL PAIN: 0
EYES NEGATIVE: 1
COUGH: 0
MYALGIAS: 0
NEUROLOGICAL NEGATIVE: 1
NAUSEA: 0
CONSTITUTIONAL NEGATIVE: 1
NERVOUS/ANXIOUS: 0
CHILLS: 0
VOMITING: 0
BRUISES/BLEEDS EASILY: 0

## 2025-05-01 ASSESSMENT — FIBROSIS 4 INDEX: FIB4 SCORE: .9047619047619047619

## 2025-05-01 NOTE — PROGRESS NOTES
Chief Complaint   Patient presents with    New Patient    Other     F/V Dx:  Chest discomfort    Palpitations       Subjective     Jarvis Juan R Perez is a 40 y.o. male who presents today as a consult from [facility] Fung for chest pain.    Thank you for allowing me to evaluate Mr. Perez, who as you know is a 40 year old male with anxiety and depression disorder, previous tobacco abuse, chronic vape use, family history of coronary artery disease. He describes his chest pain to be moderate pressure sensation of his mid to left chest with tingling sensation radiation up down his left arm, lasting couple of minutes. He admits to associated shortness of breath, palpitations, nausea. He denies associated diaphoresis and vomiting. His pain is aggravated by emotional stress and is alleviated by movement, going to the park and shooting a basketball. He works as a .     Past Medical History:   Diagnosis Date    Asthma     Herpesvirus infection 12/13/2018    Psychiatric disorder     anxiety,  OCD     Past Surgical History:   Procedure Laterality Date    NO PERTINENT PAST SURGICAL HISTORY       Family History   Problem Relation Age of Onset    No Known Problems Mother     Coronary artery disease Father     Stroke Father     Lung Disease Maternal Grandmother         asthma    Heart Attack Maternal Grandfather     Heart Disease Maternal Grandfather     Stroke Paternal Grandmother     Heart Disease Paternal Grandfather     Heart Attack Paternal Grandfather     Stroke Paternal Grandfather     Cancer Neg Hx      Social History     Socioeconomic History    Marital status: Single     Spouse name: Not on file    Number of children: Not on file    Years of education: Not on file    Highest education level: Bachelor's degree (e.g., BA, AB, BS)   Occupational History    Not on file   Tobacco Use    Smoking status: Former     Current packs/day: 0.75     Average packs/day: 0.7 packs/day for 15.3 years (11.5  ttl pk-yrs)     Types: Cigarettes     Start date: 2008     Quit date: 2012    Smokeless tobacco: Never    Tobacco comments:     3/4 of a pack a day- trying to quit     5/7/24 - 1 cigarette weekly      10/16 - has not smoked for the past month      12/18-smokes one cigarette once a day   Vaping Use    Vaping status: Every Day    Substances: Nicotine, Flavoring    Devices: Disposable   Substance and Sexual Activity    Alcohol use: Not Currently    Drug use: No    Sexual activity: Not on file   Other Topics Concern    Not on file   Social History Narrative    Not on file     Social Drivers of Health     Financial Resource Strain: Low Risk  (8/3/2022)    Overall Financial Resource Strain (CARDIA)     Difficulty of Paying Living Expenses: Not very hard   Food Insecurity: No Food Insecurity (11/9/2024)    Hunger Vital Sign     Worried About Running Out of Food in the Last Year: Never true     Ran Out of Food in the Last Year: Never true   Transportation Needs: No Transportation Needs (11/9/2024)    PRAPARE - Transportation     Lack of Transportation (Medical): No     Lack of Transportation (Non-Medical): No   Physical Activity: Sufficiently Active (8/3/2022)    Exercise Vital Sign     Days of Exercise per Week: 4 days     Minutes of Exercise per Session: 60 min   Stress: Stress Concern Present (8/3/2022)    [employer] [place] [employer] - Occupational Stress Questionnaire     Feeling of Stress : To some extent   Social Connections: Socially Isolated (8/3/2022)    Social Connection and Isolation Panel [NHANES]     Frequency of Communication with Friends and Family: More than three times a week     Frequency of Social Gatherings with Friends and Family: More than three times a week     Attends [employer] Services: Never     Active Member of Clubs or Organizations: No     Attends Club or Organization Meetings: Never     Marital Status: Never    Intimate Partner Violence: Not At Risk (11/9/2024)     Humiliation, Afraid, Rape, and Kick questionnaire     Fear of Current or Ex-Partner: No     Emotionally Abused: No     Physically Abused: No     Sexually Abused: No   Housing Stability: Low Risk  (11/9/2024)    Housing Stability Vital Sign     Unable to Pay for Housing in the Last Year: No     Number of Times Moved in the Last Year: 0     Homeless in the Last Year: No     Allergies   Allergen Reactions    Food      Other reaction(s): hives, breathing difficulties, pecans    Penicillins Hives     Outpatient Encounter Medications as of 5/1/2025   Medication Sig Dispense Refill    melatonin 1 MG Tab Take 5 mg by mouth every evening.      escitalopram (LEXAPRO) 20 MG tablet TAKE 0.5-1 TABLETS BY MOUTH EVERY DAY. 90 Tablet 1    nicotine (NICODERM) 21 MG/24HR PATCH 24 HR Place 1 Patch on the skin every 24 hours. 30 Patch 1    nicotine polacrilex (NICORETTE) 2 MG Gum Take 1 Each by mouth 4 times a day as needed for Smoking Cessation (for breakthrough cravings). 150 Each 1    vitamin D2, Ergocalciferol, (DRISDOL) 1.25 MG (52686 UT) Cap capsule Take 1 Capsule by mouth every 7 days. 5 Capsule 1    Multiple Vitamin (ONE-A-DAY MENS) Tab Take 1 Tablet by mouth every day.      cyanocobalamin (VITAMIN B-12) 100 MCG Tab Take 100 mcg by mouth every day.       No facility-administered encounter medications on file as of 5/1/2025.     Review of Systems   Constitutional: Negative.  Negative for chills, fever, malaise/fatigue and weight loss.   HENT: Negative.  Negative for hearing loss.    Eyes: Negative.  Negative for blurred vision and double vision.   Respiratory:  Positive for shortness of breath. Negative for cough.    Cardiovascular:  Positive for chest pain. Negative for palpitations, claudication and leg swelling.   Gastrointestinal: Negative.  Negative for abdominal pain, nausea and vomiting.   Genitourinary: Negative.  Negative for dysuria and urgency.   Musculoskeletal: Negative.  Negative for joint pain and myalgias.  "  Skin: Negative.  Negative for itching and rash.   Neurological: Negative.  Negative for dizziness, focal weakness, weakness and headaches.   Endo/Heme/Allergies: Negative.  Does not bruise/bleed easily.   Psychiatric/Behavioral: Negative.  Negative for depression. The patient is not nervous/anxious.               Objective     /70 (BP Location: Left arm, Patient Position: Sitting, BP Cuff Size: Adult)   Pulse 78   Resp 16   Ht 1.727 m (5' 8\")   Wt 81.6 kg (180 lb)   SpO2 99%   BMI 27.37 kg/m²     Physical Exam  Constitutional:       Appearance: Normal appearance. He is well-developed and normal weight.   HENT:      Head: Normocephalic and atraumatic.   Neck:      Vascular: No JVD.   Cardiovascular:      Rate and Rhythm: Normal rate and regular rhythm.      Heart sounds: Normal heart sounds.   Pulmonary:      Effort: Pulmonary effort is normal.      Breath sounds: Normal breath sounds.   Abdominal:      General: Bowel sounds are normal.      Palpations: Abdomen is soft.      Comments: No hepatosplenomegaly.   Musculoskeletal:         General: Normal range of motion.   Lymphadenopathy:      Cervical: No cervical adenopathy.   Skin:     General: Skin is warm and dry.   Neurological:      Mental Status: He is alert and oriented to person, place, and time.            CARDIAC STUDIES/PROCEDURES:    EKG was ordered for chest pain, performed on (05/01/25) was reviewed: EKG, personally interpreted shows sinus rhythm.     Assessment & Plan     1. Chest discomfort  EKG          Medical Decision Making: Today's Assessment/Status/Plan:        Chest pain: He is a 40 year old male with chronic vape nicotine use. He is experiencing chest pain as described above. We will perform an exercise treadmill test and an echocardiogram and follow up with him.    Vape use: Vape cessation recommended with discussions of health effects and plans for over 3 minutes.     We will follow up in 3 months.    Thank you for this " consult.

## 2025-05-02 LAB
SPEC CONTAINER SPEC: NORMAL
SPECIMEN SOURCE: NORMAL
T VAGINALIS RRNA SPEC QL NAA+PROBE: NEGATIVE

## 2025-05-07 ASSESSMENT — ENCOUNTER SYMPTOMS: SLEEP DISTURBANCE: 1

## 2025-05-08 ENCOUNTER — HOSPITAL ENCOUNTER (EMERGENCY)
Facility: MEDICAL CENTER | Age: 40
End: 2025-05-08
Attending: EMERGENCY MEDICINE
Payer: MEDICAID

## 2025-05-08 VITALS
OXYGEN SATURATION: 98 % | TEMPERATURE: 98.2 F | RESPIRATION RATE: 16 BRPM | SYSTOLIC BLOOD PRESSURE: 110 MMHG | DIASTOLIC BLOOD PRESSURE: 68 MMHG | WEIGHT: 183.2 LBS | HEART RATE: 52 BPM | BODY MASS INDEX: 27.86 KG/M2

## 2025-05-08 DIAGNOSIS — R53.83 OTHER FATIGUE: ICD-10-CM

## 2025-05-08 DIAGNOSIS — R11.0 NAUSEA: ICD-10-CM

## 2025-05-08 LAB
ALBUMIN SERPL BCP-MCNC: 3.8 G/DL (ref 3.2–4.9)
ALBUMIN/GLOB SERPL: 1.7 G/DL
ALP SERPL-CCNC: 83 U/L (ref 30–99)
ALT SERPL-CCNC: 12 U/L (ref 2–50)
ANION GAP SERPL CALC-SCNC: 8 MMOL/L (ref 7–16)
AST SERPL-CCNC: 23 U/L (ref 12–45)
BASOPHILS # BLD AUTO: 0.7 % (ref 0–1.8)
BASOPHILS # BLD: 0.06 K/UL (ref 0–0.12)
BILIRUB SERPL-MCNC: 0.4 MG/DL (ref 0.1–1.5)
BUN SERPL-MCNC: 8 MG/DL (ref 8–22)
CALCIUM ALBUM COR SERPL-MCNC: 9.1 MG/DL (ref 8.5–10.5)
CALCIUM SERPL-MCNC: 8.9 MG/DL (ref 8.5–10.5)
CHLORIDE SERPL-SCNC: 110 MMOL/L (ref 96–112)
CO2 SERPL-SCNC: 24 MMOL/L (ref 20–33)
CREAT SERPL-MCNC: 1.09 MG/DL (ref 0.5–1.4)
EOSINOPHIL # BLD AUTO: 0.13 K/UL (ref 0–0.51)
EOSINOPHIL NFR BLD: 1.6 % (ref 0–6.9)
ERYTHROCYTE [DISTWIDTH] IN BLOOD BY AUTOMATED COUNT: 43.8 FL (ref 35.9–50)
GFR SERPLBLD CREATININE-BSD FMLA CKD-EPI: 89 ML/MIN/1.73 M 2
GLOBULIN SER CALC-MCNC: 2.3 G/DL (ref 1.9–3.5)
GLUCOSE SERPL-MCNC: 96 MG/DL (ref 65–99)
HCT VFR BLD AUTO: 42.1 % (ref 42–52)
HETEROPH AB SER QL: NEGATIVE
HGB BLD-MCNC: 14.1 G/DL (ref 14–18)
IMM GRANULOCYTES # BLD AUTO: 0.02 K/UL (ref 0–0.11)
IMM GRANULOCYTES NFR BLD AUTO: 0.2 % (ref 0–0.9)
LYMPHOCYTES # BLD AUTO: 1.95 K/UL (ref 1–4.8)
LYMPHOCYTES NFR BLD: 23.7 % (ref 22–41)
MCH RBC QN AUTO: 30.1 PG (ref 27–33)
MCHC RBC AUTO-ENTMCNC: 33.5 G/DL (ref 32.3–36.5)
MCV RBC AUTO: 89.8 FL (ref 81.4–97.8)
MONOCYTES # BLD AUTO: 0.48 K/UL (ref 0–0.85)
MONOCYTES NFR BLD AUTO: 5.8 % (ref 0–13.4)
NEUTROPHILS # BLD AUTO: 5.6 K/UL (ref 1.82–7.42)
NEUTROPHILS NFR BLD: 68 % (ref 44–72)
NRBC # BLD AUTO: 0 K/UL
NRBC BLD-RTO: 0 /100 WBC (ref 0–0.2)
PLATELET # BLD AUTO: 184 K/UL (ref 164–446)
PMV BLD AUTO: 10.8 FL (ref 9–12.9)
POTASSIUM SERPL-SCNC: 4.2 MMOL/L (ref 3.6–5.5)
PROT SERPL-MCNC: 6.1 G/DL (ref 6–8.2)
RBC # BLD AUTO: 4.69 M/UL (ref 4.7–6.1)
SODIUM SERPL-SCNC: 142 MMOL/L (ref 135–145)
WBC # BLD AUTO: 8.2 K/UL (ref 4.8–10.8)

## 2025-05-08 PROCEDURE — 36415 COLL VENOUS BLD VENIPUNCTURE: CPT

## 2025-05-08 PROCEDURE — 80053 COMPREHEN METABOLIC PANEL: CPT

## 2025-05-08 PROCEDURE — 85025 COMPLETE CBC W/AUTO DIFF WBC: CPT

## 2025-05-08 PROCEDURE — 99284 EMERGENCY DEPT VISIT MOD MDM: CPT

## 2025-05-08 PROCEDURE — 86308 HETEROPHILE ANTIBODY SCREEN: CPT

## 2025-05-08 RX ORDER — ONDANSETRON 4 MG/1
4 TABLET, ORALLY DISINTEGRATING ORAL EVERY 6 HOURS PRN
Qty: 10 TABLET | Refills: 0 | Status: SHIPPED | OUTPATIENT
Start: 2025-05-08

## 2025-05-08 ASSESSMENT — FIBROSIS 4 INDEX: FIB4 SCORE: .9047619047619047619

## 2025-05-08 NOTE — ED TRIAGE NOTES
Chief Complaint   Patient presents with    Nausea    Diarrhea     Pt reports several days of diarrhea. Reports stool is clear and jelly. Reports normal appetite until today.  Denies vomiting.   /68   Pulse 93   Temp 37 °C (98.6 °F) (Temporal)   Resp 12   Wt 83.1 kg (183 lb 3.2 oz)   SpO2 96%   Pt informed of wait times. Educated on triage process.  Asked to return to triage RN for any new or worsening of symptoms. Thanked for patience.

## 2025-05-08 NOTE — ED NOTES
Pt escorted from lobby to room 62. Pt is now sitting up in bed on monitor with even and unlabored breaths, in no apparent distress at this time. Chart up for ERP.

## 2025-05-09 ENCOUNTER — OFFICE VISIT (OUTPATIENT)
Dept: SLEEP MEDICINE | Facility: MEDICAL CENTER | Age: 40
End: 2025-05-09
Payer: MEDICAID

## 2025-05-09 VITALS
SYSTOLIC BLOOD PRESSURE: 110 MMHG | RESPIRATION RATE: 16 BRPM | HEIGHT: 69 IN | HEART RATE: 76 BPM | OXYGEN SATURATION: 95 % | WEIGHT: 182 LBS | BODY MASS INDEX: 26.96 KG/M2 | DIASTOLIC BLOOD PRESSURE: 72 MMHG

## 2025-05-09 DIAGNOSIS — G47.33 OSA (OBSTRUCTIVE SLEEP APNEA): ICD-10-CM

## 2025-05-09 DIAGNOSIS — G47.24 NON-24 HOUR SLEEP-WAKE TYPE CIRCADIAN RHYTHM SLEEP-WAKE DISORDER: ICD-10-CM

## 2025-05-09 PROCEDURE — 99214 OFFICE O/P EST MOD 30 MIN: CPT

## 2025-05-09 PROCEDURE — 99204 OFFICE O/P NEW MOD 45 MIN: CPT

## 2025-05-09 ASSESSMENT — FIBROSIS 4 INDEX: FIB4 SCORE: 1.371206889325361191

## 2025-05-09 NOTE — PROGRESS NOTES
[facility] Consult Note     Date: 5/9/2025 / Time: 10:58 AM      Thank you for requesting a sleep medicine consultation on Jarvis Perez at the sleep center. Presents today with the chief complaints of  non 24 hour circadian rhythm disturbance . He is referred by PCP to establish care with sleep medicine .       HISTORY OF PRESENT ILLNESS:     Jarvis Perez is a 40 y.o. male with history of anxiety, depression, previous smoker, OCD .  Jarvis presents to Sleep Clinic to establish care with sleep medicine .     Patient states that his body seems to be on a 26 hour clock and does a complete 24 hour shift over the course of about 2 weeks. This began in his early 20s. Previously he was on ambien for a short period and had lots of side effects and prescribed trazadone but has not taken it. He has tried melatonin 7.5 mg which did seem to be helpful, but just after starting this medication about a week ago he developed severe diarrhea that seemed very related to the timing of starting melatonin.  He stopped taking the melatonin and has an appointment to see GI to discuss this further.    Patient was diagnosed with mild CRYSTAL about a year ago through melioREM via [facility] . At that time, patient was started on CPAP. He used it for about a week.  He did not feel like his concerns were being addressed and did not feel benefit from the CPAP machine.  Patient's schedule is lined up with desired sleep times he is able to fall asleep easily and feel restored after sleeping but when his schedule shifts off he is in a constant struggle to be able to fall asleep and stay asleep.      Constant perez trying to quit smoking.     As per supplemental questionnaire to be scanned or imported into chart:    [place] Sleepiness Score: 1    Sleep Schedule  Work scheduled varies, usually 6PM to midnight  Ideal sleep time is 2-11AM, but this is a constant struggle   Sleep-onset latency: 5-10min to 1-2 hours   Awakenings from  "sleep: 3-4 to urinate   Difficulty falling back asleep: not usually   Bedroom partner: no  Naps: sometimes    DAYTIME SYMPTOMS: - worsened when his schedule is off   Excessive daytime sleepiness: yes  Daytime fatigue: yes  Difficulty concentrating: yes  Memory problems: yes  Irritability: yes  Work/school performance issues: sometimes  Sleepiness with driving: no  Caffeine/stimulant use: No  Alcohol use:No     SLEEP RELATED SYMPTOMS  Snoring: no  Witnessed apnea or gasping/choking: No   Dry mouth or mouth breathing: no  Sweating: no  Teeth grinding/biting: yes - to the point where he will sometimes spit out blood   Morning headaches: no  Refreshed Upon Awakening: sometimes     SLEEP RELATED BEHAVIORS:  Parasomnias (walking, talking, eating, violence): No   Leg kicking: yes   Restless legs - \"urge to move\": not anymore  Nightmares: no Recurrent: No   Dream enactment: No      NARCOLEPSY:  Cataplexy: No   Sleep paralysis: No   Sleep attacks: No   Hypnagogic/hypnopompic hallucinations: rare     Occupation: [employer]    MEDICAL HISTORY  Past Medical History:   Diagnosis Date    Anxiety     Asthma     Back pain     Blood in urine     Daytime sleepiness     Depression     Diarrhea     Fatigue     Frequent urination     Herpesvirus infection 12/13/2018    Kidney stone     Nausea     Psychiatric disorder     anxiety,  OCD    Scarlet fever     Wears glasses         SURGICAL HISTORY  Past Surgical History:   Procedure Laterality Date    NO PERTINENT PAST SURGICAL HISTORY          FAMILY HISTORY  Family History   Problem Relation Age of Onset    No Known Problems Mother     Coronary artery disease Father     Stroke Father     Psychiatric Illness Father         Depression/Anxiety    Alcohol abuse Father     Sleep Apnea Father     Lung Disease Maternal Grandmother         asthma    Heart Disease Maternal Grandmother     Heart Attack Maternal Grandfather     Heart Disease Maternal Grandfather     Stroke Paternal Grandmother     " Respiratory Disease Paternal Grandmother         Asthma    Heart Disease Paternal Grandfather     Heart Attack Paternal Grandfather     Stroke Paternal Grandfather     Psychiatric Illness Paternal Grandfather         OCD    Alcohol abuse Paternal Grandfather     Sleep Apnea Brother     Cancer Neg Hx        SOCIAL HISTORY  Social History     Socioeconomic History    Marital status: Single    Highest education level: Bachelor's degree (e.g., BA, AB, BS)   Tobacco Use    Smoking status: Every Day     Current packs/day: 0.75     Average packs/day: 0.8 packs/day for 15.4 years (11.5 ttl pk-yrs)     Types: Cigarettes     Start date: 1/1/2008     Last attempt to quit: 2012    Smokeless tobacco: Never    Tobacco comments:     I have had many quitting bouts with tobacco over the last couple of years, meghod of quitting is gum   Vaping Use    Vaping status: Every Day    Substances: Nicotine, Flavoring    Devices: Disposable   Substance and Sexual Activity    Alcohol use: Not Currently    Drug use: No     Social Drivers of Health     Financial Resource Strain: Low Risk  (8/3/2022)    Overall Financial Resource Strain (CARDIA)     Difficulty of Paying Living Expenses: Not very hard   Food Insecurity: No Food Insecurity (11/9/2024)    Hunger Vital Sign     Worried About Running Out of Food in the Last Year: Never true     Ran Out of Food in the Last Year: Never true   Transportation Needs: No Transportation Needs (11/9/2024)    PRAPARE - Transportation     Lack of Transportation (Medical): No     Lack of Transportation (Non-Medical): No   Physical Activity: Sufficiently Active (8/3/2022)    Exercise Vital Sign     Days of Exercise per Week: 4 days     Minutes of Exercise per Session: 60 min   Stress: Stress Concern Present (8/3/2022)    [employer] - Occupational Stress Questionnaire     Feeling of Stress : To some extent   Social Connections: Socially Isolated (8/3/2022)    Social Connection and  Isolation Panel [NHANES]     Frequency of Communication with Friends and Family: More than three times a week     Frequency of Social Gatherings with Friends and Family: More than three times a week     Attends Orthodox Services: Never     Active Member of Clubs or Organizations: No     Attends Club or Organization Meetings: Never     Marital Status: Never    Intimate Partner Violence: Not At Risk (11/9/2024)    Humiliation, Afraid, Rape, and Kick questionnaire     Fear of Current or Ex-Partner: No     Emotionally Abused: No     Physically Abused: No     Sexually Abused: No   Housing Stability: Low Risk  (11/9/2024)    Housing Stability Vital Sign     Unable to Pay for Housing in the Last Year: No     Number of Times Moved in the Last Year: 0     Homeless in the Last Year: No          CURRENT MEDICATIONS  Current Outpatient Medications   Medication Sig    escitalopram (LEXAPRO) 20 MG tablet TAKE 0.5-1 TABLETS BY MOUTH EVERY DAY.    nicotine (NICODERM) 21 MG/24HR PATCH 24 HR Place 1 Patch on the skin every 24 hours.    nicotine polacrilex (NICORETTE) 2 MG Gum Take 1 Each by mouth 4 times a day as needed for Smoking Cessation (for breakthrough cravings).    ondansetron (ZOFRAN ODT) 4 MG TABLET DISPERSIBLE Take 1 Tablet by mouth every 6 hours as needed for Nausea/Vomiting. (Patient not taking: Reported on 5/9/2025)    melatonin 1 MG Tab Take 5 mg by mouth every evening. (Patient not taking: Reported on 5/9/2025)    vitamin D2, Ergocalciferol, (DRISDOL) 1.25 MG (48641 UT) Cap capsule Take 1 Capsule by mouth every 7 days. (Patient not taking: Reported on 5/9/2025)    Multiple Vitamin (ONE-A-DAY MENS) Tab Take 1 Tablet by mouth every day. (Patient not taking: Reported on 5/9/2025)    cyanocobalamin (VITAMIN B-12) 100 MCG Tab Take 100 mcg by mouth every day. (Patient not taking: Reported on 5/9/2025)       REVIEW OF SYSTEMS  Constitutional: Denies fevers, Denies weight changes  Ears/Nose/Throat/Mouth: Denies nasal  "congestion or sore throat   Cardiovascular: Denies chest pain  Respiratory: Denies shortness of breath, Denies cough  Gastrointestinal/Hepatic: Denies nausea, vomiting  Sleep: see HPI    Physical Examination:  Vitals/ General Appearance:   Encounter Vitals  Blood Pressure: 110/72  Pulse: 76  Respiration: 16  Pulse Oximetry: 95 %  Weight: 82.6 kg (182 lb) (per patient)  Height: 175.3 cm (5' 9\") (per patient)  BMI (Calculated): 26.88    Pt. is alert and oriented to time, place and person. Cooperative and in no apparent distress.     Constitutional: Alert, no distress, well-groomed.  Skin: No rashes in visible areas.  Eye: Round. Conjunctiva clear, lids normal. No icterus.   ENT EXAM  Nasal septum deviation: No   Nasal turbinate hypertrophy: Left: Grade 2   Right: Grade 2  Nasal erythema: No   Oropharyngeal erythema No   Hard palate narrow: Yes  Hard palate high: Yes  Soft palate/uvula (Mallampati score): 2  Tongue Scalloping: No   Retrognathia: No   Micrognathia: No   Cardiovascular:normal S1 and S2 heart sounds, regular rate and rhythm  Pulmonary:Normal breath sounds, Clear to auscultation  Neurologic:Muscle tone normal, Awake, alert and oriented x 3  Extremities: No clubbing, cyanosis, or edema     Bicarb:   Lab Results   Component Value Date/Time    CO2 24 05/08/2025 1740    CO2 25 04/29/2025 1443    CO2 27 11/12/2024 0259     TSH:   Lab Results   Component Value Date/Time    TSHULTRASEN 1.360 10/31/2024 0546     CREATININE:   Lab Results   Component Value Date/Time    CREATININE 1.09 05/08/2025 1740     VIT D:   Lab Results   Component Value Date/Time    25HYDROXY 30 04/29/2025 1443     H/H:  Lab Results   Component Value Date/Time    HEMOGLOBIN 14.1 05/08/2025 05:40 PM           Medical Decision Making   Assessment and Plan  The medical record was reviewed.    1. 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 Daily sleep logs and actigraphy of at least 14 days demonstrate a pattern of that typically delay each day with a circadian period that is usually longer than 24 hours.  Sleep disturbance is not better explained by another sleep disorder, medical or neurological disorder, mental disorder, medication use, or substance use disorder.  (ICSD-3).   -Recommended treatment options include:   Prescribed sleep/sleep scheduling as a method to improve circadian rhythm  Circadian phase shifting by timed light exposure (daylight exposure, 2,000 lux for 2 hours, 10,000 lux for 45 min)  Circadian phase shifting by timed melatonin administration. Typically, 3-5 mg 4 hours before turning off the light  There is mixed data regarding use of vitamin B12 to increase sensitivity to light    Tasimelteon can be considered if melatonin is ineffective  - Plan: Patient will try to adhere to bedtime sleeping schedule.  Discussed light recommendations.  Patient will consider lower dose of melatonin versus waiting to speak with GI prior to resuming therapy.  Patient will look up tasimelteon to see if he is interested in trying this therapy if melatonin is ineffective    2. Obstructive sleep apnea  - Patient was diagnosed with mild sleep apnea via home testing.  Records not available but ROLANDO was sent during this visit.  Patient was started on CPAP and use for about a week with no benefit and difficulty tolerating use.  Patient would benefit from an in lab study for further evaluation.  - Patients with CRYSTAL are at increased risk of cardiovascular disease including coronary artery disease, systemic arterial hypertension, pulmonary arterial hypertension, cardiac arrythmias, and stroke.  "Positive airway pressure will favorably impact many of the adverse conditions and effects provoked by CRYSTAL. Avoid driving a motor vehicle when drowsy  - Order placed for PSG   - Advised to reach out via MyChart with questions       Return in about 3 months (around 8/9/2025).   - Recommend an earlier appointment, if significant treatment barriers develop.  - Patient to follow up with the appropriate healthcare practitioners for all other medical problems and issues.    Please note portions of this record was created using voice recognition software. I have made every reasonable attempt to correct obvious errors, but I expect that there are errors of grammar and possibly content I did not discover before finalizing the note.      Answers submitted by the patient for this visit:  Sleep Center Questionnaire (Submitted on 5/7/2025)  Year of your last physical exam: 2025  Results of exam: Normal  Occupation : Poker  Height: 5'9\"  Current weight: 175  6 months ago: 175  At age 22: 175  What is the reason for your visit today?: Discussion on sleep issues  Name of person referring you to the Sleep Center: Dr. Vanegas of [facility]  Have you ever been hospitalized?: Yes  Reason, year, and hospital in which you were hospitalized:: Kidney stone. 2024. [facility].  Have you ever had problems with anesthesia?: No  Have you experienced post-operative delirium?: No  Any complications with surgery?: No  What year did you receive your last Flu shot?: N/A  What year did you receive you last Pneumonia shot?: Not sure  Have you had a TB skin test? If so, please list the year and result:: Not sure  Have you had Allergy skin testing? If so, please list the year and result:: No  Please briefly describe your sleep problem and how old you were when it began.: My sleep pattern seems to be on a 26 hour clock. This began about 15 years ago in my early 20s It shifts and does a complete 24 hour change over the course of about 2 weeks  How " does this affect your daily life and activities? Please also rate how serious of a problem this is (1 = Not at all, 10 = Very Serious).: 8  Have you had any previous evaluations, examinations, or treatment for this sleep problem or any other problems with your sleep? If so, please describe the evaluation, treatment, and results.: Yes. They claimed I had mild airway obstruction but did not address that i wake up a couple dozen of times throughout the night. Not a conscious awake but my body becomes alert.  Have you used any medications (prescribed or otherwise) to help your sleep problem? If yes, include name, amount, frequency, and the prescribing physician.: I was perscribed ambien about 15 years ago but only used it for a short time period. I have been prescribed Trazadone but do not take it. I have attempt to use melatonin supplements but it seems to give me irritable bowel syndrome.  If employed, what time do you usually start and end work?: My work hours vary. I generally work from 6 PM to midnight  Do you ever change work shifts? If yes, describe how often (never, infrequently, regularly).: My work does not have a set schedule  What time do you usually go to bed and wake up on: Weekdays? Weekends?: My ideal sleep time is from 2-11AM but as stated before, this is a constant struggle  Do you have a regular bed partner?: No  How many minutes does it usually take to fall asleep at night after turning off the lights?: It ranges very greatly. At times maybe 5-10 minutes. Others it can take an hour or two  What do you ordinarily do just prior to turning out the lights and attempting to go to sleep (e.g., reading, TV, baths, etc.)?: I watch sports highlights on a small TV in my room. At times I scroll social media  On average, how many times do you wake up during the night?: Consciously awake, once for a bathroom visit.  On average, how many times do you wake up to use the bathroom?: Once  Do you often wake up too  early in the morning and are unable to return to sleep?: Once to twice a week. It varies at times  On average, how many hours of sleep do you get per night?: 7-9  How do you usually awaken?  Alarm, spontaneously, or other?: Spontaneously  Is it difficult for you to awaken and get out of bed after sleeping? (Not at all, Sometimes, Very): More than sometimes but not always.  Do you nap or return to bed after arising?: Not regularly  Are you bothered by sleepiness during the day?: Yes  Do you feel that you get too much sleep at night?: No  Do you feel that you get too little sleep at night?: Sometimes  Do you usually feel tired during the day? If so, what do you attribute this to?: Yes. I am not sure as I feel I generally get adequate amounts of sleep.  Do you find yourself falling asleep when you don't mean to? : No  If yes, how long does your sleep episode last?: N/A  Do you feel rested or refreshed after the sleep episode?: No  Have you ever suddenly fallen?: No  Have you ever experienced sudden body weakness?: Very rare  If yes, were you aware of the things around you?: Yes  Was the weakness brought on by any particular event or feeling? If so, briefly describe.: No  Have you ever experienced weakness or paralysis upon going to sleep?: No  Have you ever experienced weakness or paralysis upon awakening from sleep?: No  Have you ever experienced seeing things or hearing voices/noises: That weren't real? On going to sleep? During the night? On awakening from sleep? During the day?: Once in very great while. Usually when i have not slept for beyond 20 hours  Do you have difficulty breathing at night? If yes, briefly describe.: No  Have you been told you snore while asleep? If so, does it disturb a bed partner (or someone in the same room), or someone in the next room?: No  Have you ever experienced doing something without being aware of the action? If yes, please describe.: No  Have you ever experienced upon lying in  "bed before sleep or on awakening from sleep: Restlessness of legs, \"nervous legs\", \"creeping crawling\" sensation of legs, or twitching of legs?: I had a small spurt of this occurence but no longer persists  How many times per week does this occur, and how many minutes does the sensation last?: It happened quite regularly during the spurt  Does anything relieve the sensations (e.g., getting out of bed, medication, massage)?: Yes  At what age did this first occur, and how many years has this occurred?: 37 and lasted for only a couple of months  Have you ever been told that your arms or legs jerk or twitch while you are asleep? If yes, how many times per night does this occur?: I have identified this on my own and it generally only happens once right before falling asleep  At what age did this first occur, and how many years has this occurred?: This has occured only within the last year or two  Does this seem to awaken you from your sleep?: Yes  Do you know, or have you ever been told that you do any of the following while sleeping: talk, walk, grit teeth, wet the bed, wake up screaming or seemingly afraid, have disturbing dreams, have unusual movements, wake up with headaches, (males) have erections? If yes to any of these, please indicate how many times per week, age started, last occurrence, treatment received.: I very rarely wake up with an erection. I seem to grind my teeth very regularly and sometimes wake up to spit blood out of my mouth. This occurs about half the week and started about a couple of years ago  Has anyone in your family been known to have any sleep problems? If yes, please list the type of problem (e.g., trouble getting to sleep, too sleepy, bed wetting, etc.), the relationship of this person to you, and the treatment received.: My paternal side has bouts with sleep apnea. My father and brother of which are immediate family members. They both use CPAP machines    "

## 2025-05-09 NOTE — ED PROVIDER NOTES
ED Provider Note    CHIEF COMPLAINT  Chief Complaint   Patient presents with    Nausea    Diarrhea       EXTERNAL RECORDS REVIEWED  Outpatient Notes outpatient cardiology note 5/1/2025 reviewed.  Patient seen for chest pain.  Additional outpatient R internal medicine office visit 4/25/2025 with multiple complaints being explored    HPI/ROS  LIMITATION TO HISTORY   Select: : None  OUTSIDE HISTORIAN(S):  Family mother at bedside    Jarvis Perez is a 40 y.o. male who presents to the ER with intermittent GI upset, abdominal cramping, ongoing diarrhea and newer nausea.  Patient has been seen by outpatient PCP for the same symptoms and also some ongoing fatigue.  Has had outpatient labs which have been benign.  No foreign travel.  No known sick contacts with same.  No abnormal pets.  No abnormal water sources.  No recent antibiotics.  Most recent antibiotics were in November of last year.      PAST MEDICAL HISTORY   has a past medical history of Asthma, Herpesvirus infection (12/13/2018), and Psychiatric disorder.    SURGICAL HISTORY   has a past surgical history that includes no pertinent past surgical history.    FAMILY HISTORY  Family History   Problem Relation Age of Onset    No Known Problems Mother     Coronary artery disease Father     Stroke Father     Lung Disease Maternal Grandmother         asthma    Heart Attack Maternal Grandfather     Heart Disease Maternal Grandfather     Stroke Paternal Grandmother     Heart Disease Paternal Grandfather     Heart Attack Paternal Grandfather     Stroke Paternal Grandfather     Cancer Neg Hx        SOCIAL HISTORY  Social History     Tobacco Use    Smoking status: Former     Current packs/day: 0.75     Average packs/day: 0.8 packs/day for 15.3 years (11.5 ttl pk-yrs)     Types: Cigarettes     Start date: 2008     Quit date: 2012    Smokeless tobacco: Never    Tobacco comments:     3/4 of a pack a day- trying to quit     5/7/24 - 1 cigarette weekly      10/16 - has  not smoked for the past month      12/18-smokes one cigarette once a day   Vaping Use    Vaping status: Every Day    Substances: Nicotine, Flavoring    Devices: Disposable   Substance and Sexual Activity    Alcohol use: Not Currently    Drug use: No    Sexual activity: Not on file       CURRENT MEDICATIONS  Home Medications       Reviewed by Tonya Bourgeois R.N. (Registered Nurse) on 05/08/25 at 1630  Med List Status: Partial     Medication Last Dose Status   cyanocobalamin (VITAMIN B-12) 100 MCG Tab  Active   escitalopram (LEXAPRO) 20 MG tablet  Active   melatonin 1 MG Tab  Active   Multiple Vitamin (ONE-A-DAY MENS) Tab  Active   nicotine (NICODERM) 21 MG/24HR PATCH 24 HR  Active   nicotine polacrilex (NICORETTE) 2 MG Gum  Active   vitamin D2, Ergocalciferol, (DRISDOL) 1.25 MG (80447 UT) Cap capsule  Active                    ALLERGIES  Allergies   Allergen Reactions    Food      Other reaction(s): hives, breathing difficulties, pecans    Penicillins Hives       PHYSICAL EXAM  VITAL SIGNS: /68   Pulse (!) 52   Temp 36.8 °C (98.2 °F) (Temporal)   Resp 16   Wt 83.1 kg (183 lb 3.2 oz)   SpO2 98%   BMI 27.86 kg/m²      Pulse ox interpretation: I interpret this pulse ox as normal.  Constitutional: Alert in no apparent distress.  HENT: No signs of trauma, Bilateral external ears normal, Nose normal.   Eyes: Pupils are equal and reactive   Neck: Normal range of motion, No tenderness, Supple  Cardiovascular: Regular rate and rhythm, no murmurs.   Thorax & Lungs: Normal breath sounds, No respiratory distress  Abdomen: Bowel sounds normal, Soft, No tenderness  Skin: Warm, Dry, No erythema, No rash.   Musculoskeletal: Good range of motion in all major joints. No tenderness to palpation or major deformities noted.   Neurologic: Alert , Normal motor function, Normal sensory function, No focal deficits noted.   Psychiatric: Affect normal, Judgment normal, Mood normal.         EKG/LABS  Results for orders placed  or performed during the hospital encounter of 05/08/25   CBC WITH DIFFERENTIAL    Collection Time: 05/08/25  5:40 PM   Result Value Ref Range    WBC 8.2 4.8 - 10.8 K/uL    RBC 4.69 (L) 4.70 - 6.10 M/uL    Hemoglobin 14.1 14.0 - 18.0 g/dL    Hematocrit 42.1 42.0 - 52.0 %    MCV 89.8 81.4 - 97.8 fL    MCH 30.1 27.0 - 33.0 pg    MCHC 33.5 32.3 - 36.5 g/dL    RDW 43.8 35.9 - 50.0 fL    Platelet Count 184 164 - 446 K/uL    MPV 10.8 9.0 - 12.9 fL    Neutrophils-Polys 68.00 44.00 - 72.00 %    Lymphocytes 23.70 22.00 - 41.00 %    Monocytes 5.80 0.00 - 13.40 %    Eosinophils 1.60 0.00 - 6.90 %    Basophils 0.70 0.00 - 1.80 %    Immature Granulocytes 0.20 0.00 - 0.90 %    Nucleated RBC 0.00 0.00 - 0.20 /100 WBC    Neutrophils (Absolute) 5.60 1.82 - 7.42 K/uL    Lymphs (Absolute) 1.95 1.00 - 4.80 K/uL    Monos (Absolute) 0.48 0.00 - 0.85 K/uL    Eos (Absolute) 0.13 0.00 - 0.51 K/uL    Baso (Absolute) 0.06 0.00 - 0.12 K/uL    Immature Granulocytes (abs) 0.02 0.00 - 0.11 K/uL    NRBC (Absolute) 0.00 K/uL   CMP    Collection Time: 05/08/25  5:40 PM   Result Value Ref Range    Sodium 142 135 - 145 mmol/L    Potassium 4.2 3.6 - 5.5 mmol/L    Chloride 110 96 - 112 mmol/L    Co2 24 20 - 33 mmol/L    Anion Gap 8.0 7.0 - 16.0    Glucose 96 65 - 99 mg/dL    Bun 8 8 - 22 mg/dL    Creatinine 1.09 0.50 - 1.40 mg/dL    Calcium 8.9 8.5 - 10.5 mg/dL    Correct Calcium 9.1 8.5 - 10.5 mg/dL    AST(SGOT) 23 12 - 45 U/L    ALT(SGPT) 12 2 - 50 U/L    Alkaline Phosphatase 83 30 - 99 U/L    Total Bilirubin 0.4 0.1 - 1.5 mg/dL    Albumin 3.8 3.2 - 4.9 g/dL    Total Protein 6.1 6.0 - 8.2 g/dL    Globulin 2.3 1.9 - 3.5 g/dL    A-G Ratio 1.7 g/dL   MONONUCLEOSIS TEST QUAL    Collection Time: 05/08/25  5:40 PM   Result Value Ref Range    Heterophile Screen Negative Negative   ESTIMATED GFR    Collection Time: 05/08/25  5:40 PM   Result Value Ref Range    GFR (CKD-EPI) 89 >60 mL/min/1.73 m 2         COURSE & MEDICAL DECISION MAKING    ASSESSMENT, COURSE  "AND PLAN  Care Narrative: 40-year-old presenting with ongoing symptomatology as described above.  Will repeat labs as agreed upon at bedside.  Will additionally add stool studies should he be able to produce a stool.    DISPOSITION AND DISCUSSIONS  I have discussed management of the patient with the following physicians and MEERA's: None    Discussion of management with other QHP or appropriate source(s): None     Escalation of care considered, and ultimately not performed: diagnostic imaging and acute inpatient care management, however at this time, the patient is most appropriate for outpatient management    Barriers to care at this time, including but not limited to:  None .     40-year-old male presenting the ER with above presentation.  Repeat labs are reassuring.  Monospot is negative.  Stool studies unobtainable.  Will defer to outpatient follow-up by PCP as well as GI as \"referred \".  Due to some intermittent waves of nausea even while here he will be prescribed Zofran.  Understanding of diet follow-up.  Will return with any acute changes or worsening.    FINAL DIAGNOSIS  1. Nausea    2. Other fatigue         Electronically signed by: Rito Navarrete M.D., 5/8/2025 5:14 PM      "

## 2025-05-09 NOTE — ED NOTES
Pt asking for water, okay per erp. Water provided. Pt resting with even chest rise and fall, reports no needs at this time, call light available and in reach.

## 2025-05-09 NOTE — ED NOTES
Phlebotomy contacted for blood draw. Pt updated. Pt resting with even chest rise and fall, reports no needs at this time, call light available and in reach.

## 2025-05-09 NOTE — ED NOTES
Patient given discharge instructions and verbalizes understanding. Pt getting dressed at this time.

## 2025-05-12 ENCOUNTER — RESULTS FOLLOW-UP (OUTPATIENT)
Dept: CARDIOLOGY | Facility: MEDICAL CENTER | Age: 40
End: 2025-05-12

## 2025-05-12 ENCOUNTER — HOSPITAL ENCOUNTER (OUTPATIENT)
Dept: CARDIOLOGY | Facility: MEDICAL CENTER | Age: 40
End: 2025-05-12
Attending: INTERNAL MEDICINE
Payer: MEDICAID

## 2025-05-12 DIAGNOSIS — R07.89 CHEST DISCOMFORT: ICD-10-CM

## 2025-05-12 LAB
LV EJECT FRACT  99904: 60
LV EJECT FRACT MOD 2C 99903: 60.47
LV EJECT FRACT MOD 4C 99902: 60.34
LV EJECT FRACT MOD BP 99901: 60.08

## 2025-05-12 PROCEDURE — 93306 TTE W/DOPPLER COMPLETE: CPT

## 2025-05-12 PROCEDURE — 93306 TTE W/DOPPLER COMPLETE: CPT | Mod: 26 | Performed by: INTERNAL MEDICINE

## 2025-05-23 ENCOUNTER — PATIENT MESSAGE (OUTPATIENT)
Dept: SLEEP MEDICINE | Facility: MEDICAL CENTER | Age: 40
End: 2025-05-23
Payer: MEDICAID

## 2025-05-23 DIAGNOSIS — G47.33 OSA (OBSTRUCTIVE SLEEP APNEA): Primary | ICD-10-CM

## 2025-05-23 NOTE — TELEPHONE ENCOUNTER
----- Message from Nurse Practitioner Merle Gupta, [email or web address] sent at 5/23/2025 10:26 AM PDT -----  Regarding: RE: IN LAB SLEEP STUDY DENIAL - SCHEDULED 06/05/2025  Message sent to patient to see if he is open to home sleep testing instead.  ----- Message -----  From: Winsome Hernandez  Sent: 5/22/2025   4:05 PM PDT  To: Genesis Izaguirre, Med Ass't; Merle Gupta#  Subject: IN LAB SLEEP STUDY DENIAL - SCHEDULED 06/05/#    Hi, This study was denied by insurance, patient is scheduled for 06/05/2025.[employer] MEDICA[employer] DENIED - CASE# [id number]# [id number] 6/5/25Jarvis Perez1985MRN: [id number]# [id number] advise. Thank you, Winsome

## 2025-05-29 NOTE — PATIENT COMMUNICATION
Spoke with Jarvis regarding insurance denial for in lab sleep study. In lab is canceled and he said he would proceed with a home sleep study.   
[Negative] : Heme/Lymph

## 2025-06-05 ENCOUNTER — APPOINTMENT (OUTPATIENT)
Dept: SLEEP MEDICINE | Facility: MEDICAL CENTER | Age: 40
End: 2025-06-05
Payer: MEDICAID

## 2025-06-06 NOTE — Clinical Note
REFERRAL APPROVAL NOTICE         Sent on June 6, 2025                   Jarvis Perez  [address]  [place] [address]                   Dear Mr. Perez,    After a careful review of the medical information and benefit coverage, [facility] has processed your referral. See below for additional details.    If applicable, you must be actively enrolled with your insurance for coverage of the authorized service. If you have any questions regarding your coverage, please contact your insurance directly.    REFERRAL INFORMATION   Referral #:  [id number]  Referred-To Department    Referred-By Provider:  Pulmonary and Sleep Medicine    [facility]      [address]  [address]  [place] [address]  [phone] [address]  [address]  [place] [id number] [phone]    Referral Start Date:  05/29/2025  Referral End Date:   05/29/2026             SCHEDULING  If you do not already have an appointment, please call [phone] to make an appointment.     MORE INFORMATION  If you do not already have a [employer] account, sign up at: [email or web address]  You can access your medical information, make appointments, see lab results, billing information, and more.  If you have questions regarding this referral, please contact  the [facility] Referrals department at:             [phone]. Monday - Friday 8:00AM - 5:00PM.     Sincerely,    [facility]

## 2025-06-09 ENCOUNTER — APPOINTMENT (OUTPATIENT)
Dept: INTERNAL MEDICINE | Facility: OTHER | Age: 40
End: 2025-06-09
Payer: MEDICAID

## 2025-06-17 ENCOUNTER — TELEPHONE (OUTPATIENT)
Dept: HEALTH INFORMATION MANAGEMENT | Facility: OTHER | Age: 40
End: 2025-06-17
Payer: MEDICAID

## 2025-07-17 ENCOUNTER — PATIENT MESSAGE (OUTPATIENT)
Dept: SCHEDULING | Facility: IMAGING CENTER | Age: 40
End: 2025-07-17
Payer: MEDICAID

## 2025-08-29 ENCOUNTER — TELEPHONE (OUTPATIENT)
Dept: CARDIOLOGY | Facility: MEDICAL CENTER | Age: 40
End: 2025-08-29
Payer: MEDICAID

## (undated) DEVICE — TUBING CLEARLINK DUO-VENT - C-FLO (48EA/CA)

## (undated) DEVICE — GOWN SURGICAL X-LARGE ULTRA - FILM-REINFORCED (20/CA)

## (undated) DEVICE — PACK CYSTOSCOPY III - (8/CA)

## (undated) DEVICE — GOWN WARMING STANDARD FLEX - (30/CA)

## (undated) DEVICE — SENSOR OXIMETER ADULT SPO2 RD SET (20EA/BX)

## (undated) DEVICE — WIRE GUIDE SENSOR DUAL FLEX - 5/BX

## (undated) DEVICE — SPONGE GAUZESTER 4 X 4 4PLY - (128PK/CA)

## (undated) DEVICE — SUCTION INSTRUMENT YANKAUER BULBOUS TIP W/O VENT (50EA/CA)

## (undated) DEVICE — CONNECTOR HOSE NEPTUNE FOR CYSTO ROOM

## (undated) DEVICE — LACTATED RINGERS INJ 1000 ML - (14EA/CA 60CA/PF)

## (undated) DEVICE — COVER LIGHT HANDLE ALC PLUS DISP (18EA/BX)

## (undated) DEVICE — SET EXTENSION WITH 2 PORTS (48EA/CA) ***PART #2C8610 IS A SUBSTITUTE*****

## (undated) DEVICE — WATER IRRIGATION STERILE 1000ML (12EA/CA)

## (undated) DEVICE — GOWN SURGEONS X-LARGE - DISP. (30/CA)

## (undated) DEVICE — BAG DRAINAGE LINGEMAN CYSTO FOR GE/OEC 2600/2800 TABLES (20EA/CA)

## (undated) DEVICE — SLEEVE, VASO, THIGH, MED

## (undated) DEVICE — SODIUM CHL. IRRIGATION 0.9% 3000ML (4EA/CA 65CA/PF)

## (undated) DEVICE — COVER FOOT UNIVERSAL DISP. - (25EA/CA)

## (undated) DEVICE — WATER IRRIG. STER 3000 ML - (4/CA)

## (undated) DEVICE — GLOVE BIOGEL SZ 7.5 SURGICAL PF LTX - (50PR/BX 4BX/CA)

## (undated) DEVICE — JELLY SURGILUBE STERILE TUBE 4.25 OZ (1/EA)

## (undated) DEVICE — SET IRRIGATION CYSTOSCOPY Y-TYPE L81 IN (20EA/CA)

## (undated) DEVICE — CANISTER SUCTION 3000ML MECHANICAL FILTER AUTO SHUTOFF MEDI-VAC NONSTERILE LF DISP (40EA/CA)

## (undated) DEVICE — SET LEADWIRE 5 LEAD BEDSIDE DISPOSABLE ECG (1SET OF 5/EA)